# Patient Record
Sex: FEMALE | Race: BLACK OR AFRICAN AMERICAN | NOT HISPANIC OR LATINO | Employment: UNEMPLOYED | URBAN - METROPOLITAN AREA
[De-identification: names, ages, dates, MRNs, and addresses within clinical notes are randomized per-mention and may not be internally consistent; named-entity substitution may affect disease eponyms.]

---

## 2017-02-16 DIAGNOSIS — K21.9 GASTROESOPHAGEAL REFLUX DISEASE, ESOPHAGITIS PRESENCE NOT SPECIFIED: ICD-10-CM

## 2017-02-16 RX ORDER — OMEPRAZOLE 20 MG/1
CAPSULE, DELAYED RELEASE ORAL
Qty: 30 CAPSULE | Refills: 0 | Status: CANCELLED | OUTPATIENT
Start: 2017-02-16

## 2017-02-16 NOTE — TELEPHONE ENCOUNTER
----- Message from Janae Keyes sent at 2/16/2017 11:34 AM CST -----  Contact: Self  REFILL: omeprazole (PRILOSEC) 20 MG capsule

## 2017-02-17 RX ORDER — OMEPRAZOLE 20 MG/1
20 CAPSULE, DELAYED RELEASE ORAL DAILY
Qty: 30 CAPSULE | Refills: 11 | Status: SHIPPED | OUTPATIENT
Start: 2017-02-17 | End: 2018-02-19 | Stop reason: SDUPTHER

## 2017-03-10 ENCOUNTER — OFFICE VISIT (OUTPATIENT)
Dept: FAMILY MEDICINE | Facility: CLINIC | Age: 57
End: 2017-03-10
Payer: COMMERCIAL

## 2017-03-10 VITALS
HEART RATE: 135 BPM | SYSTOLIC BLOOD PRESSURE: 152 MMHG | WEIGHT: 160.25 LBS | HEIGHT: 66 IN | TEMPERATURE: 100 F | DIASTOLIC BLOOD PRESSURE: 78 MMHG | BODY MASS INDEX: 25.75 KG/M2 | OXYGEN SATURATION: 98 %

## 2017-03-10 DIAGNOSIS — B96.89 BACTERIAL SINUSITIS: ICD-10-CM

## 2017-03-10 DIAGNOSIS — I10 ESSENTIAL HYPERTENSION: Chronic | ICD-10-CM

## 2017-03-10 DIAGNOSIS — J06.9 VIRAL URI WITH COUGH: Primary | ICD-10-CM

## 2017-03-10 DIAGNOSIS — J32.9 BACTERIAL SINUSITIS: ICD-10-CM

## 2017-03-10 PROCEDURE — 1160F RVW MEDS BY RX/DR IN RCRD: CPT | Mod: S$GLB,,, | Performed by: NURSE PRACTITIONER

## 2017-03-10 PROCEDURE — 3078F DIAST BP <80 MM HG: CPT | Mod: S$GLB,,, | Performed by: NURSE PRACTITIONER

## 2017-03-10 PROCEDURE — 99999 PR PBB SHADOW E&M-EST. PATIENT-LVL III: CPT | Mod: PBBFAC,,, | Performed by: NURSE PRACTITIONER

## 2017-03-10 PROCEDURE — 99214 OFFICE O/P EST MOD 30 MIN: CPT | Mod: S$GLB,,, | Performed by: NURSE PRACTITIONER

## 2017-03-10 PROCEDURE — 3077F SYST BP >= 140 MM HG: CPT | Mod: S$GLB,,, | Performed by: NURSE PRACTITIONER

## 2017-03-10 RX ORDER — DOXYCYCLINE 100 MG/1
100 CAPSULE ORAL 2 TIMES DAILY
Qty: 20 CAPSULE | Refills: 0 | Status: SHIPPED | OUTPATIENT
Start: 2017-03-10 | End: 2017-11-15

## 2017-03-10 RX ORDER — CODEINE PHOSPHATE AND GUAIFENESIN 10; 100 MG/5ML; MG/5ML
5 SOLUTION ORAL EVERY 4 HOURS PRN
Qty: 120 ML | Refills: 0 | Status: SHIPPED | OUTPATIENT
Start: 2017-03-10 | End: 2017-11-15

## 2017-03-10 RX ORDER — METHYLPREDNISOLONE 4 MG/1
TABLET ORAL
Qty: 1 PACKAGE | Refills: 0 | Status: SHIPPED | OUTPATIENT
Start: 2017-03-10 | End: 2017-10-17

## 2017-03-10 NOTE — PROGRESS NOTES
This dictation has been generated using Dragon Dictation some phonetic errors may occur.     Linh was seen today for sinus problem and cough.    Diagnoses and all orders for this visit:    Viral URI with cough    Bacterial sinusitis  -     doxycycline (MONODOX) 100 MG capsule; Take 1 capsule (100 mg total) by mouth 2 (two) times daily.    Essential hypertension    Other orders  -     methylPREDNISolone (MEDROL DOSEPACK) 4 mg tablet; use as directed  -     guaifenesin-codeine 100-10 mg/5 ml (CHERATUSSIN AC)  mg/5 mL syrup; Take 5 mLs by mouth every 4 (four) hours as needed for Cough. Warning:  Risk of somnolence.      Viral upper respiratory infection with likely superimposing bacterial sinusitis treated with meds as above.      Return in about 2 weeks (around 3/24/2017).      ________________________________________________________________  ________________________________________________________________        Chief Complaint   Patient presents with    Sinus Problem    Cough     History of present illness  This 56 y.o. presents today for complaint of sinus issues.  Symptoms started about 10 days ago with sneezing runny nose and some postnasal drip.  Since that time she's got more sinus pressure and occasional right ear pain.  She had a sinus infection in December which responded slowly to antibiotics but did resolve.  She is currently taking some Zyrtec.  Patient notes sinus pain.   Overall symptoms have dramatically worsened.  Review of systems  No fever or chills  No sore throat.  No drainage from the eyes.  Patient denies gland swelling  She denies chest pain or shortness of breath.  Occasional cough nonproductive.  No nausea vomiting or diarrhea    Past medical and social history reviewed.  Nonsmoker    Past Medical History:   Diagnosis Date    Anemia 4/16/2015    Coronary artery disease     GERD (gastroesophageal reflux disease)     Hyperlipidemia     Hypertension     Microcytic anemia 4/16/2015     Positive PPD 4/16/2015    Vitamin D deficiency disease 4/16/2015       Past Surgical History:   Procedure Laterality Date    BREAST CYST EXCISION      PARTIAL HYSTERECTOMY      over 21 years ago       Family History   Problem Relation Age of Onset    Heart disease Father     Hyperlipidemia Father     Hyperlipidemia Mother     Kidney disease Mother     Cancer Brother      pancreas    Heart disease Brother     Breast cancer Neg Hx     Colon cancer Neg Hx     Ovarian cancer Neg Hx        Social History     Social History    Marital status:      Spouse name: N/A    Number of children: N/A    Years of education: N/A     Occupational History    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 30 tablet 11    aspirin 81 MG Chew Take 81 mg by mouth once daily.        atorvastatin (LIPITOR) 20 MG tablet Take 1 tablet by mouth daily 90 tablet 3    ergocalciferol (ERGOCALCIFEROL) 50,000 unit Cap Take 1 capsule (50,000 Units total) by mouth every 7 days. 12 capsule 12    hydrochlorothiazide (HYDRODIURIL) 25 MG tablet Take 1 tablet (25 mg total) by mouth once daily. 90 tablet 12    omeprazole (PRILOSEC) 20 MG capsule Take 1 capsule (20 mg total) by mouth once daily. 30 capsule 11    doxycycline (MONODOX) 100 MG capsule Take 1 capsule (100 mg total) by mouth 2 (two) times daily. 20 capsule 0    guaifenesin-codeine 100-10 mg/5 ml (CHERATUSSIN AC)  mg/5 mL syrup Take 5 mLs by mouth every 4 (four) hours as needed for Cough. Warning:   Risk of somnolence. 120 mL 0    methylPREDNISolone (MEDROL DOSEPACK) 4 mg tablet use as directed 1 Package 0    metoprolol tartrate (LOPRESSOR) 25 MG tablet Take 1 tablet (25 mg total) by mouth 2 (two) times daily as needed (palpitations). 60 tablet 11    zolpidem (AMBIEN) 10 mg Tab Take 1 tablet (10 mg total) by mouth nightly as needed. 30 tablet 5     No current facility-administered medications for this visit.        Review of patient's allergies indicates:  No Known Allergies    Physical examination  Vitals Reviewed.  Temperature is noted 100.4.  Gen. Well-dressed well-nourished patient appears ill not septic  Skin warm dry and intact.  No rashes noted.  HEENT.  TM intact bilateral with normal light reflex.  No mastoid tenderness during percussion.  Nares patent bilateral.  Pharynx is unremarkable.  Maxillary or frontal sinus tenderness when percussed.    Neck is supple without adenopathy  Chest.  Respirations are even unlabored.  Lungs are clear to auscultation.  Cardiac regular rate and rhythm.  No chest wall adenopathy noted.  Neuro. Awake alert oriented x4.  Normal judgment and cognition noted.  Extremities no clubbing cyanosis or edema noted.     Call or return to clinic prn if these symptoms worsen or fail to improve as anticipated.

## 2017-03-10 NOTE — MR AVS SNAPSHOT
[facility]  [address]  [place] [address]  Phone: [phone]  Fax: [phone]                  Linh Clifton   3/10/2017 7:20 AM   Office Visit    Description:  Female : 1960   Provider:  Roge Lainez NP   Department:  [facility]           Reason for Visit     Sinus Problem     Cough           Diagnoses this Visit        Comments    Viral URI with cough    -  Primary     Bacterial sinusitis         Essential hypertension                To Do List           Goals (5 Years of Data)     None      Follow-Up and Disposition     Return in about 2 weeks (around 3/24/2017).       These Medications        Disp Refills Start End    doxycycline (MONODOX) 100 MG capsule 20 capsule 0 3/10/2017     Take 1 capsule (100 mg total) by mouth 2 (two) times daily. - Oral    Pharmacy: [id number] - [place], [place] - [address] Ph #: [phone]       methylPREDNISolone (MEDROL DOSEPACK) 4 mg tablet 1 Package 0 3/10/2017     use as directed    Pharmacy: [facility] - [place], [place] - [address] Ph #: [phone]       guaifenesin-codeine 100-10 mg/5 ml (CHERATUSSIN AC)  mg/5 mL syrup 120 mL 0 3/10/2017     Take 5 mLs by mouth every 4 (four) hours as needed for Cough. Warning:  Risk of somnolence. - Oral    Pharmacy: [facility] - [place], [place] - [address] Ph #: [phone]         [facility] On Call     [facility] On Call Nurse Care Line -  Assistance  Registered nurses in the [facility] provide clinical advisement, health education, appointment booking, and other advisory services.  Call for this free service at [phone].             Medications           Message regarding Medications     Verify the changes and/or additions to your medication regime listed below are the same as discussed with your clinician today.  If any of these changes or additions are incorrect, please notify your  healthcare provider.        START taking these NEW medications        Refills    doxycycline (MONODOX) 100 MG capsule 0    Sig: Take 1 capsule (100 mg total) by mouth 2 (two) times daily.    Class: Normal    Route: Oral    methylPREDNISolone (MEDROL DOSEPACK) 4 mg tablet 0    Sig: use as directed    Class: Normal    guaifenesin-codeine 100-10 mg/5 ml (CHERATUSSIN AC)  mg/5 mL syrup 0    Sig: Take 5 mLs by mouth every 4 (four) hours as needed for Cough. Warning:  Risk of somnolence.    Class: Normal    Route: Oral      STOP taking these medications     doxycycline (VIBRA-TABS) 100 MG tablet Take 1 tablet (100 mg total) by mouth 2 (two) times daily.    hydrocodone-acetaminophen 5-325mg (NORCO) 5-325 mg per tablet Take 1 tablet by mouth every 6 (six) hours as needed for Pain.    naproxen (NAPROSYN) 500 MG tablet Take 1 tablet (500 mg total) by mouth 2 (two) times daily.           Verify that the below list of medications is an accurate representation of the medications you are currently taking.  If none reported, the list may be blank. If incorrect, please contact your healthcare provider. Carry this list with you in case of emergency.           Current Medications     acyclovir (ZOVIRAX) 400 MG tablet Take 1 tablet (400 mg total) by mouth 3 (three) times daily as needed.    aspirin 81 MG Chew Take 81 mg by mouth once daily.      atorvastatin (LIPITOR) 20 MG tablet Take 1 tablet by mouth daily    ergocalciferol (ERGOCALCIFEROL) 50,000 unit Cap Take 1 capsule (50,000 Units total) by mouth every 7 days.    hydrochlorothiazide (HYDRODIURIL) 25 MG tablet Take 1 tablet (25 mg total) by mouth once daily.    omeprazole (PRILOSEC) 20 MG capsule Take 1 capsule (20 mg total) by mouth once daily.    doxycycline (MONODOX) 100 MG capsule Take 1 capsule (100 mg total) by mouth 2 (two) times daily.    guaifenesin-codeine 100-10 mg/5 ml (CHERATUSSIN AC)  mg/5 mL syrup Take 5 mLs by mouth every 4 (four) hours as needed for  "Cough. Warning:  Risk of somnolence.    methylPREDNISolone (MEDROL DOSEPACK) 4 mg tablet use as directed    metoprolol tartrate (LOPRESSOR) 25 MG tablet Take 1 tablet (25 mg total) by mouth 2 (two) times daily as needed (palpitations).    zolpidem (AMBIEN) 10 mg Tab Take 1 tablet (10 mg total) by mouth nightly as needed.           Clinical Reference Information           Your Vitals Were     BP Pulse Temp Height Weight SpO2    152/78 (BP Location: Right arm, Patient Position: Sitting, BP Method: Manual) 135 100.4 °F (38 °C) 5' 6" (1.676 m) 72.7 kg (160 lb 4.4 oz) 98%    BMI                25.87 kg/m2          Blood Pressure          Most Recent Value    BP  (!)  152/78      Allergies as of 3/10/2017     No Known Allergies      Immunizations Administered on Date of Encounter - 3/10/2017     None      Language Assistance Services     ATTENTION: Language assistance services are available, free of charge. Please call [phone].      ATENCIÓN: Si rahat susan, tiene a draper disposición servicios gratuitos de asistencia lingüística. Llame al [phone].     ALMA Ý: N?u b?n nói Ti?ng Vi?t, có các d?ch v? h? tr? ngôn ng? mi?n phí dennysh cho b?n. G?i s? [phone].         [facility] complies with applicable Federal civil rights laws and does not discriminate on the basis of race, color, national origin, age, disability, or sex.        "

## 2017-06-26 RX ORDER — HYDROCHLOROTHIAZIDE 25 MG/1
25 TABLET ORAL DAILY
Qty: 90 TABLET | Refills: 12 | Status: SHIPPED | OUTPATIENT
Start: 2017-06-26 | End: 2018-07-13 | Stop reason: SDUPTHER

## 2017-06-26 RX ORDER — ERGOCALCIFEROL 1.25 MG/1
50000 CAPSULE ORAL
Qty: 12 CAPSULE | Refills: 12 | Status: SHIPPED | OUTPATIENT
Start: 2017-06-26 | End: 2018-07-05 | Stop reason: SDUPTHER

## 2017-06-26 RX ORDER — ATORVASTATIN CALCIUM 20 MG/1
TABLET, FILM COATED ORAL
Qty: 90 TABLET | Refills: 3 | Status: SHIPPED | OUTPATIENT
Start: 2017-06-26 | End: 2018-07-13 | Stop reason: SDUPTHER

## 2017-08-18 ENCOUNTER — PATIENT MESSAGE (OUTPATIENT)
Dept: RESEARCH | Facility: HOSPITAL | Age: 57
End: 2017-08-18

## 2017-10-04 ENCOUNTER — OFFICE VISIT (OUTPATIENT)
Dept: FAMILY MEDICINE | Facility: CLINIC | Age: 57
End: 2017-10-04
Payer: COMMERCIAL

## 2017-10-04 VITALS
DIASTOLIC BLOOD PRESSURE: 60 MMHG | BODY MASS INDEX: 30.02 KG/M2 | WEIGHT: 163.13 LBS | TEMPERATURE: 98 F | HEIGHT: 62 IN | OXYGEN SATURATION: 97 % | HEART RATE: 99 BPM | SYSTOLIC BLOOD PRESSURE: 128 MMHG

## 2017-10-04 DIAGNOSIS — J30.2 ACUTE SEASONAL ALLERGIC RHINITIS, UNSPECIFIED TRIGGER: ICD-10-CM

## 2017-10-04 DIAGNOSIS — J06.9 URI, ACUTE: ICD-10-CM

## 2017-10-04 DIAGNOSIS — M70.22 OLECRANON BURSITIS OF LEFT ELBOW: Primary | ICD-10-CM

## 2017-10-04 PROCEDURE — 99999 PR PBB SHADOW E&M-EST. PATIENT-LVL III: CPT | Mod: PBBFAC,,, | Performed by: INTERNAL MEDICINE

## 2017-10-04 PROCEDURE — 99214 OFFICE O/P EST MOD 30 MIN: CPT | Mod: S$GLB,,, | Performed by: INTERNAL MEDICINE

## 2017-10-04 NOTE — PROGRESS NOTES
Chief complaint: Elbow pain    56-year-old black female   checking and 16 minutes late for the prior appointment in checking in 45 minutes early for this appointment and then having attitude with the staff checking her in..  His reports that about one week ago she hit her left elbow on a door.  Some immediate pain.  No real pain now but she does have some tenderness over the olecranon as well as some swelling that she was concerned about.  No reduced range of motion.  She has not taken any medication for this.  Also this morning she awoke with a scratchy throat and postnasal drip.  She does have ALLERGIES.  She takes Zyrtec and it makes her sleepy.  We discussed switching to Allegra.  She can add Chlor-Trimeton at 8 in case she gets the drip at night.  She has no peptic ulcer disease or renal insufficiency that would preclude her from using anti-inflammatories such as Naprosyn for the inflammation of the elbow      ROS:   CONST: No fevers or chills, no facial pain, no malaise to suggest a virus, no other myalgias arthralgias, no neurological symptoms or numbness in the hand      Past medical history:  1.  Hypertension  2.  Hyperlipidemia  3.  Heart disease, seen by Dr. Tirado in the past and cardiac angiogram over 10 years ago showed only a 40% blockage- SARAH neg   4.  Normal colonoscopy at age 48, 10 years  5.  Mammograms done outside the system  6.  ALLERGIC rhinitis  7.  Vitamin D deficiency  8.  Positive PPD with normal chest x-rays  9.  Microcytic anemia    Social history:  with 2 children, occasional wine, never smoked, works as a rehabilitation technician    Family history: Mom  of kidney failure, father  of MI in his 70s, one brother  of pancreatic cancer and one brother being treated for lung cancer.  Both brothers were smokers.    Vital signs as above, Gen: no distress  EYES: conjunctiva clear, non-icteric, PERRL  ENT: nose congested, nasal mucosa red, oropharynx slightly red and  "moist, teeth good  NECK:supple, thyroid non-palpable, no cervical lymph nodes  RESP: effort is good, lungs clear  CV: heart RRR w/o murmur, gallops or rubs; , no edema  GI: abdomen soft, non-distended, non-tender  MS: gait normal, no clubbing or cyanosis of the digits, left elbow has full range of motion without pain, forearm nontender.  She does have some mild swelling and slight tenderness of the left olecranon bursa compared to the right.  No breaks in the skin.  SKIN: no rashes, warm to touch, no sinus pain to touch    Linh was seen today for elbow injury.    Diagnoses and all orders for this visit:    Olecranon bursitis of left elbow, secondary to trauma, reassured, she needs to apply an elbow brace for compression and protection and use the Aleve twice a day for a couple of days    Acute seasonal allergic rhinitis, unspecified trigger, symptoms appear acute without other malaise do not suspect a virus but she could well have a URI which we discussed the progression of symptoms.  Either way she will treat the symptoms with antihistamine such as Allegra, Chlor-Trimeton etc.    URI, acute        Clinical note will be sensitive aced upon the need to document the significantly late arrivals"This note will not be shared with the patient."  "

## 2017-10-16 ENCOUNTER — TELEPHONE (OUTPATIENT)
Dept: FAMILY MEDICINE | Facility: CLINIC | Age: 57
End: 2017-10-16

## 2017-10-16 NOTE — TELEPHONE ENCOUNTER
----- Message from Arminda Urena sent at 10/16/2017  3:27 PM CDT -----  Contact: Self   Patient says she was prescribed all over the counter medications but none of them are working for her elbow pain. Please call at [phone]

## 2017-10-17 ENCOUNTER — OFFICE VISIT (OUTPATIENT)
Dept: FAMILY MEDICINE | Facility: CLINIC | Age: 57
End: 2017-10-17
Payer: COMMERCIAL

## 2017-10-17 VITALS
OXYGEN SATURATION: 97 % | SYSTOLIC BLOOD PRESSURE: 140 MMHG | BODY MASS INDEX: 29.9 KG/M2 | WEIGHT: 162.5 LBS | TEMPERATURE: 98 F | HEIGHT: 62 IN | HEART RATE: 100 BPM | DIASTOLIC BLOOD PRESSURE: 80 MMHG

## 2017-10-17 DIAGNOSIS — Z23 NEED FOR TD VACCINE: Primary | ICD-10-CM

## 2017-10-17 DIAGNOSIS — Z23 NEED FOR TDAP VACCINATION: ICD-10-CM

## 2017-10-17 DIAGNOSIS — Z23 NEED FOR INFLUENZA VACCINATION: ICD-10-CM

## 2017-10-17 PROCEDURE — 90714 TD VACC NO PRESV 7 YRS+ IM: CPT | Mod: S$GLB,,, | Performed by: NURSE PRACTITIONER

## 2017-10-17 PROCEDURE — 90471 IMMUNIZATION ADMIN: CPT | Mod: S$GLB,,, | Performed by: NURSE PRACTITIONER

## 2017-10-17 PROCEDURE — 90472 IMMUNIZATION ADMIN EACH ADD: CPT | Mod: S$GLB,,, | Performed by: NURSE PRACTITIONER

## 2017-10-17 PROCEDURE — 99999 PR PBB SHADOW E&M-EST. PATIENT-LVL IV: CPT | Mod: PBBFAC,,, | Performed by: NURSE PRACTITIONER

## 2017-10-17 PROCEDURE — 90686 IIV4 VACC NO PRSV 0.5 ML IM: CPT | Mod: S$GLB,,, | Performed by: NURSE PRACTITIONER

## 2017-10-17 PROCEDURE — 99499 UNLISTED E&M SERVICE: CPT | Mod: S$GLB,,, | Performed by: NURSE PRACTITIONER

## 2017-10-17 RX ORDER — DICLOFENAC SODIUM 75 MG/1
75 TABLET, DELAYED RELEASE ORAL 2 TIMES DAILY
Qty: 60 TABLET | Refills: 0 | Status: SHIPPED | OUTPATIENT
Start: 2017-10-17 | End: 2017-11-15

## 2017-10-17 NOTE — PROGRESS NOTES
Subjective:       Patient ID: Linh Clifton is a 57 y.o. female.    Chief Complaint: No chief complaint on file.    HPI    Past Medical History:   Diagnosis Date    Anemia 4/16/2015    Coronary artery disease     GERD (gastroesophageal reflux disease)     Hyperlipidemia     Hypertension     Microcytic anemia 4/16/2015    Positive PPD 4/16/2015    Vitamin D deficiency disease 4/16/2015       Social History     Social History    Marital status:      Spouse name: N/A    Number of children: N/A    Years of education: N/A     Occupational History    PA asst [employer]     Social History Main Topics    Smoking status: Never Smoker    Smokeless tobacco: Never Used    Alcohol use 0.6 oz/week     1 Glasses of wine per week      Comment: occasional drinking    Drug use: No    Sexual activity: Not on file     Other Topics Concern    Not on file     Social History Narrative    No narrative on file       Past Surgical History:   Procedure Laterality Date    BREAST CYST EXCISION      PARTIAL HYSTERECTOMY      over 21 years ago       Review of Systems       Objective:   There were no vitals taken for this visit.     Physical Exam       Assessment:       No diagnosis found.    Plan:       There are no diagnoses linked to this encounter.

## 2017-11-15 ENCOUNTER — OFFICE VISIT (OUTPATIENT)
Dept: FAMILY MEDICINE | Facility: CLINIC | Age: 57
End: 2017-11-15
Payer: COMMERCIAL

## 2017-11-15 VITALS
SYSTOLIC BLOOD PRESSURE: 146 MMHG | BODY MASS INDEX: 29.86 KG/M2 | TEMPERATURE: 98 F | HEIGHT: 62 IN | WEIGHT: 162.25 LBS | DIASTOLIC BLOOD PRESSURE: 80 MMHG | HEART RATE: 100 BPM | OXYGEN SATURATION: 98 %

## 2017-11-15 DIAGNOSIS — J01.00 ACUTE NON-RECURRENT MAXILLARY SINUSITIS: Primary | ICD-10-CM

## 2017-11-15 PROCEDURE — 99214 OFFICE O/P EST MOD 30 MIN: CPT | Mod: S$GLB,,, | Performed by: NURSE PRACTITIONER

## 2017-11-15 PROCEDURE — 99999 PR PBB SHADOW E&M-EST. PATIENT-LVL IV: CPT | Mod: PBBFAC,,, | Performed by: NURSE PRACTITIONER

## 2017-11-15 RX ORDER — PROMETHAZINE HYDROCHLORIDE AND DEXTROMETHORPHAN HYDROBROMIDE 6.25; 15 MG/5ML; MG/5ML
5 SYRUP ORAL
Qty: 180 ML | Refills: 0 | Status: SHIPPED | OUTPATIENT
Start: 2017-11-15 | End: 2017-11-25

## 2017-11-15 RX ORDER — FLUTICASONE PROPIONATE 50 MCG
2 SPRAY, SUSPENSION (ML) NASAL DAILY
Qty: 16 G | Refills: 0 | Status: SHIPPED | OUTPATIENT
Start: 2017-11-15 | End: 2023-07-25

## 2017-11-15 RX ORDER — LEVOCETIRIZINE DIHYDROCHLORIDE 5 MG/1
5 TABLET, FILM COATED ORAL NIGHTLY
Qty: 30 TABLET | Refills: 0 | Status: SHIPPED | OUTPATIENT
Start: 2017-11-15 | End: 2019-06-11 | Stop reason: ALTCHOICE

## 2017-11-15 NOTE — PROGRESS NOTES
Subjective:       Patient ID: Linh Clifton is a 57 y.o. female.    Chief Complaint: Cough and Nasal Congestion    Sinusitis   This is a new problem. Episode onset: 4 days  The problem has been gradually improving since onset. Her pain is at a severity of 4/10. Associated symptoms include chills, congestion, coughing, diaphoresis, headaches, shortness of breath, sinus pressure and a sore throat. Pertinent negatives include no ear pain or sneezing. Treatments tried: nyquil. The treatment provided moderate relief.       Past Medical History:   Diagnosis Date    Anemia 4/16/2015    Coronary artery disease     GERD (gastroesophageal reflux disease)     Hyperlipidemia     Hypertension     Microcytic anemia 4/16/2015    Positive PPD 4/16/2015    Vitamin D deficiency disease 4/16/2015       Social History     Social History    Marital status:      Spouse name: N/A    Number of children: N/A    Years of education: N/A     Occupational History    PA  [employer]     Social History Main Topics    Smoking status: Never Smoker    Smokeless tobacco: Never Used    Alcohol use 0.6 oz/week     1 Glasses of wine per week      Comment: occasional drinking    Drug use: No    Sexual activity: Not on file     Other Topics Concern    Not on file     Social History Narrative    No narrative on file       Past Surgical History:   Procedure Laterality Date    BREAST CYST EXCISION      PARTIAL HYSTERECTOMY      over 21 years ago       Review of Systems   Constitutional: Positive for chills, diaphoresis and fever.   HENT: Positive for congestion, postnasal drip, rhinorrhea, sinus pressure and sore throat. Negative for ear pain, sneezing and trouble swallowing.    Respiratory: Positive for cough, chest tightness and shortness of breath.    Cardiovascular: Negative for chest pain and palpitations.   Musculoskeletal: Positive for myalgias.   Neurological: Positive for headaches.   All other  "systems reviewed and are negative.      Objective:   BP (!) 146/80 (BP Location: Right arm, Patient Position: Sitting, BP Method: Small (Manual))   Pulse 100   Temp 98.1 °F (36.7 °C) (Oral)   Ht 5' 2" (1.575 m)   Wt 73.6 kg (162 lb 4.1 oz)   SpO2 98%   BMI 29.68 kg/m²      Physical Exam   Constitutional: She is oriented to person, place, and time. She appears well-developed and well-nourished. She is cooperative.   HENT:   Head: Normocephalic and atraumatic.   Right Ear: Hearing, external ear and ear canal normal. A middle ear effusion is present.   Left Ear: Hearing, external ear and ear canal normal. Tympanic membrane is bulging.   Nose: Mucosal edema and rhinorrhea present. Right sinus exhibits maxillary sinus tenderness and frontal sinus tenderness. Left sinus exhibits maxillary sinus tenderness and frontal sinus tenderness.   Mouth/Throat: Posterior oropharyngeal erythema present. No oropharyngeal exudate or posterior oropharyngeal edema.   Cardiovascular: Normal rate, regular rhythm, S1 normal, S2 normal and normal heart sounds.    Pulmonary/Chest: Effort normal and breath sounds normal. No respiratory distress. She has no decreased breath sounds. She has no wheezes. She has no rhonchi. She has no rales.   Lymphadenopathy:     She has no cervical adenopathy.   Neurological: She is alert and oriented to person, place, and time.   Skin: Skin is warm, dry and intact. She is not diaphoretic. No pallor.   Psychiatric: She has a normal mood and affect. Her speech is normal and behavior is normal.   Vitals reviewed.      Assessment:       1. Acute non-recurrent maxillary sinusitis        Plan:       Linh was seen today for cough and nasal congestion.    Diagnoses and all orders for this visit:    Acute non-recurrent maxillary sinusitis  -     levocetirizine (XYZAL) 5 MG tablet; Take 1 tablet (5 mg total) by mouth every evening.  -     fluticasone (FLONASE) 50 mcg/actuation nasal spray; 2 sprays by Each Nare " route once daily.  -     promethazine-dextromethorphan (PROMETHAZINE-DM) 6.25-15 mg/5 mL Syrp; Take 5 mLs by mouth every 4 to 6 hours as needed.  -     Discussed diagnosis and treatment of URI.   -     Suggested symptomatic OTC remedies.   -     Increase your water intake to 64-80 oz daily to help thin mucus  -     Nasal Saline spray (Over the counter [place] spray or [place])  2 sprays each nostril 2-3 times a day for nasal congestion  -     Tylenol 500 mg 2 tablets or Ibuprofen 200 mg 2 tablets every 4-6 hours as needed for fever, headaches, sore throat, ear pain, bodyaches, and/or nasal/sinus inflammation  -     Warm salt water gargles with 1/2 cup water and 1 tablespoon salt every 4 hours  -     Warm tea with honey and lemon      Return if symptoms worsen or fail to improve.

## 2017-11-15 NOTE — PATIENT INSTRUCTIONS

## 2018-02-19 DIAGNOSIS — K21.9 GASTROESOPHAGEAL REFLUX DISEASE, ESOPHAGITIS PRESENCE NOT SPECIFIED: ICD-10-CM

## 2018-02-19 RX ORDER — OMEPRAZOLE 20 MG/1
CAPSULE, DELAYED RELEASE ORAL
Qty: 30 CAPSULE | Refills: 11 | Status: SHIPPED | OUTPATIENT
Start: 2018-02-19 | End: 2019-02-22 | Stop reason: SDUPTHER

## 2018-02-22 ENCOUNTER — TELEPHONE (OUTPATIENT)
Dept: FAMILY MEDICINE | Facility: CLINIC | Age: 58
End: 2018-02-22

## 2018-02-22 NOTE — TELEPHONE ENCOUNTER
Verified with pharmacy that prilosec has been at the pharmacy x4 days. Patient did not call and verify with Pharmacy. Patient informed that medication was at the pharmacy and ready for .  No further questions or concerns.

## 2018-02-22 NOTE — TELEPHONE ENCOUNTER
----- Message from Marlen Augustin sent at 2/22/2018 11:53 AM CST -----  Contact: self  Rep states [facility] does not have script for  ---omeprazole (PRILOSEC) 20 MG capsule--- Pt call back # [phone]

## 2018-05-09 ENCOUNTER — TELEPHONE (OUTPATIENT)
Dept: FAMILY MEDICINE | Facility: CLINIC | Age: 58
End: 2018-05-09

## 2018-05-09 DIAGNOSIS — Z12.12 SCREENING FOR COLORECTAL CANCER: Primary | ICD-10-CM

## 2018-05-09 DIAGNOSIS — Z12.11 SCREENING FOR COLORECTAL CANCER: Primary | ICD-10-CM

## 2018-05-09 NOTE — TELEPHONE ENCOUNTER
----- Message from Feliciano Chandler MD sent at 5/9/2018  3:23 PM CDT -----  I am getting too many repeated messages forwarded to me such as this that appear that patient should have been called to get more information prior to passing  it to me.  I have copied numerous of these messages and have them for you if you would like.  I also get [facility] on call messages stating disposition is to call patient back and they are simply forwarded to me without calling the patient and getting more current information.  This is really piling up and slowing down messages being completed.  Please address this with the nurses, particular Richelle.  She is instructing the new nurse who seems to be following her lead on this

## 2018-05-09 NOTE — TELEPHONE ENCOUNTER
----- Message from Carie Torres sent at 5/8/2018  3:25 PM CDT -----  Contact: self   [phone]  Pt is requesting to speak to  you regarding a colonoscopy  Appt.Pls call pt [phone]. Thanks......Verna

## 2018-05-09 NOTE — TELEPHONE ENCOUNTER
Please always call patient to get more information prior to forwarding messages.  I cannot really do anything with this message

## 2018-05-13 NOTE — TELEPHONE ENCOUNTER
Advise order in and endoscopy staff at [facility] should be calling at some point

## 2018-06-14 DIAGNOSIS — Z11.59 NEED FOR HEPATITIS C SCREENING TEST: ICD-10-CM

## 2018-06-15 ENCOUNTER — HOSPITAL ENCOUNTER (OUTPATIENT)
Facility: HOSPITAL | Age: 58
Discharge: HOME | End: 2018-06-15
Attending: INTERNAL MEDICINE | Admitting: INTERNAL MEDICINE
Payer: COMMERCIAL

## 2018-06-15 ENCOUNTER — ANESTHESIA EVENT (OUTPATIENT)
Dept: ENDOSCOPY | Facility: HOSPITAL | Age: 58
End: 2018-06-15
Payer: COMMERCIAL

## 2018-06-15 ENCOUNTER — ANESTHESIA (OUTPATIENT)
Dept: ENDOSCOPY | Facility: HOSPITAL | Age: 58
End: 2018-06-15
Payer: COMMERCIAL

## 2018-06-15 VITALS
OXYGEN SATURATION: 100 % | SYSTOLIC BLOOD PRESSURE: 146 MMHG | DIASTOLIC BLOOD PRESSURE: 71 MMHG | WEIGHT: 162 LBS | TEMPERATURE: 98 F | RESPIRATION RATE: 18 BRPM | HEIGHT: 66 IN | BODY MASS INDEX: 26.03 KG/M2 | HEART RATE: 62 BPM

## 2018-06-15 DIAGNOSIS — Z12.11 ENCOUNTER FOR SCREENING COLONOSCOPY: ICD-10-CM

## 2018-06-15 PROCEDURE — 88305 TISSUE EXAM BY PATHOLOGIST: CPT | Performed by: PATHOLOGY

## 2018-06-15 PROCEDURE — 37000008 HC ANESTHESIA 1ST 15 MINUTES: Performed by: INTERNAL MEDICINE

## 2018-06-15 PROCEDURE — 25000003 PHARM REV CODE 250: Performed by: INTERNAL MEDICINE

## 2018-06-15 PROCEDURE — D9220A PRA ANESTHESIA: Mod: 33,ANES,, | Performed by: ANESTHESIOLOGY

## 2018-06-15 PROCEDURE — 88305 TISSUE EXAM BY PATHOLOGIST: CPT | Mod: 26,,, | Performed by: PATHOLOGY

## 2018-06-15 PROCEDURE — 37000009 HC ANESTHESIA EA ADD 15 MINS: Performed by: INTERNAL MEDICINE

## 2018-06-15 PROCEDURE — 63600175 PHARM REV CODE 636 W HCPCS: Performed by: NURSE ANESTHETIST, CERTIFIED REGISTERED

## 2018-06-15 PROCEDURE — D9220A PRA ANESTHESIA: Mod: 33,CRNA,, | Performed by: NURSE ANESTHETIST, CERTIFIED REGISTERED

## 2018-06-15 PROCEDURE — 27201089 HC SNARE, DISP (ANY): Performed by: INTERNAL MEDICINE

## 2018-06-15 PROCEDURE — 45385 COLONOSCOPY W/LESION REMOVAL: CPT | Performed by: INTERNAL MEDICINE

## 2018-06-15 RX ORDER — SODIUM CHLORIDE 9 MG/ML
INJECTION, SOLUTION INTRAVENOUS CONTINUOUS
Status: DISCONTINUED | OUTPATIENT
Start: 2018-06-15 | End: 2018-06-15 | Stop reason: HOSPADM

## 2018-06-15 RX ORDER — PROPOFOL 10 MG/ML
VIAL (ML) INTRAVENOUS
Status: DISCONTINUED | OUTPATIENT
Start: 2018-06-15 | End: 2018-06-15

## 2018-06-15 RX ORDER — LIDOCAINE HCL/PF 100 MG/5ML
SYRINGE (ML) INTRAVENOUS
Status: DISCONTINUED | OUTPATIENT
Start: 2018-06-15 | End: 2018-06-15

## 2018-06-15 RX ADMIN — PROPOFOL 50 MG: 10 INJECTION, EMULSION INTRAVENOUS at 12:06

## 2018-06-15 RX ADMIN — SODIUM CHLORIDE: 0.9 INJECTION, SOLUTION INTRAVENOUS at 10:06

## 2018-06-15 RX ADMIN — PROPOFOL 70 MG: 10 INJECTION, EMULSION INTRAVENOUS at 12:06

## 2018-06-15 RX ADMIN — LIDOCAINE HYDROCHLORIDE 60 MG: 20 INJECTION, SOLUTION INTRAVENOUS at 12:06

## 2018-06-15 RX ADMIN — PROPOFOL 30 MG: 10 INJECTION, EMULSION INTRAVENOUS at 12:06

## 2018-06-15 NOTE — ANESTHESIA PREPROCEDURE EVALUATION
06/15/2018  Linh Clifton is a 58 y.o., female.    Anesthesia Evaluation    I have reviewed the Patient Summary Reports.     I have reviewed the Medications.     Review of Systems  Anesthesia Hx:  No problems with previous Anesthesia    Social:  Alcohol Use, Non-Smoker    EENT/Dental:   chronic allergic rhinitis   Cardiovascular:   Hypertension CAD   hyperlipidemia    Hepatic/GI:   GERD        Physical Exam  General:  Well nourished    Airway/Jaw/Neck:  Airway Findings: Mouth Opening: Normal Tongue: Normal  General Airway Assessment: Adult  Mallampati: II  TM Distance: Normal, at least 6 cm  Jaw/Neck Findings:  Neck ROM: Normal ROM      Dental:  Dental Findings: In tact   Chest/Lungs:  Chest/Lungs Findings: Clear to auscultation, Normal Respiratory Rate     Heart/Vascular:  Heart Findings: Rate: Normal        Mental Status:  Mental Status Findings:  Cooperative, Alert and Oriented         Anesthesia Plan  Type of Anesthesia, risks & benefits discussed:  Anesthesia Type:  general  Patient's Preference:   Intra-op Monitoring Plan: standard ASA monitors  Intra-op Monitoring Plan Comments:   Post Op Pain Control Plan: multimodal analgesia, IV/PO Opioids PRN and per primary service following discharge from PACU  Post Op Pain Control Plan Comments:   Induction:   IV  Beta Blocker:  Patient is not currently on a Beta-Blocker (No further documentation required).       Informed Consent: Patient understands risks and agrees with Anesthesia plan.  Questions answered. Anesthesia consent signed with patient.  ASA Score: 3     Day of Surgery Review of History & Physical:    H&P update referred to the surgeon.         Ready For Surgery From Anesthesia Perspective.

## 2018-06-15 NOTE — H&P
"Gastroenterology    CC: screening    HPI 58 y.o. female here for screening      Past Medical History:   Diagnosis Date    Anemia 4/16/2015    Coronary artery disease     GERD (gastroesophageal reflux disease)     Hyperlipidemia     Hypertension     Microcytic anemia 4/16/2015    Positive PPD 4/16/2015    Vitamin D deficiency disease 4/16/2015         Review of Systems  General ROS: negative for chills, fever or weight loss  Cardiovascular ROS: no chest pain or dyspnea on exertion    Physical Examination  BP (!) 144/71 (BP Location: Left arm, Patient Position: Sitting)   Pulse 95   Temp 98.3 °F (36.8 °C) (Oral)   Resp 17   Ht 5' 6" (1.676 m)   Wt 73.5 kg (162 lb)   SpO2 98%   Breastfeeding? No   BMI 26.15 kg/m²   General appearance: alert, cooperative, no distress  HENT: Normocephalic, atraumatic, neck symmetrical, no nasal discharge   Eyes: conjunctivae/corneas clear, no icterus, EOM's intact  Lungs: resp non-labored  Heart: regular rate   Abdomen: soft, non-tender; bowel sounds normoactive; no organomegaly  Extremities: extremities symmetric; no clubbing, cyanosis, or edema  Neurologic: Alert and oriented X 3, normal strength, normal coordination and gait    Assessment:     screening    Plan:   colonoscopy      "

## 2018-06-15 NOTE — [employer] PATIENT INSTRUCTIONS
Discharge Summary/Instructions after an Endoscopic Procedure  Patient Name: Linh Clifton  Patient MRN: [id number]  Patient YOB: 1960  Friday, Marichuy 15, 2018  Mikael Wilde MD  RESTRICTIONS:  During your procedure today, you received medications for sedation.  These   medications may affect your judgment, balance and coordination.  Therefore,   for 24 hours, you have the following restrictions:   - DO NOT drive a car, operate machinery, make legal/financial decisions,   sign important papers or drink alcohol.    ACTIVITY:  Today: no heavy lifting, straining or running due to procedural   sedation/anesthesia.  The following day: return to full activity including work.  DIET:  Eat and drink normally unless instructed otherwise.     TREATMENT FOR COMMON SIDE EFFECTS:  - Mild abdominal pain, nausea, belching, bloating or excessive gas:  rest,   eat lightly and use a heating pad.  - Sore Throat: treat with throat lozenges and/or gargle with warm salt   water.  - Because air was used during the procedure, expelling large amounts of air   from your rectum or belching is normal.  - If a bowel prep was taken, you may not have a bowel movement for 1-3 days.    This is normal.  SYMPTOMS TO WATCH FOR AND REPORT TO YOUR PHYSICIAN:  1. Abdominal pain or bloating, other than gas cramps.  2. Chest pain.  3. Back pain.  4. Signs of infection such as: chills or fever occurring within 24 hours   after the procedure.  5. Rectal bleeding, which would show as bright red, maroon, or black stools.   (A tablespoon of blood from the rectum is not serious, especially if   hemorrhoids are present.)  6. Vomiting.  7. Weakness or dizziness.  GO DIRECTLY TO THE NEAREST EMERGENCY ROOM IF YOU HAVE ANY OF THE FOLLOWING:      Difficulty breathing              Chills and/or fever over 101 F   Persistent vomiting and/or vomiting blood   Severe abdominal pain   Severe chest pain   Black, tarry stools   Bleeding- more than one  tablespoon   Any other symptom or condition that you feel may need urgent attention  Your doctor recommends these additional instructions:  If any biopsies were taken, your doctors clinic will contact you in 1 to 2   weeks with any results.  - Discharge patient to home.   - Return to normal activities tomorrow.   - Resume previous diet today.   - Await pathology results.   - Repeat colonoscopy in 5-10 years for surveillance pending pathology.  Will   notify the patient.    - Return to primary care physician in 4 weeks.   - The findings and recommendations were discussed with the patient and their   family.  For questions, problems or results please call your physician - Mikael Wilde MD at Work:  [phone].  [facility] can be reached at [phone]     IF A COMPLICATION OR EMERGENCY SITUATION ARISES AND YOU ARE UNABLE TO REACH   YOUR PHYSICIAN - GO DIRECTLY TO THE EMERGENCY ROOM.  MD Mikael Sorto MD  6/15/2018 12:38:51 PM  This report has been verified and signed electronically.  [employer]

## 2018-06-15 NOTE — TRANSFER OF CARE
"Anesthesia Transfer of Care Note    Patient: Linh Clifton    Procedure(s) Performed: Procedure(s) (LRB):  COLONOSCOPY (N/A)    Patient location: GI    Anesthesia Type: general    Transport from OR: Transported from OR on room air with adequate spontaneous ventilation    Post pain: adequate analgesia    Post assessment: no apparent anesthetic complications    Post vital signs: stable    Level of consciousness: awake, alert and oriented    Nausea/Vomiting: no nausea/vomiting    Complications: none    Transfer of care protocol was followed      Last vitals:   Visit Vitals  /66 (BP Location: Left arm, Patient Position: Lying)   Pulse 87   Temp 36.5 °C (97.7 °F) (Oral)   Resp 15   Ht 5' 6" (1.676 m)   Wt 73.5 kg (162 lb)   SpO2 100%   Breastfeeding? No   BMI 26.15 kg/m²     "

## 2018-06-16 NOTE — ANESTHESIA POSTPROCEDURE EVALUATION
"Anesthesia Post Evaluation    Patient: Linh Clifton    Procedure(s) Performed: Procedure(s) (LRB):  COLONOSCOPY (N/A)    Final Anesthesia Type: general  Patient location during evaluation: GI PACU  Patient participation: Yes- Able to Participate  Level of consciousness: awake and alert, awake and oriented  Post-procedure vital signs: reviewed and stable  Pain management: adequate  Airway patency: patent  PONV status at discharge: No PONV  Anesthetic complications: no      Cardiovascular status: blood pressure returned to baseline and hemodynamically stable  Respiratory status: unassisted, spontaneous ventilation and room air  Hydration status: euvolemic  Follow-up not needed.        Visit Vitals  BP (!) 146/71 (BP Location: Left arm, Patient Position: Lying)   Pulse 62   Temp 36.8 °C (98.2 °F)   Resp 18   Ht 5' 6" (1.676 m)   Wt 73.5 kg (162 lb)   SpO2 100%   Breastfeeding? No   BMI 26.15 kg/m²       Pain/Jackson Score: Pain Assessment Performed: Yes (6/15/2018  1:09 PM)  Presence of Pain: denies (6/15/2018  1:09 PM)  Jackson Score: 10 (6/15/2018  1:09 PM)      "

## 2018-07-05 RX ORDER — ERGOCALCIFEROL 1.25 MG/1
CAPSULE ORAL
Qty: 12 CAPSULE | Refills: 12 | Status: SHIPPED | OUTPATIENT
Start: 2018-07-05 | End: 2019-07-12 | Stop reason: SDUPTHER

## 2018-07-13 ENCOUNTER — OFFICE VISIT (OUTPATIENT)
Dept: FAMILY MEDICINE | Facility: CLINIC | Age: 58
End: 2018-07-13
Payer: COMMERCIAL

## 2018-07-13 VITALS
TEMPERATURE: 98 F | HEIGHT: 66 IN | WEIGHT: 162.69 LBS | HEART RATE: 93 BPM | DIASTOLIC BLOOD PRESSURE: 64 MMHG | SYSTOLIC BLOOD PRESSURE: 142 MMHG | OXYGEN SATURATION: 99 % | BODY MASS INDEX: 26.14 KG/M2

## 2018-07-13 DIAGNOSIS — D50.9 MICROCYTIC ANEMIA: ICD-10-CM

## 2018-07-13 DIAGNOSIS — Z86.010 HISTORY OF COLONIC POLYPS: Primary | ICD-10-CM

## 2018-07-13 DIAGNOSIS — E55.9 VITAMIN D DEFICIENCY DISEASE: ICD-10-CM

## 2018-07-13 DIAGNOSIS — E78.5 HYPERLIPIDEMIA, UNSPECIFIED HYPERLIPIDEMIA TYPE: ICD-10-CM

## 2018-07-13 DIAGNOSIS — I10 ESSENTIAL HYPERTENSION: ICD-10-CM

## 2018-07-13 PROCEDURE — 99999 PR PBB SHADOW E&M-EST. PATIENT-LVL III: CPT | Mod: PBBFAC,,, | Performed by: INTERNAL MEDICINE

## 2018-07-13 PROCEDURE — 3008F BODY MASS INDEX DOCD: CPT | Mod: CPTII,S$GLB,, | Performed by: INTERNAL MEDICINE

## 2018-07-13 PROCEDURE — 3077F SYST BP >= 140 MM HG: CPT | Mod: CPTII,S$GLB,, | Performed by: INTERNAL MEDICINE

## 2018-07-13 PROCEDURE — 3078F DIAST BP <80 MM HG: CPT | Mod: CPTII,S$GLB,, | Performed by: INTERNAL MEDICINE

## 2018-07-13 PROCEDURE — 99214 OFFICE O/P EST MOD 30 MIN: CPT | Mod: S$GLB,,, | Performed by: INTERNAL MEDICINE

## 2018-07-13 RX ORDER — METOPROLOL SUCCINATE 25 MG/1
25 TABLET, EXTENDED RELEASE ORAL DAILY
Qty: 30 TABLET | Refills: 11 | Status: SHIPPED | OUTPATIENT
Start: 2018-07-13 | End: 2019-07-09 | Stop reason: SDUPTHER

## 2018-07-13 RX ORDER — HYDROCHLOROTHIAZIDE 25 MG/1
25 TABLET ORAL DAILY
Qty: 90 TABLET | Refills: 12 | Status: SHIPPED | OUTPATIENT
Start: 2018-07-13 | End: 2019-07-15 | Stop reason: SDUPTHER

## 2018-07-13 RX ORDER — ATORVASTATIN CALCIUM 20 MG/1
TABLET, FILM COATED ORAL
Qty: 90 TABLET | Refills: 3 | Status: SHIPPED | OUTPATIENT
Start: 2018-07-13 | End: 2019-07-12 | Stop reason: SDUPTHER

## 2018-07-13 NOTE — PROGRESS NOTES
Chief complaint: follow-up on colonoscopyand other issues    58-year-old black female        Patient recently had her colonoscopy.  I did review the report and the pathology report.  There was 1 polyp but it was a hyperplastic polyp and so she was reassured and discussed that a 10 year follow-up be appropriate.     Her last blood work appears to be .  She had some treated vitamin D deficiency at that time.  She also had anemia.ormal iron studies and very microcytic so likely has a hereditary anemia and we can perform a hemoglobin electrophoresis.e discussed reassessing all these labs.    Blood pressure today 142 and at home she says it runs in the 140s.  We explained getting a pressures below 130.  She is only on the HCTZ.  She was not taking the metoprolol in review of prior cardiac notes it looks like it was initially givento her to take on an as-needed basis for palpitations.  Given her blood pressure elevation andapparent tendency towards tachycardia by review of prior readings, I'll start her on Toprol-XL 25 mg a day in addition to the HCTZ and she will monitor and we will adjust accordingly after lengthy discussion regarding all the issues related to hypertension and it's increased risk for stroke, goals of therapy and so forth.Total time over 25 minutes with over 50% counseling.      ROS:   CONST: No fevers or chills      Past medical history:  1.  Hypertension  2.  Hyperlipidemia  3.  Heart disease, seen by Dr. Tirado in the past and cardiac angiogram over 10 years ago showed only a 40% blockage- SARAH neg   4.  Normal colonoscopy at age 48, 10 years  5.  Mammograms done outside the system  6.  ALLERGIC rhinitis  7.  Vitamin D deficiency  8.  Positive PPD with normal chest x-rays  9.  Microcytic anemia  10.  Hyperplastic polyp on colonoscopy , 10 years    Social history:  with 2 children, occasional wine, never smoked, works as a rehabilitation technician    Family history: Mom  of  "kidney failure, father  of MI in his 70s, one brother  of pancreatic cancer and one brother being treated for lung cancer.  Both brothers were smokers.    Vital signs as above, Gen: no distress    Linh was seen today for results and medication refill.    Diagnoses and all orders for this visit:    History of colonic polyps , reviewed procedure and pathology reports with patient    Essential hypertension uncontrolled, add Toprol 25 to HCTZ  -     Comprehensive metabolic panel; Future    Hyperlipidemia, unspecified hyperlipidemia type reassess on statin    Microcytic anemia, workup for hereditary causes, no known family history of sickle cell disease or other thalassemia  -     Iron and TIBC; Future  -     Ferritin; Future  -     Comprehensive metabolic panel; Future  -     CBC auto differential; Future  -     TSH; Future  -     Vitamin D; Future  -     Lipid panel; Future  -     Hemoglobin Electrophoresis [place], Blood; Future    Vitamin D deficiency disease reassess  -     Vitamin D; Future    Other orders  -     atorvastatin (LIPITOR) 20 MG tablet; Take 1 tablet by mouth daily  -     hydroCHLOROthiazide (HYDRODIURIL) 25 MG tablet; Take 1 tablet (25 mg total) by mouth once daily.  -     metoprolol succinate (TOPROL-XL) 25 MG 24 hr tablet; Take 1 tablet (25 mg total) by mouth once daily.          Clinical note will be sensitive aced upon the need to document the significantly late arrivals"///"This note will not be shared with the patient."  "

## 2018-07-16 ENCOUNTER — LAB VISIT (OUTPATIENT)
Dept: LAB | Facility: HOSPITAL | Age: 58
End: 2018-07-16
Attending: INTERNAL MEDICINE
Payer: COMMERCIAL

## 2018-07-16 DIAGNOSIS — I10 ESSENTIAL HYPERTENSION: ICD-10-CM

## 2018-07-16 DIAGNOSIS — E55.9 VITAMIN D DEFICIENCY DISEASE: ICD-10-CM

## 2018-07-16 DIAGNOSIS — D50.9 MICROCYTIC ANEMIA: ICD-10-CM

## 2018-07-16 LAB
25(OH)D3+25(OH)D2 SERPL-MCNC: 31 NG/ML
ALBUMIN SERPL BCP-MCNC: 3.4 G/DL
ALP SERPL-CCNC: 131 U/L
ALT SERPL W/O P-5'-P-CCNC: 19 U/L
ANION GAP SERPL CALC-SCNC: 8 MMOL/L
AST SERPL-CCNC: 21 U/L
BASOPHILS # BLD AUTO: 0.03 K/UL
BASOPHILS NFR BLD: 0.6 %
BILIRUB SERPL-MCNC: 0.3 MG/DL
BUN SERPL-MCNC: 17 MG/DL
CALCIUM SERPL-MCNC: 9.5 MG/DL
CHLORIDE SERPL-SCNC: 105 MMOL/L
CHOLEST SERPL-MCNC: 158 MG/DL
CHOLEST/HDLC SERPL: 2.6 {RATIO}
CO2 SERPL-SCNC: 30 MMOL/L
CREAT SERPL-MCNC: 0.8 MG/DL
DIFFERENTIAL METHOD: ABNORMAL
EOSINOPHIL # BLD AUTO: 0.3 K/UL
EOSINOPHIL NFR BLD: 5.8 %
ERYTHROCYTE [DISTWIDTH] IN BLOOD BY AUTOMATED COUNT: 17.7 %
EST. GFR  (AFRICAN AMERICAN): >60 ML/MIN/1.73 M^2
EST. GFR  (NON AFRICAN AMERICAN): >60 ML/MIN/1.73 M^2
FERRITIN SERPL-MCNC: 14 NG/ML
GLUCOSE SERPL-MCNC: 104 MG/DL
HCT VFR BLD AUTO: 34.3 %
HDLC SERPL-MCNC: 61 MG/DL
HDLC SERPL: 38.6 %
HGB BLD-MCNC: 10.2 G/DL
IMM GRANULOCYTES # BLD AUTO: 0.01 K/UL
IMM GRANULOCYTES NFR BLD AUTO: 0.2 %
IRON SERPL-MCNC: 48 UG/DL
LDLC SERPL CALC-MCNC: 86.2 MG/DL
LYMPHOCYTES # BLD AUTO: 1.9 K/UL
LYMPHOCYTES NFR BLD: 40.4 %
MCH RBC QN AUTO: 19.8 PG
MCHC RBC AUTO-ENTMCNC: 29.7 G/DL
MCV RBC AUTO: 67 FL
MONOCYTES # BLD AUTO: 0.4 K/UL
MONOCYTES NFR BLD: 9.3 %
NEUTROPHILS # BLD AUTO: 2 K/UL
NEUTROPHILS NFR BLD: 43.7 %
NONHDLC SERPL-MCNC: 97 MG/DL
NRBC BLD-RTO: 0 /100 WBC
PLATELET # BLD AUTO: 198 K/UL
PMV BLD AUTO: ABNORMAL FL
POTASSIUM SERPL-SCNC: 4.3 MMOL/L
PROT SERPL-MCNC: 6.9 G/DL
RBC # BLD AUTO: 5.16 M/UL
SATURATED IRON: 11 %
SODIUM SERPL-SCNC: 143 MMOL/L
TOTAL IRON BINDING CAPACITY: 440 UG/DL
TRANSFERRIN SERPL-MCNC: 297 MG/DL
TRIGL SERPL-MCNC: 54 MG/DL
TSH SERPL DL<=0.005 MIU/L-ACNC: 1.14 UIU/ML
WBC # BLD AUTO: 4.63 K/UL

## 2018-07-16 PROCEDURE — 82728 ASSAY OF FERRITIN: CPT

## 2018-07-16 PROCEDURE — 84443 ASSAY THYROID STIM HORMONE: CPT

## 2018-07-16 PROCEDURE — 36415 COLL VENOUS BLD VENIPUNCTURE: CPT | Mod: PO

## 2018-07-16 PROCEDURE — 83021 HEMOGLOBIN CHROMOTOGRAPHY: CPT

## 2018-07-16 PROCEDURE — 85025 COMPLETE CBC W/AUTO DIFF WBC: CPT

## 2018-07-16 PROCEDURE — 80061 LIPID PANEL: CPT

## 2018-07-16 PROCEDURE — 83540 ASSAY OF IRON: CPT

## 2018-07-16 PROCEDURE — 82306 VITAMIN D 25 HYDROXY: CPT

## 2018-07-16 PROCEDURE — 80053 COMPREHEN METABOLIC PANEL: CPT

## 2018-07-18 LAB
HGB A MFR BLD ELPH: 94.3 % (ref 95.8–98)
HGB A2 MFR BLD: 5.7 % (ref 2–3.3)
HGB F MFR BLD: 0 % (ref 0–0.9)
HGB FRACT BLD ELPH-IMP: ABNORMAL
HGB OTHER MFR BLD ELPH: 0 %
THEVP VARIANT 2: ABNORMAL
THEVP VARIANT 3: ABNORMAL

## 2018-10-09 ENCOUNTER — OFFICE VISIT (OUTPATIENT)
Dept: FAMILY MEDICINE | Facility: CLINIC | Age: 58
End: 2018-10-09
Payer: COMMERCIAL

## 2018-10-09 VITALS
WEIGHT: 163.38 LBS | BODY MASS INDEX: 26.26 KG/M2 | TEMPERATURE: 98 F | OXYGEN SATURATION: 98 % | HEART RATE: 100 BPM | DIASTOLIC BLOOD PRESSURE: 68 MMHG | HEIGHT: 66 IN | SYSTOLIC BLOOD PRESSURE: 142 MMHG

## 2018-10-09 DIAGNOSIS — Z23 NEED FOR INFLUENZA VACCINATION: ICD-10-CM

## 2018-10-09 DIAGNOSIS — M54.50 ACUTE BILATERAL LOW BACK PAIN WITHOUT SCIATICA: Primary | ICD-10-CM

## 2018-10-09 DIAGNOSIS — Z23 NEEDS FLU SHOT: ICD-10-CM

## 2018-10-09 PROCEDURE — 3077F SYST BP >= 140 MM HG: CPT | Mod: CPTII,S$GLB,, | Performed by: NURSE PRACTITIONER

## 2018-10-09 PROCEDURE — 99213 OFFICE O/P EST LOW 20 MIN: CPT | Mod: 25,S$GLB,, | Performed by: NURSE PRACTITIONER

## 2018-10-09 PROCEDURE — 90686 IIV4 VACC NO PRSV 0.5 ML IM: CPT | Mod: S$GLB,,, | Performed by: NURSE PRACTITIONER

## 2018-10-09 PROCEDURE — 3078F DIAST BP <80 MM HG: CPT | Mod: CPTII,S$GLB,, | Performed by: NURSE PRACTITIONER

## 2018-10-09 PROCEDURE — 99999 PR PBB SHADOW E&M-EST. PATIENT-LVL IV: CPT | Mod: PBBFAC,,, | Performed by: NURSE PRACTITIONER

## 2018-10-09 PROCEDURE — 90471 IMMUNIZATION ADMIN: CPT | Mod: S$GLB,,, | Performed by: NURSE PRACTITIONER

## 2018-10-09 PROCEDURE — 3008F BODY MASS INDEX DOCD: CPT | Mod: CPTII,S$GLB,, | Performed by: NURSE PRACTITIONER

## 2018-10-09 RX ORDER — CYCLOBENZAPRINE HCL 5 MG
5 TABLET ORAL 3 TIMES DAILY PRN
Qty: 30 TABLET | Refills: 0 | Status: SHIPPED | OUTPATIENT
Start: 2018-10-09 | End: 2018-10-19

## 2018-10-09 NOTE — PROGRESS NOTES
Patient tolerated injection with no adverse reactions. Informed pt to wait in lobby for 15 minutes.
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
## 2018-10-11 ENCOUNTER — TELEPHONE (OUTPATIENT)
Dept: FAMILY MEDICINE | Facility: CLINIC | Age: 58
End: 2018-10-11

## 2018-10-11 ENCOUNTER — OFFICE VISIT (OUTPATIENT)
Dept: FAMILY MEDICINE | Facility: CLINIC | Age: 58
End: 2018-10-11
Payer: COMMERCIAL

## 2018-10-11 VITALS
WEIGHT: 162.94 LBS | TEMPERATURE: 99 F | HEART RATE: 86 BPM | SYSTOLIC BLOOD PRESSURE: 122 MMHG | BODY MASS INDEX: 25.57 KG/M2 | OXYGEN SATURATION: 99 % | DIASTOLIC BLOOD PRESSURE: 60 MMHG | HEIGHT: 67 IN

## 2018-10-11 DIAGNOSIS — M54.30 SCIATICA, UNSPECIFIED LATERALITY: Primary | ICD-10-CM

## 2018-10-11 PROCEDURE — 99999 PR PBB SHADOW E&M-EST. PATIENT-LVL III: CPT | Mod: PBBFAC,,, | Performed by: NURSE PRACTITIONER

## 2018-10-11 PROCEDURE — 3008F BODY MASS INDEX DOCD: CPT | Mod: CPTII,S$GLB,, | Performed by: NURSE PRACTITIONER

## 2018-10-11 PROCEDURE — 3078F DIAST BP <80 MM HG: CPT | Mod: CPTII,S$GLB,, | Performed by: NURSE PRACTITIONER

## 2018-10-11 PROCEDURE — 3074F SYST BP LT 130 MM HG: CPT | Mod: CPTII,S$GLB,, | Performed by: NURSE PRACTITIONER

## 2018-10-11 PROCEDURE — 99213 OFFICE O/P EST LOW 20 MIN: CPT | Mod: S$GLB,,, | Performed by: NURSE PRACTITIONER

## 2018-10-11 RX ORDER — TRAMADOL HYDROCHLORIDE 50 MG/1
50 TABLET ORAL EVERY 6 HOURS PRN
Qty: 45 TABLET | Refills: 0 | Status: SHIPPED | OUTPATIENT
Start: 2018-10-11 | End: 2018-10-21

## 2018-10-11 RX ORDER — ETODOLAC 400 MG/1
400 TABLET, FILM COATED ORAL 2 TIMES DAILY
Qty: 60 TABLET | Refills: 0 | Status: SHIPPED | OUTPATIENT
Start: 2018-10-11 | End: 2019-06-11

## 2018-10-11 NOTE — TELEPHONE ENCOUNTER
----- Message from Sly Saini sent at 10/10/2018  2:04 PM CDT -----  Contact: [phone]  The patient stating the medication cyclobenzaprine (FLEXERIL) 5 MG tablet not working. The patient is stating she's in a lot of pain and would like to speak to the staff in regards to this matter.      Thank you

## 2018-10-11 NOTE — TELEPHONE ENCOUNTER
----- Message from Marni Muñiz sent at 10/11/2018 10:46 AM CDT -----  Contact: Pharmacy [phone]  Patient is requesting a call back. They would like some clarification on the patient's medication. Please call at your earliest convenience.
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
## 2018-10-11 NOTE — LETTER
October 11, 2018      [facility]  [address] [facility]  [place] [address]  Phone: [phone]  Fax: [phone]       Patient: Linh Clifton   YOB: 1960  Date of Visit: 10/11/2018    To Whom It May Concern:    Richie Clifton  was at [facility] on 10/11/2018. She may return to work/school on 10/12/18 with no restrictions. If you have any questions or concerns, or if I can be of further assistance, please do not hesitate to contact me.    Sincerely,    Nerissa Padilla LPN

## 2018-10-11 NOTE — TELEPHONE ENCOUNTER
----- Message from Mary Kay Estrada sent at 10/11/2018 11:45 AM CDT -----  Contact: Self  Pt is calling to get a return to work note.Please call pt and let her know when to . [phone].

## 2018-10-11 NOTE — TELEPHONE ENCOUNTER
Spoke with the pt, she was seen on 10/9/18, for back pain.  Patient states the Flexeril is not working.  She hasn't been to work all week.  She can't get up outer bed or use the restroom, with out help.  Patient states she can't bend down.  Patient states her pain level while moving is a 10.  If she remains at rest, she is good.  Patient verbalized understandings.

## 2018-10-11 NOTE — PATIENT INSTRUCTIONS
Pain med and muscle relaxer. Risk of somnolence discussed with patient. Do not drive or operate machinery while taking medication. Do not engage in task that require mental alertness.

## 2019-01-15 ENCOUNTER — TELEPHONE (OUTPATIENT)
Dept: FAMILY MEDICINE | Facility: CLINIC | Age: 59
End: 2019-01-15

## 2019-01-15 NOTE — TELEPHONE ENCOUNTER
----- Message from Carie Torres sent at 1/15/2019 11:44 AM CST -----  Contact: [phone]  Pt is requesting a clearance for surgery appt, She will be having eye surgery. Pls call pt [phone]. Thanks........Verna

## 2019-01-21 ENCOUNTER — OFFICE VISIT (OUTPATIENT)
Dept: FAMILY MEDICINE | Facility: CLINIC | Age: 59
End: 2019-01-21
Payer: COMMERCIAL

## 2019-01-21 VITALS
OXYGEN SATURATION: 98 % | TEMPERATURE: 99 F | DIASTOLIC BLOOD PRESSURE: 72 MMHG | HEIGHT: 67 IN | SYSTOLIC BLOOD PRESSURE: 132 MMHG | HEART RATE: 77 BPM | WEIGHT: 166.69 LBS | BODY MASS INDEX: 26.16 KG/M2

## 2019-01-21 DIAGNOSIS — H26.9 CATARACT, UNSPECIFIED CATARACT TYPE, UNSPECIFIED LATERALITY: ICD-10-CM

## 2019-01-21 DIAGNOSIS — I10 ESSENTIAL HYPERTENSION: ICD-10-CM

## 2019-01-21 DIAGNOSIS — Z12.31 ENCOUNTER FOR SCREENING MAMMOGRAM FOR BREAST CANCER: ICD-10-CM

## 2019-01-21 DIAGNOSIS — E78.5 HYPERLIPIDEMIA, UNSPECIFIED HYPERLIPIDEMIA TYPE: ICD-10-CM

## 2019-01-21 DIAGNOSIS — Z01.818 PREOPERATIVE EXAMINATION: Primary | ICD-10-CM

## 2019-01-21 PROCEDURE — 99999 PR PBB SHADOW E&M-EST. PATIENT-LVL III: ICD-10-PCS | Mod: PBBFAC,,, | Performed by: INTERNAL MEDICINE

## 2019-01-21 PROCEDURE — 99999 PR PBB SHADOW E&M-EST. PATIENT-LVL III: CPT | Mod: PBBFAC,,, | Performed by: INTERNAL MEDICINE

## 2019-01-21 PROCEDURE — 99244 PR OFFICE CONSULTATION,LEVEL IV: ICD-10-PCS | Mod: S$GLB,,, | Performed by: INTERNAL MEDICINE

## 2019-01-21 PROCEDURE — 99244 OFF/OP CNSLTJ NEW/EST MOD 40: CPT | Mod: S$GLB,,, | Performed by: INTERNAL MEDICINE

## 2019-01-21 NOTE — PROGRESS NOTES
Chief complaint:  Preop    58-year-old black female   seen in consultation from Dr. Sanford soriano in Ophthalmology.  She has a moderate amount of reduced vision in both eyes secondary to bilateral cataracts.  She will be getting 1 of the cataracts surgically treated.  Will be done under monitored anesthesia and she has paperwork to complete.  She has not recently had any infectious or cardiopulmonary symptoms.    In review, she thought she had her mammogram last year but it looks like it was 2 years ago.  I reviewed the abnormal mammogram at the follow-up normal ultrasound.  She has not seen her gyn in a year.  Will arrange mammogram.      ROS:   CONST: weight stable. EYES: no vision change. ENT: no sore throat. CV: no chest pain w/ exertion. RESP: no shortness of breath. GI: no nausea, vomiting, diarrhea. No dysphagia. : no urinary issues. MUSCULOSKELETAL: no new myalgias or arthralgias. SKIN: no new changes. NEURO: no focal deficits. PSYCH: no new issues. ENDOCRINE: no polyuria. HEME: no lymph nodes. ALLERGY: no general pruritis.      Past medical history:  1.  Hypertension  2.  Hyperlipidemia  3.  Heart disease, seen by Dr. Tirado in the past and cardiac angiogram over 10 years ago showed only a 40% blockage- SARAH neg   4.  Normal colonoscopy at age 48, 10 years  5.  Mammograms done outside the system  6.  ALLERGIC rhinitis  7.  Vitamin D deficiency  8.  Positive PPD with normal chest x-rays  9.  Microcytic anemia  10.  Hyperplastic polyp on colonoscopy , 10 years    Social history:  with 2 children, occasional wine, never smoked, works as a rehabilitation technician    Family history: Mom  of kidney failure, father  of MI in his 70s, one brother  of pancreatic cancer and one brother being treated for lung cancer.  Both brothers were smokers.    Vital signs as above, Gen: no distress  EYES: conjunctiva clear, non-icteric, PERRL  ENT: nose clear, nasal mucosa normal, oropharynx clear  "and moist, teeth good  NECK:supple, thyroid non-palpable  RESP: effort is good, lungs clear  CV: heart RRR w/o murmur, gallops or rubs; no carotid bruits, no edema  GI: abdomen soft, non-distended, non-tender, no hepatosplenomegaly  MS: gait normal, no clubbing or cyanosis of the digits  SKIN: no rashes, warm to touch    Labs, x-rays, mammogram and ultrasound reviewed    Linh was seen today for pre-op exam.    Diagnoses and all orders for this visit:    Preoperative examination, low cardiopulmonary risk and she is cleared for monitored anesthesia, forms completed, faxing given back to patient    Cataract, unspecified cataract type, unspecified laterality, visual disturbance associated with cataracts    Encounter for screening mammogram for breast cancer, overdue  -     Mammo Digital Screening Bilat with CAD; Future    Essential hypertension, chronic and stable    Hyperlipidemia, unspecified hyperlipidemia type, chronic and stable              Clinical note will be sensitive aced upon the need to document the significantly late arrivals""This note will not be shared with the patient."  "

## 2019-02-18 ENCOUNTER — HOSPITAL ENCOUNTER (OUTPATIENT)
Dept: RADIOLOGY | Facility: HOSPITAL | Age: 59
Discharge: HOME | End: 2019-02-18
Attending: INTERNAL MEDICINE
Payer: COMMERCIAL

## 2019-02-18 DIAGNOSIS — Z12.31 ENCOUNTER FOR SCREENING MAMMOGRAM FOR BREAST CANCER: ICD-10-CM

## 2019-02-18 PROCEDURE — 77067 SCR MAMMO BI INCL CAD: CPT | Mod: TC,PO

## 2019-02-18 PROCEDURE — 77067 SCR MAMMO BI INCL CAD: CPT | Mod: 26,,, | Performed by: RADIOLOGY

## 2019-02-18 PROCEDURE — 77063 BREAST TOMOSYNTHESIS BI: CPT | Mod: 26,,, | Performed by: RADIOLOGY

## 2019-02-18 PROCEDURE — 77063 MAMMO DIGITAL SCREENING BILAT WITH TOMOSYNTHESIS_CAD: ICD-10-PCS | Mod: 26,,, | Performed by: RADIOLOGY

## 2019-02-18 PROCEDURE — 77067 MAMMO DIGITAL SCREENING BILAT WITH TOMOSYNTHESIS_CAD: ICD-10-PCS | Mod: 26,,, | Performed by: RADIOLOGY

## 2019-02-22 DIAGNOSIS — K21.9 GASTROESOPHAGEAL REFLUX DISEASE, ESOPHAGITIS PRESENCE NOT SPECIFIED: ICD-10-CM

## 2019-02-25 RX ORDER — OMEPRAZOLE 20 MG/1
CAPSULE, DELAYED RELEASE ORAL
Qty: 30 CAPSULE | Refills: 12 | Status: SHIPPED | OUTPATIENT
Start: 2019-02-25 | End: 2020-02-21 | Stop reason: SDUPTHER

## 2019-03-06 ENCOUNTER — TELEPHONE (OUTPATIENT)
Dept: FAMILY MEDICINE | Facility: CLINIC | Age: 59
End: 2019-03-06

## 2019-03-06 DIAGNOSIS — Z11.1 SCREENING-PULMONARY TB: Primary | ICD-10-CM

## 2019-03-06 NOTE — TELEPHONE ENCOUNTER
----- Message from Glenny Silva sent at 3/6/2019  7:23 AM CST -----  Contact: Self/[phone]  Type: Patient Call Back    Who called: Patient     What is the request in detail:  Patient called to inform the staff that her job requires her to have a TB Screening (Chest X Ray). Thank you.    Best call back number:  [phone]

## 2019-03-06 NOTE — TELEPHONE ENCOUNTER
Informed that PCP is out of town until 3/11/19. Need a X-ray for her job to confirm she does not have TB. She is allergic to the serum. Please leave message for Dr MYRICK to return.

## 2019-03-07 ENCOUNTER — HOSPITAL ENCOUNTER (OUTPATIENT)
Dept: RADIOLOGY | Facility: HOSPITAL | Age: 59
Discharge: HOME | End: 2019-03-07
Attending: INTERNAL MEDICINE
Payer: COMMERCIAL

## 2019-03-07 DIAGNOSIS — Z11.1 SCREENING-PULMONARY TB: ICD-10-CM

## 2019-03-07 PROCEDURE — 71046 X-RAY EXAM CHEST 2 VIEWS: CPT | Mod: TC,FY,PO

## 2019-03-07 PROCEDURE — 71046 X-RAY EXAM CHEST 2 VIEWS: CPT | Mod: 26,,, | Performed by: RADIOLOGY

## 2019-03-07 PROCEDURE — 71046 XR CHEST PA AND LATERAL: ICD-10-PCS | Mod: 26,,, | Performed by: RADIOLOGY

## 2019-03-11 ENCOUNTER — TELEPHONE (OUTPATIENT)
Dept: FAMILY MEDICINE | Facility: CLINIC | Age: 59
End: 2019-03-11

## 2019-03-11 NOTE — TELEPHONE ENCOUNTER
----- Message from Arminda Urena sent at 3/11/2019  8:59 AM CDT -----  Contact: Self   Patient would like to  a copy of her xray report. Please call when ready for  at [phone]

## 2019-06-10 ENCOUNTER — OFFICE VISIT (OUTPATIENT)
Dept: FAMILY MEDICINE | Facility: CLINIC | Age: 59
End: 2019-06-10
Payer: COMMERCIAL

## 2019-06-10 VITALS
SYSTOLIC BLOOD PRESSURE: 148 MMHG | WEIGHT: 164.38 LBS | HEART RATE: 97 BPM | OXYGEN SATURATION: 98 % | BODY MASS INDEX: 25.8 KG/M2 | DIASTOLIC BLOOD PRESSURE: 80 MMHG | HEIGHT: 67 IN

## 2019-06-10 DIAGNOSIS — T78.3XXA ANGIOEDEMA, INITIAL ENCOUNTER: Primary | ICD-10-CM

## 2019-06-10 DIAGNOSIS — N89.8 VAGINAL ITCHING: ICD-10-CM

## 2019-06-10 DIAGNOSIS — I10 ESSENTIAL HYPERTENSION: ICD-10-CM

## 2019-06-10 PROCEDURE — 99999 PR PBB SHADOW E&M-EST. PATIENT-LVL IV: ICD-10-PCS | Mod: PBBFAC,,, | Performed by: INTERNAL MEDICINE

## 2019-06-10 PROCEDURE — 3079F DIAST BP 80-89 MM HG: CPT | Mod: CPTII,S$GLB,, | Performed by: INTERNAL MEDICINE

## 2019-06-10 PROCEDURE — 3077F SYST BP >= 140 MM HG: CPT | Mod: CPTII,S$GLB,, | Performed by: INTERNAL MEDICINE

## 2019-06-10 PROCEDURE — 3079F PR MOST RECENT DIASTOLIC BLOOD PRESSURE 80-89 MM HG: ICD-10-PCS | Mod: CPTII,S$GLB,, | Performed by: INTERNAL MEDICINE

## 2019-06-10 PROCEDURE — 3077F PR MOST RECENT SYSTOLIC BLOOD PRESSURE >= 140 MM HG: ICD-10-PCS | Mod: CPTII,S$GLB,, | Performed by: INTERNAL MEDICINE

## 2019-06-10 PROCEDURE — 99999 PR PBB SHADOW E&M-EST. PATIENT-LVL IV: CPT | Mod: PBBFAC,,, | Performed by: INTERNAL MEDICINE

## 2019-06-10 PROCEDURE — 99214 OFFICE O/P EST MOD 30 MIN: CPT | Mod: S$GLB,,, | Performed by: INTERNAL MEDICINE

## 2019-06-10 PROCEDURE — 3008F BODY MASS INDEX DOCD: CPT | Mod: CPTII,S$GLB,, | Performed by: INTERNAL MEDICINE

## 2019-06-10 PROCEDURE — 3008F PR BODY MASS INDEX (BMI) DOCUMENTED: ICD-10-PCS | Mod: CPTII,S$GLB,, | Performed by: INTERNAL MEDICINE

## 2019-06-10 PROCEDURE — 99214 PR OFFICE/OUTPT VISIT, EST, LEVL IV, 30-39 MIN: ICD-10-PCS | Mod: S$GLB,,, | Performed by: INTERNAL MEDICINE

## 2019-06-10 RX ORDER — PREDNISONE 20 MG/1
40 TABLET ORAL DAILY
Qty: 10 TABLET | Refills: 0 | Status: SHIPPED | OUTPATIENT
Start: 2019-06-10 | End: 2019-06-15

## 2019-06-10 RX ORDER — HYDROCORTISONE 25 MG/G
CREAM TOPICAL 2 TIMES DAILY
Qty: 28 G | Refills: 0 | Status: SHIPPED | OUTPATIENT
Start: 2019-06-10 | End: 2023-10-27

## 2019-06-10 NOTE — PROGRESS NOTES
Subjective:     Chief Complaint  Chief Complaint   Patient presents with    swollen lips     started this morning, had shrimps over the weekend     vaginal itch x 1 week       HPI  Linh Clifton is a 59 y.o. female with medical diagnoses as listed in the medical history and problem list that presents for multiple complaints.  Last clinic visit was 1/21/2019.      Lip swelling since yesterday   Ate shrimp fried rice, pineapple and strawberries last night at a bridal shower as well as two glasses of wine  Took a benadryl last night - woke up around 2 A with worsening swelling of lips and midface  Last dose benadryl 25 mg around 3PM   Does not eat crawfish, crab or lobster    Vaginal itching for 7 days  Tried monistat and vagisil with no relief     Patient Care Team:  Feliciano Chandler MD as PCP - General (Internal Medicine)      PAST MEDICAL HISTORY:  Past Medical History:   Diagnosis Date    Anemia 4/16/2015    Coronary artery disease     GERD (gastroesophageal reflux disease)     Hyperlipidemia     Hypertension     Microcytic anemia 4/16/2015    Positive PPD 4/16/2015    Vitamin D deficiency disease 4/16/2015       PAST SURGICAL HISTORY:  Past Surgical History:   Procedure Laterality Date    BREAST BIOPSY      BREAST CYST EXCISION      COLONOSCOPY N/A 6/15/2018    [facility]    HYSTERECTOMY      PARTIAL HYSTERECTOMY      over 21 years ago       SOCIAL HISTORY:  Social History     Socioeconomic History    Marital status:      Spouse name: Not on file    Number of children: Not on file    Years of education: Not on file    Highest education level: Not on file   Occupational History    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     Employer: [employer]   Social Needs    Financial resource strain: Not on file    Food insecurity:     Worry: Not on file     Inability: Not on file    Transportation needs:     Medical: Not on file     Non-medical: Not on file   Tobacco  Use    Smoking status: Never Smoker    Smokeless tobacco: Never Used   Substance and Sexual Activity    Alcohol use: Yes     Alcohol/week: 0.6 oz     Types: 1 Glasses of wine per week     Comment: occasional drinking    Drug use: No    Sexual activity: Not on file   Lifestyle    Physical activity:     Days per week: Not on file     Minutes per session: Not on file    Stress: Not on file   Relationships    Social connections:     Talks on phone: Not on file     Gets together: Not on file     Attends Holiness service: Not on file     Active member of club or organization: Not on file     Attends meetings of clubs or organizations: Not on file     Relationship status: Not on file   Other Topics Concern    Not on file   Social History Narrative    Not on file       FAMILY HISTORY:  Family History   Problem Relation Age of Onset    Heart disease Father     Hyperlipidemia Father     Hyperlipidemia Mother     Kidney disease Mother     Cancer Brother         pancreas    Heart disease Brother     Breast cancer Neg Hx     Colon cancer Neg Hx     Ovarian cancer Neg Hx        ALLERGIES AND MEDICATIONS: updated and reviewed.  Review of patient's allergies indicates:  No Known Allergies  Current Outpatient Medications   Medication Sig Dispense Refill    acyclovir (ZOVIRAX) 400 MG tablet Take 1 tablet (400 mg total) by mouth 3 (three) times daily as needed. 30 tablet 11    aspirin 81 MG Chew Take 81 mg by mouth once daily.        atorvastatin (LIPITOR) 20 MG tablet Take 1 tablet by mouth daily 90 tablet 3    etodolac (LODINE) 400 MG tablet Take 1 tablet (400 mg total) by mouth 2 (two) times daily. 60 tablet 0    fluticasone (FLONASE) 50 mcg/actuation nasal spray 2 sprays by Each Nare route once daily. 16 g 0    hydroCHLOROthiazide (HYDRODIURIL) 25 MG tablet Take 1 tablet (25 mg total) by mouth once daily. 90 tablet 12    metoprolol succinate (TOPROL-XL) 25 MG 24 hr tablet Take 1 tablet (25 mg total) by  "mouth once daily. 30 tablet 11    omeprazole (PRILOSEC) 20 MG capsule TAKE 1 CAPSULE BY MOUTH ONCE DAILY 30 capsule 12    VITAMIN D2 50,000 unit capsule TAKE ONE CAPSULE BY MOUTH ONCE EVERY 7 DAYS 12 capsule 12    hydrocortisone 2.5 % cream Apply topically 2 (two) times daily. for 7 days 28 g 0    levocetirizine (XYZAL) 5 MG tablet Take 1 tablet (5 mg total) by mouth every evening. 30 tablet 0    predniSONE (DELTASONE) 20 MG tablet Take 2 tablets (40 mg total) by mouth once daily. for 5 days 10 tablet 0    zolpidem (AMBIEN) 10 mg Tab Take 1 tablet (10 mg total) by mouth nightly as needed. 30 tablet 5     No current facility-administered medications for this visit.          ROS:  Review of Systems   HENT: Voice change: hydrocorti.    Genitourinary: Negative for vaginal discharge.        Vaginal itching       Objective:       Physical Exam  Vitals:    06/10/19 0825   BP: (!) 140/70   BP Location: Right arm   Patient Position: Sitting   BP Method: Medium (Manual)   Pulse: 97   SpO2: 98%   Weight: 74.6 kg (164 lb 5.7 oz)   Height: 5' 7" (1.702 m)    Body mass index is 25.74 kg/m².  Weight: 74.6 kg (164 lb 5.7 oz)   Height: 5' 7" (170.2 cm)   Physical Exam   Constitutional: She appears well-developed. No distress.   HENT:   Head: Normocephalic and atraumatic.   Mouth/Throat: Oropharynx is clear and moist.   Significant lip swelling without oropharyngeal edema   Eyes: Conjunctivae and EOM are normal.   Neck: Normal range of motion. Neck supple. No thyromegaly present.   Cardiovascular: Normal rate.   Pulmonary/Chest: Effort normal.   Musculoskeletal: She exhibits no edema or deformity.   Lymphadenopathy:     She has no cervical adenopathy.   Neurological: She is alert. No cranial nerve deficit.   Skin: Skin is warm and dry. No rash noted.   Psychiatric: She has a normal mood and affect. Her behavior is normal.   Vitals reviewed.          Assessment:     1. Angioedema, initial encounter    2. Vaginal itching    3. " Essential hypertension      Plan:     Linh was seen today for swollen lips and vaginal itch x 1 week.    Diagnoses and all orders for this visit:    Angioedema, initial encounter  Discussed likely trigger (I.e., shellfish/shrimp) - counseled to avoid  Recommend daily antihistamine (e.g., Zyrtec)   Steroid burst prescribed   -     predniSONE (DELTASONE) 20 MG tablet; Take 2 tablets (40 mg total) by mouth once daily. for 5 days  -     Ambulatory referral to Allergy    Vaginal itching  -     hydrocortisone 2.5 % cream; Apply topically 2 (two) times daily. for 7 days    Essential hypertension  Blood pressure is not controlled today. We discussed low salt diet and regular exercise. Patient reports that they have not taken any decongestant or anti-inflammatory medication recently (patient educated that these medications can increase blood pressure).  Medication changes made today: None. Patient will come back in 2-4 weeks for recheck of blood pressure. Patient also asked to check blood pressure at home and bring recordings to next office visit. Patient is not eligible to enroll at this time in the digital hypertension program at this time.         Health Maintenance       Date Due Completion Date    Hepatitis C Screening 1960 ---    Shingles Vaccine (1 of 2) 05/18/2008 ---    Pap Smear 02/14/2018 2/14/2017    Influenza Vaccine 08/01/2019 10/9/2018    Override on 10/17/2017: Done    Override on 12/10/2014: Done    Aspirin/Antiplatelet Therapy 01/21/2020 1/21/2019    Mammogram 02/18/2020 2/18/2019    Override on 11/6/2013: Done    Lipid Panel 07/16/2023 7/16/2018    TETANUS VACCINE 10/17/2027 10/17/2017 (Done)    Override on 10/17/2017: Done    Colonoscopy 06/15/2028 6/15/2018    Override on 11/12/2008: Done            Health Maintenance reviewed and addressed as per orders - patient to make appointment for PAP smear    Follow up in about 2 weeks (around 6/24/2019) for nurse visit.    The patient expressed  understanding and no barriers to adherence were identified.     1. The patient indicates understanding of these issues and agrees with the plan. Brief care plan is updated and reviewed with the patient as applicable.     2. The patient is given an After Visit Summary that lists all medications with directions, allergies, orders placed during this encounter and follow-up instructions.     3. I have reviewed the patient's medical information including past medical, family, and social history sections including the medications and allergies.     4. We discussed the patient's current medications. I reconciled the patient's medication list and prepared and supplied needed refills.       Teodoro Abreu MD  Internal Medicine-Pediatrics

## 2019-06-10 NOTE — PATIENT INSTRUCTIONS
Please contact our Referrals Coordinator at [phone] if you have not received a call within 5 business days to check on the status of your referral (ALLERGY)    Angioedema  Angioedema (HP-iud-fl-eh-BARBARA-muh) is a sudden appearance of swollen patches (edema) on the skin or mucous membranes. It most often involves the face, lips, mouth, tongue, back of throat, or vocal cords. It may also occur in other places, such as the arms or legs. A rash may also appear during the first 4 days of this illness.  There are different types of angioedema. Your symptoms will depend on what type of angioedema you have. Swelling and redness may be the main symptoms. Like allergic reactions, angioedema may include:  · Rash, hives, redness, welts, blisters  · Itching, burning, stinging, pain  · Dry, flaky, cracking, or scaly skin  · Swelling of the face, lips, tongue, or other parts of the body  More severe symptoms may include:  · Trouble swallowing, or feeling like your throat is closing  · Trouble breathing or wheezing  · Hoarse voice or trouble speaking  · Nausea, vomiting, diarrhea, or stomach cramps  · Feeling faint or lightheaded, rapid heart rate, or low blood pressure  Angioedema can be triggered by exposure to certain substances. Medical conditions involving the immune systems and certain infections may cause it. In rare cases, angioedema can be hereditary. Sometimes the cause may be very clear. However, it is often hard to find a cause. The most common causes include:  · Foods, such as shrimp, shellfish, peanuts, milk products, gluten, and eggs; also colorings, flavorings, and additives  · Insect bites or stings, from bees, mosquitos, fleas, or ticks  · Medicines, such as ACE inhibitors, penicillin, sulfa drugs, amoxicillin, aspirin, and ibuprofen  · Latex, which may be in gloves, clothes, toys, balloons, and some kinds of tape. People who are allergic to latex may have problems with foods such as bananas, avocados, kiwi,  papaya, or chestnuts.  · Stress  · Heat, cold, or sunlight  The most common cause of angioedema is a reaction to a class of medicines called ACE inhibitors. These are used to treat high blood pressure. ACE inhibitors include captopril, enalapril, and lisinopril. Angiodema can happen even after you have been taking the medicine for some time. Tell your doctor if you have angioedema symptoms and are taking any of these medicines. Angioedema may recur. It is important to watch for the earliest signs of this condition (see the list below). Contact your healthcare provider right away if swelling involves the face, mouth, or throat.  Home care  Rest quietly today. Avoid vigorous physical activity.  Medicines: The healthcare provider may prescribe medicines for itching, swelling, or pain. Follow the healthcare providers instructions when taking these medicines.  · Oral diphenhydramine is an antihistamine available without a prescription. Unless a prescription antihistamine was given, diphenhydramine may be used to reduce widespread itching. It may make you sleepy, so be careful using it when going to school, working, or driving. (Note: Do not use diphenhydramine if you have glaucoma or if you are a man who has trouble urinating due to an enlarged prostate.) Loratadine is an antihistamine that may cause less drowsiness.  · Do not use diphenhydramine cream on your skin. Some people can have an allergic reaction to this.  · Calamine lotion or oatmeal baths sometimes help with itching.  · You may use acetaminophen or ibuprofen for pain, unless another pain medicine was prescribed.  · If you were told that your angioedema was caused by a medicine you are taking, you must stop taking it. Ask your healthcare provider for a different one. In the future, advise medical staff that you are allergic to this medicine.  · If medicine was prescribed, such as steroids or antihistamines, be sure you understand what the medicine is and how  to take it.   General care  · Make sure you do not scratch areas of the body that had a reaction. This will help prevent infection.   · Stay away from air pollution, tobacco, and wood smoke. Also stay away from cold temperatures. These things can make allergy symptoms worse.  · Try to find out what cause your reaction. Make sure to remove the allergen. Future reactions may be worse.   · If you have a serious allergy, wear a medical alert bracelet that notes this allergy.  · If the healthcare provider prescribed an epinephrine auto injector kit, keep it with you at all times.   · Tell all care providers about your allergy. Ask them h ow to use any prescribed medicines.  · Keep a record of allergies and symptoms, and when they occurred. This will help your provider treat you over time.   Follow-up care  Follow up with your healthcare provider, or as advised. You may need to see an allergist. An allergist can help find the cause of an allergic reaction and give recommendations on how to prevent future reactions.  Call 911  Contact emergency services right away if any of these occur:  · Trouble breathing or swallowing, or wheezing  · Hoarse voice or trouble speaking, or drooling  · Chest pain or tightness  · Confusion, lightheadedness, or dizziness  · Extreme drowsiness or trouble awakening  · Fainting or loss of consciousness  · Rapid heart rate  · Vomiting blood, or large amounts of blood in stool  · Seizure  · Nausea, vomiting, diarrhea, abdominal pain, or stomach cramps  When to seek medical attention  Call your healthcare provider right away if any of the following occur:  · Symptoms don't go away  · Symptoms come back  · Symptoms get worse or new symptoms develop  · Hives feel uncomfortable  · Fever of 100.4°F (38°C), or as directed by your healthcare professional  Date Last Reviewed: 5/1/2017  © 9984-3959 [employer]. [address] [place] [address], [place] [address]. All rights reserved. This information  is not intended as a substitute for professional medical care. Always follow your healthcare professional's instructions.

## 2019-06-10 NOTE — LETTER
Marichuy 10, 2019      [facility]  [address] [facility]  [place] [address]  Phone: [phone]  Fax: [phone]       Patient: Linh Clifton   YOB: 1960  Date of Visit: 06/10/2019    To Whom It May Concern:    Richie Clifton  was at [facility] on 06/10/2019. She is excused on 6/10 and 6/11. If you have any questions or concerns, or if I can be of further assistance, please do not hesitate to contact me.    Sincerely,    Teodoro Abreu MD

## 2019-06-11 ENCOUNTER — NURSE TRIAGE (OUTPATIENT)
Dept: ADMINISTRATIVE | Facility: CLINIC | Age: 59
End: 2019-06-11

## 2019-06-11 ENCOUNTER — OFFICE VISIT (OUTPATIENT)
Dept: FAMILY MEDICINE | Facility: CLINIC | Age: 59
End: 2019-06-11
Payer: COMMERCIAL

## 2019-06-11 VITALS
SYSTOLIC BLOOD PRESSURE: 144 MMHG | DIASTOLIC BLOOD PRESSURE: 74 MMHG | HEIGHT: 64 IN | OXYGEN SATURATION: 97 % | TEMPERATURE: 98 F | RESPIRATION RATE: 16 BRPM | HEART RATE: 93 BPM | WEIGHT: 163.81 LBS | BODY MASS INDEX: 27.96 KG/M2

## 2019-06-11 DIAGNOSIS — L50.9 HIVES: Primary | ICD-10-CM

## 2019-06-11 DIAGNOSIS — I10 ESSENTIAL HYPERTENSION: Chronic | ICD-10-CM

## 2019-06-11 DIAGNOSIS — T78.3XXD ANGIOEDEMA, SUBSEQUENT ENCOUNTER: ICD-10-CM

## 2019-06-11 PROCEDURE — 99999 PR PBB SHADOW E&M-EST. PATIENT-LVL IV: ICD-10-PCS | Mod: PBBFAC,,, | Performed by: NURSE PRACTITIONER

## 2019-06-11 PROCEDURE — 99999 PR PBB SHADOW E&M-EST. PATIENT-LVL IV: CPT | Mod: PBBFAC,,, | Performed by: NURSE PRACTITIONER

## 2019-06-11 PROCEDURE — 99214 PR OFFICE/OUTPT VISIT, EST, LEVL IV, 30-39 MIN: ICD-10-PCS | Mod: S$GLB,,, | Performed by: NURSE PRACTITIONER

## 2019-06-11 PROCEDURE — 3077F SYST BP >= 140 MM HG: CPT | Mod: CPTII,S$GLB,, | Performed by: NURSE PRACTITIONER

## 2019-06-11 PROCEDURE — 3008F PR BODY MASS INDEX (BMI) DOCUMENTED: ICD-10-PCS | Mod: CPTII,S$GLB,, | Performed by: NURSE PRACTITIONER

## 2019-06-11 PROCEDURE — 3078F PR MOST RECENT DIASTOLIC BLOOD PRESSURE < 80 MM HG: ICD-10-PCS | Mod: CPTII,S$GLB,, | Performed by: NURSE PRACTITIONER

## 2019-06-11 PROCEDURE — 3008F BODY MASS INDEX DOCD: CPT | Mod: CPTII,S$GLB,, | Performed by: NURSE PRACTITIONER

## 2019-06-11 PROCEDURE — 3077F PR MOST RECENT SYSTOLIC BLOOD PRESSURE >= 140 MM HG: ICD-10-PCS | Mod: CPTII,S$GLB,, | Performed by: NURSE PRACTITIONER

## 2019-06-11 PROCEDURE — 99214 OFFICE O/P EST MOD 30 MIN: CPT | Mod: S$GLB,,, | Performed by: NURSE PRACTITIONER

## 2019-06-11 PROCEDURE — 3078F DIAST BP <80 MM HG: CPT | Mod: CPTII,S$GLB,, | Performed by: NURSE PRACTITIONER

## 2019-06-11 RX ORDER — CHLORPHENIRAMINE MALEATE 4 MG
4 TABLET ORAL EVERY 6 HOURS PRN
COMMUNITY
End: 2023-10-27

## 2019-06-11 NOTE — LETTER
June 11, 2019      [facility]  [address] [facility]  [place] [address]  Phone: [phone]  Fax: [phone]       Patient: Linh Clifton   YOB: 1960  Date of Visit: 06/11/2019    To Whom It May Concern:    Richie Clifton  was at [facility] on 06/11/2019. She may return to work/school on 6/13/2019 with no restrictions. If you have any questions or concerns, or if I can be of further assistance, please do not hesitate to contact me.    Sincerely,    Leslie Rivera, DNP

## 2019-06-11 NOTE — PATIENT INSTRUCTIONS
When Your Child Has Hives (Urticaria) or Angioedema    Hives (also called urticaria) are raised, red, itchy bumps on the skin. The bumps come and go for a few days and then disappear completely. Although hives can be uncomfortable, they wont harm your child or leave scars. Sometimes your child may have severe swelling around the lips or eyes. This is a more serious skin reaction called angioedema. It can happen with hives or on its own.  What causes hives?  Hives often develop when cells in your childs skin release a chemical called histamine during an allergic reaction. The histamine produces swelling, redness, and itching. Here are some of the most common causes:  · Foods, such as peanuts, shellfish, tree nuts, eggs, and milk, and food additives, such as monosodium glutamate (MSG) and artificial colorings.  · Viral infections  · Prescription and over-the-counter medicines. These include antibiotics (penicillin), sulfa, anticonvulsant drugs, phenobarbital, aspirin, and ibuprofen.  · Extreme heat or cold:  ¨ Cold-induced hives. These hives are caused by exposure to cold air or water.  ¨ Solar hives. These hives are caused by exposure to sunlight or light bulb light.  · Emotional stress  · Dematographism. These hives are cause by scratching the skin, continual striking of the skin, or wearing tight-fitting clothes that rub the skin.  · Chronic urticaria. These are hives that keep coming back and with no known cause.  · Exercise-induced urticaria. These allergic symptoms are brought on by physical activity.  What do hives look like?  Hives are itchy bumps that can vary in color from pink to deep red. They come in different sizes and sometimes spread to form large patches of swollen skin. Hives can appear on one part of the body and disappear on another in a matter of hours. Each hive lasts less than a day, but new hives may keep forming for days or even weeks.  How are hives diagnosed?  Your childs healthcare  provider can diagnose hives by looking at your childs skin and taking a complete health history. He or she may also do skin tests. These look for foods or other substances that your child may be sensitive to. Blood tests may be done to rule out causes of hives not related to allergies. In most cases, the cause of hives is never found.  How are hives treated?  For mild symptoms:  · Give your child an oral over-the-counter antihistamine that has diphenhydramine. Talk about this with your child's healthcare provider  · To relieve itching and swelling, apply calamine lotion or cool compresses, or have your child soak in a cool bath. (Adding 2 cups of ground oatmeal to the tub may make your child more comfortable).  For more severe symptoms, your childs healthcare provider may prescribe:  · A prescription or over-the-counter oral antihistamine to block the chemical in the body that causes allergic reactions. Your child is likely to take it every 4 to 6 hours for several days. Some antihistamines may make your child drowsy. Some work faster than others. Ask your childs healthcare provider which antihistamine to use and the correct dose to give your child.  · An oral steroid to relieve severe swelling of the throat and airways. Its usually taken for 3 to 5 days.  · Epinephrine (adrenaline) to use in an emergency to stop a severe allergic reaction. If swelling affects your childs breathing, get emergency care RIGHT AWAY. Your child is likely to need an injection of epinephrine to stop the allergic response.  Angioedema  Angioedema is a type of allergic reaction that sometimes happens along with hives. It causes swelling deep in the skin, especially around the lips and eyes. Swelling can make it hard to breathe. If this happens, seek medical care right away.   Preventing hives  To help prevent hives, avoid any substances your child is sensitive to:  · If your child has food allergies: Read labels carefully, and use  caution in restaurants.  · Tell your childs healthcare provider, dentist, and pharmacist about any allergies your child has to medicines. Keep a list of alternate medicines handy.  Call 911 or emergency services right away  It is an emergency if your child has hives and any of the following:   · Wheezing, or trouble breathing or swallowing  · Swelling of the lips, tongue, or throat  · Dizziness  · Loss of consciousness   Date Last Reviewed: 12/1/2016  © 6398-6197 [employer]. [address] [place] [address], [place] [address]. All rights reserved. This information is not intended as a substitute for professional medical care. Always follow your healthcare professional's instructions.

## 2019-06-11 NOTE — TELEPHONE ENCOUNTER
Reason for Disposition   Hives have become worse and taking oral steroids (e.g., prednisone) > 24 hours    Protocols used: HIVES-A-OH    Pt was seen yesterday for angioedema and sent home on prednisone. Pt reports hives today. Scheduled to see Md today

## 2019-06-11 NOTE — PROGRESS NOTES
Subjective:        Chief Complaint  Chief Complaint   Patient presents with    Urticaria     Started Sunday hour after eating       HPI  Linh Clifton is a 59 y.o. female with multiple medical diagnoses as listed in the medical history and problem list that presents for right forearm hives.  Patient is new to me. Two hives on right forearm this morning. Reports same to behind right knee but have resolved. Has completed 2/5 doses of prednisone. Angioedema is significantly improving from evaluation yesterday.     Hx - at 19 years old, hives after eating crawfish no longer eats crawfish    HPI from 6/10/2019:  Linh Clifton is a 59 y.o. female with medical diagnoses as listed in the medical history and problem list that presents for multiple complaints.  Last clinic visit was 1/21/2019.       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    Vitamin D deficiency disease 4/16/2015       PAST SURGICAL HISTORY:  Past Surgical History:   Procedure Laterality Date    BREAST BIOPSY      BREAST CYST EXCISION      CATARACT EXTRACTION, BILATERAL Bilateral 02/2019    COLONOSCOPY N/A 6/15/2018    [facility]    HYSTERECTOMY      PARTIAL HYSTERECTOMY      over 21 years ago       SOCIAL HISTORY:  Social History     Socioeconomic History    Marital status:      Spouse name: Not on file    Number of children: Not on file    Years of education: Not on file    Highest  education level: Not on file   Occupational History    Occupation: [employer]     Employer: [employer]   Social Needs    Financial resource strain: Not on file    Food insecurity:     Worry: Not on file     Inability: Not on file    Transportation needs:     Medical: Not on file     Non-medical: Not on file   Tobacco Use    Smoking status: Never Smoker    Smokeless tobacco: Never Used   Substance and Sexual Activity    Alcohol use: Yes     Alcohol/week: 0.6 oz     Types: 1 Glasses of wine per week     Comment: occasional drinking    Drug use: No    Sexual activity: Not on file   Lifestyle    Physical activity:     Days per week: Not on file     Minutes per session: Not on file    Stress: Not on file   Relationships    Social connections:     Talks on phone: Not on file     Gets together: Not on file     Attends Anglican service: Not on file     Active member of club or organization: Not on file     Attends meetings of clubs or organizations: Not on file     Relationship status: Not on file   Other Topics Concern    Not on file   Social History Narrative    Not on file       FAMILY HISTORY:  Family History   Problem Relation Age of Onset    Heart disease Father     Hyperlipidemia Father     Hyperlipidemia Mother     Kidney disease Mother     Cancer Brother         pancreas    Heart disease Brother     Breast cancer Neg Hx     Colon cancer Neg Hx     Ovarian cancer Neg Hx        ALLERGIES AND MEDICATIONS: updated and reviewed.  Review of patient's allergies indicates:  No Known Allergies  Current Outpatient Medications   Medication Sig Dispense Refill    acyclovir (ZOVIRAX) 400 MG tablet Take 1 tablet (400 mg total) by mouth 3 (three) times daily as needed. 30 tablet 11    aspirin 81 MG Chew Take 81 mg by mouth once daily.        atorvastatin (LIPITOR) 20 MG tablet Take 1 tablet by mouth daily 90 tablet 3    chlorpheniramine (CHLOR-TRIMETON) 4 mg tablet Take 4 mg by mouth every 6  "(six) hours as needed for Allergies.      fluticasone (FLONASE) 50 mcg/actuation nasal spray 2 sprays by Each Nare route once daily. 16 g 0    hydroCHLOROthiazide (HYDRODIURIL) 25 MG tablet Take 1 tablet (25 mg total) by mouth once daily. 90 tablet 12    hydrocortisone 2.5 % cream Apply topically 2 (two) times daily. for 7 days 28 g 0    metoprolol succinate (TOPROL-XL) 25 MG 24 hr tablet Take 1 tablet (25 mg total) by mouth once daily. 30 tablet 11    omeprazole (PRILOSEC) 20 MG capsule TAKE 1 CAPSULE BY MOUTH ONCE DAILY 30 capsule 12    predniSONE (DELTASONE) 20 MG tablet Take 2 tablets (40 mg total) by mouth once daily. for 5 days 10 tablet 0    VITAMIN D2 50,000 unit capsule TAKE ONE CAPSULE BY MOUTH ONCE EVERY 7 DAYS 12 capsule 12    zolpidem (AMBIEN) 10 mg Tab Take 1 tablet (10 mg total) by mouth nightly as needed. 30 tablet 5     No current facility-administered medications for this visit.          ROS  Review of Systems   Constitutional: Negative for chills and fever.   HENT: Positive for facial swelling (lips). Negative for trouble swallowing.    Respiratory: Negative for chest tightness, shortness of breath and wheezing.    Cardiovascular: Negative for chest pain.   Skin: Positive for rash (right forearm).   Neurological: Negative for headaches.   Psychiatric/Behavioral: Negative for behavioral problems and confusion.         Objective:     Physical Exam  Vitals:    06/11/19 1140   BP: (!) 144/74   BP Location: Left arm   Patient Position: Sitting   BP Method: Small (Manual)   Pulse: 93   Resp: 16   Temp: 98.1 °F (36.7 °C)   TempSrc: Oral   SpO2: 97%   Weight: 74.3 kg (163 lb 12.8 oz)   Height: 5' 4" (1.626 m)    Body mass index is 28.12 kg/m².  Weight: 74.3 kg (163 lb 12.8 oz)   Height: 5' 4" (162.6 cm)   Physical Exam   Constitutional: She is oriented to person, place, and time. She appears well-developed and well-nourished.   HENT:   Head: Normocephalic and atraumatic.   Right Ear: External ear " normal.   Left Ear: External ear normal.   Nose: Nose normal.   Mouth/Throat: Oropharynx is clear and moist.   Mild edema to lips   Eyes: EOM are normal.   Neck: Neck supple.   Cardiovascular: Normal rate and regular rhythm.   Pulmonary/Chest: Effort normal and breath sounds normal.   Abdominal: Soft.   Musculoskeletal: Normal range of motion.   Neurological: She is alert and oriented to person, place, and time.   Skin: Skin is warm, dry and intact. Capillary refill takes less than 2 seconds. Rash noted. Rash is urticarial (two on right forearm).   Psychiatric: She has a normal mood and affect.   Vitals reviewed.      Assessment:     1. Hives    2. Angioedema, subsequent encounter    3. Essential hypertension      Plan:     Linh was seen today for urticaria.    Diagnoses and all orders for this visit:    Hives  Angioedema, subsequent encounter  1. Continue prednisone burst as it is improving the angioedema  2. Use topical hydrocortisone as needed  3. Discussed RTC precuations - worsening hives, difficulty breathing/swallowing    Essential hypertension  -     Hypertension Digital Medicine (HDMP) Enrollment Order  -     Hypertension Digital Medicine ([facility]): Assign Onboarding Questionnaires  -     Discussed getting supplies and set up with [facility]-      Health Maintenance       Date Due Completion Date    Hepatitis C Screening 1960 ---    Shingles Vaccine (1 of 2) 05/18/2008 ---    Pap Smear 02/14/2018 2/14/2017    Influenza Vaccine 08/01/2019 10/9/2018    Override on 10/17/2017: Done    Override on 12/10/2014: Done    Mammogram 02/18/2020 2/18/2019    Override on 11/6/2013: Done    Aspirin/Antiplatelet Therapy 06/10/2020 6/10/2019    Lipid Panel 07/16/2023 7/16/2018    TETANUS VACCINE 10/17/2027 10/17/2017 (Done)    Override on 10/17/2017: Done    Colonoscopy 06/15/2028 6/15/2018    Override on 11/12/2008: Done          Health Maintenance reviewed, addressed as per orders    Follow-up: Follow up in about 3  days (around 6/14/2019), or if symptoms worsen or fail to improve, for Re-evaluation.    The patient expressed understanding and no barriers to adherence were identified.     1. The patient indicates understanding of these issues and agrees with the plan. Brief care plan is updated and reviewed with the patient as applicable.     2. The patient is given an After Visit Summary that lists all medications with directions, allergies, orders placed during this encounter and follow-up instructions.     3. I have reviewed the patient's medical information including past medical, family, and social history sections including the medications and allergies.     4. We discussed the patient's current medications. I reconciled the patient's medication list and prepared and supplied needed refills.       Leslie Rivera, DNP, APRN, FNP-c  Family Medicine    My collaborating physicians are Dr. Je Nassar and Dr. Teodoro Abreu

## 2019-06-12 ENCOUNTER — TELEPHONE (OUTPATIENT)
Dept: FAMILY MEDICINE | Facility: CLINIC | Age: 59
End: 2019-06-12

## 2019-06-12 DIAGNOSIS — L50.9 URTICARIA: ICD-10-CM

## 2019-06-12 DIAGNOSIS — L50.9 URTICARIA: Primary | ICD-10-CM

## 2019-06-12 RX ORDER — HYDROXYZINE HYDROCHLORIDE 25 MG/1
25 TABLET, FILM COATED ORAL EVERY 4 HOURS PRN
Qty: 45 TABLET | Refills: 0 | Status: SHIPPED | OUTPATIENT
Start: 2019-06-12 | End: 2019-06-12 | Stop reason: SDUPTHER

## 2019-06-12 RX ORDER — HYDROXYZINE HYDROCHLORIDE 25 MG/1
25 TABLET, FILM COATED ORAL EVERY 4 HOURS PRN
Qty: 45 TABLET | Refills: 0 | Status: SHIPPED | OUTPATIENT
Start: 2019-06-12 | End: 2019-07-12

## 2019-06-12 RX ORDER — HYDROXYZINE HYDROCHLORIDE 25 MG/1
25 TABLET, FILM COATED ORAL EVERY 4 HOURS PRN
Qty: 45 TABLET | Refills: 0 | Status: SHIPPED | OUTPATIENT
Start: 2019-06-12 | End: 2019-06-12

## 2019-06-12 NOTE — TELEPHONE ENCOUNTER
Spoke with patient and informed her that Dr. Abreu prescribed the atarax for her for itching, she can take up to 4 times a day. Patient also has a referral to allergy. Patient was informed of this information and verbalized understanding

## 2019-06-12 NOTE — TELEPHONE ENCOUNTER
Sent ATARAX for severe itching to preferred pharmacy on file ([facility])  Can take up to 4 times daily as needed    Patient should also can referral coordinator to schedule ALLERGY appointment ( [phone] )

## 2019-06-12 NOTE — TELEPHONE ENCOUNTER
Patient calling to state that she has hives on both arms, didn't sleep at all last night, still itching, she is taking the prednisone, was instructed not to take bendryl. Patient wants to know what can she do at this point. Please advise

## 2019-06-12 NOTE — TELEPHONE ENCOUNTER
----- Message from Sly Saini sent at 6/12/2019  7:59 AM CDT -----  Contact: Self/[phone]  Type: Patient Call Back    Who called:Patient    What is the request in detail:The patient would like to speak to the staff regarding her visit on yesterday.    Would the patient rather a call back or a response via [facility]? Call back    Best call back number:[phone]      Thank you

## 2019-06-20 ENCOUNTER — TELEPHONE (OUTPATIENT)
Dept: ALLERGY | Facility: CLINIC | Age: 59
End: 2019-06-20

## 2019-06-20 NOTE — TELEPHONE ENCOUNTER
Called and left a message for the patient to give us a call back in regards to rescheduling her appointment.

## 2019-07-01 ENCOUNTER — OFFICE VISIT (OUTPATIENT)
Dept: ALLERGY | Facility: CLINIC | Age: 59
End: 2019-07-01
Payer: COMMERCIAL

## 2019-07-01 VITALS
SYSTOLIC BLOOD PRESSURE: 128 MMHG | BODY MASS INDEX: 28.04 KG/M2 | WEIGHT: 164.25 LBS | HEIGHT: 64 IN | DIASTOLIC BLOOD PRESSURE: 88 MMHG

## 2019-07-01 DIAGNOSIS — D50.9 MICROCYTIC ANEMIA: ICD-10-CM

## 2019-07-01 DIAGNOSIS — L50.1 IDIOPATHIC URTICARIA: ICD-10-CM

## 2019-07-01 DIAGNOSIS — J31.0 CHRONIC RHINITIS: ICD-10-CM

## 2019-07-01 DIAGNOSIS — I10 ESSENTIAL HYPERTENSION: Chronic | ICD-10-CM

## 2019-07-01 DIAGNOSIS — T78.3XXA ANGIOEDEMA, INITIAL ENCOUNTER: Primary | ICD-10-CM

## 2019-07-01 PROCEDURE — 99244 PR OFFICE CONSULTATION,LEVEL IV: ICD-10-PCS | Mod: S$GLB,,, | Performed by: ALLERGY & IMMUNOLOGY

## 2019-07-01 PROCEDURE — 99999 PR PBB SHADOW E&M-EST. PATIENT-LVL III: ICD-10-PCS | Mod: PBBFAC,,, | Performed by: ALLERGY & IMMUNOLOGY

## 2019-07-01 PROCEDURE — 99999 PR PBB SHADOW E&M-EST. PATIENT-LVL III: CPT | Mod: PBBFAC,,, | Performed by: ALLERGY & IMMUNOLOGY

## 2019-07-01 PROCEDURE — 99244 OFF/OP CNSLTJ NEW/EST MOD 40: CPT | Mod: S$GLB,,, | Performed by: ALLERGY & IMMUNOLOGY

## 2019-07-01 NOTE — PROGRESS NOTES
Linh Clifton is referred by Dr. Teodoro Abreu for a consult regarding urticaria and angioedema.  She is here alone.    She went to a wedding shower on Sunday evening June 9 2019.  She ate shrimp fried rice, potato salad, green beans, pineapple, strawberry, turkey, and white wine.    When she got home that night she had swelling of her lips.  She took one Benadryl and went to bed.  She woke up around 3:30 a.m. and continued to have swelling.  She took two more Benadryl.      She was seen in the office the following morning.  She was prescribed prednisone 40 milligrams a day for five days.  Her lip swelling lasted about three days before resolving.    On Tuesday morning she developed urticarial lesions on her right arm and back that were red, raised, and pruritic.  She was again seen in the office and was given Atarax.  She took 25 milligrams for 5 times that day.  It did cause drowsiness.    The following day she took one in the morning and one at bedtime.  She did not have any further urticaria.  She has not had any angioedema.    She has been concerned to eat shrimp and different fruits including strawberries, pineapple, watermelon, and cantaloupe.    She denies any thyroid disease.    She does have chronic sinus with itching and ringing of the ears, postnasal drip, sensation that something is in the back of the throat, frequent throat clearing, sore throats, and hoarseness.    She denies any conjunctivitis, cough, wheezing, shortness of breath, or history of asthma.    She takes Chlor-Trimeton about 3 times a week.  She uses Flonase about once a week.  She does think that these help with her symptoms.    She does have gastroesophageal reflux disease that is controlled on omeprazole 20 milligrams a day.  She has been taking this about two years.    For ROS, FH, SH please see Allergy and Asthma Questionnaire dated today.    Some relevant pertinent positives:    Review of Systems/PMH:  She does have hypertension  and takes hydrochlorothiazide and metoprolol.  She does not have any chest pain.    Family History:  Negative for atopic disease.    Home environment:  She has lived in the same house for the past 35 years.  There has been no water damage.  There is no evidence of mold.  There is no carpeting in the bedroom.  There are no pets.    Social History:  She is a nonsmoker.  She works as rehabilitation technician.    Physical Examination:  General: Well-developed, well-nourished, no acute distress.  Head: No sinus tenderness.  Eyes: Conjunctivae:  No bulbar or palpebral conjunctival injection.  Ears: EAC's clear.  TM's clear.  No pre-auricular nodes.  Nose: Nasal Mucosa:  Pink.  Septum: No apparent deviation.  Turbinates:  No significant edema.  Polyps/Mass:  None visible.  Teeth/Gums:  No bleeding noted.  Oropharynx: No exudates.  Neck: Supple without thyromegaly. No cervical lymphadenopathy.    Respiratory/Chest: Effort: Good.  Auscultation:  Clear bilaterally.  Cardiovascular:  No murmur, rubs, or gallop heard.   GI:  Non-tender.  No masses.  No organomegaly.  Extremities:  No cyanosis, clubbing, or edema.  Skin: Good turgor.  No urticaria or angioedema.  Neuro/Psych: Oriented x 3.    Pictures were reviewed on her cell phone on July 1, 2019 and are consistent with angioedema of the lips.    Laboratory 06/07/2016:  CBC:  Hemoglobin 10.6, hematocrit 34.9, microcytic indices.  CMP:  CO2 32.  Lipid panel:  Cholesterol 162.  Serum iron 66, transferrin P 55, TIBC 377, saturated iron 18.  Vitamin-D Level:  43.  TSH:  0.908.  Ferritin:  55.    Laboratory 7/16/2018:  CBC:  Hemoglobin 10.2, hematocrit 34.3, microcytic indices.  CMP:  CO2 30, albumin 3.4.  Iron 468, transferrin 297, TIBC 440, saturated iron 11.  Ferritin:  14.  TSH:  1.136.  Vitamin-D Level:  31.  Hemoglobin electrophoresis:  Consistent with beta thalassemia trait.    Assessment:  1.  Acute urticaria and angioedema of uncertain etiology, doubt food allergy.  2.   Chronic rhinitis, consider allergic.  3.  GERD.  4.  Consider LPR.  5.  Hypertension on metoprolol.  6.  Chronic anemia, consider iron deficiency.  7.  Beta thalassemia trait.    Recommendations:  1.  Laboratory as ordered.  2.  Take pictures of any further lesions.  3.  Consider skin testing foods if needed off antihistamines and beta blockade.  4.  Return to clinic in two weeks.

## 2019-07-01 NOTE — LETTER
July 1, 2019      Teodoro Abreu MD  [address]  [place] [address]           [facility]  [address]  [facility] [address]  Phone: [phone]  Fax: [phone]          Patient: Linh Clifton   MR Number: [id number]   YOB: 1960   Date of Visit: 7/1/2019       Dear Dr. Teodoro Abreu:    Thank you for referring Linh Clifton to me for evaluation. Attached you will find relevant portions of my assessment and plan of care.    If you have questions, please do not hesitate to call me. I look forward to following Linh Clifton along with you.    Sincerely,    MANNY Koehler III, MD    Enclosure  CC:  No Recipients    If you would like to receive this communication electronically, please contact [email or web address] or [phone] to request more information on [email or web address] Link access.    For providers and/or their staff who would like to refer a patient to [facility], please contact us through our one-stop-shop provider referral line, [facility], at [phone].    If you feel you have received this communication in error or would no longer like to receive these types of communications, please e-mail [email or web address]

## 2019-07-09 RX ORDER — METOPROLOL SUCCINATE 25 MG/1
25 TABLET, EXTENDED RELEASE ORAL DAILY
Qty: 30 TABLET | Refills: 12 | Status: SHIPPED | OUTPATIENT
Start: 2019-07-09 | End: 2020-07-21

## 2019-07-09 NOTE — TELEPHONE ENCOUNTER
----- Message from Eliza Patino sent at 7/9/2019 11:49 AM CDT -----  Contact: Patient -[phone]  Type: RX Refill Request    Who Called: Patient    Refill or New Rx: Refill    RX Name and Strength: metoprolol succinate (TOPROL-XL) 25 MG 24 hr tablet    Is this a 30 day or 90 day RX: 90 day    Preferred Pharmacy with phone number: .  [facility] [address] [place], [place]  [address] [place] [address]  Phone: [phone] Fax: [phone]    Local or Mail Order: Local    Would the patient rather a call back or a response via [facility]? Call back    Best Call Back Number: [phone]    Additional Information: Patient states that she's out of her medication. Been out for 6 day. Please call in ASAP!

## 2019-07-13 RX ORDER — ATORVASTATIN CALCIUM 20 MG/1
TABLET, FILM COATED ORAL
Qty: 90 TABLET | Refills: 1 | Status: SHIPPED | OUTPATIENT
Start: 2019-07-13 | End: 2019-07-22 | Stop reason: SDUPTHER

## 2019-07-14 RX ORDER — ERGOCALCIFEROL 1.25 MG/1
CAPSULE ORAL
Qty: 12 CAPSULE | Refills: 12 | Status: SHIPPED | OUTPATIENT
Start: 2019-07-14 | End: 2019-07-22 | Stop reason: SDUPTHER

## 2019-07-16 RX ORDER — HYDROCHLOROTHIAZIDE 25 MG/1
TABLET ORAL
Qty: 90 TABLET | Refills: 12 | Status: SHIPPED | OUTPATIENT
Start: 2019-07-16 | End: 2019-07-22 | Stop reason: SDUPTHER

## 2019-07-22 ENCOUNTER — OFFICE VISIT (OUTPATIENT)
Dept: FAMILY MEDICINE | Facility: CLINIC | Age: 59
End: 2019-07-22
Payer: COMMERCIAL

## 2019-07-22 VITALS
BODY MASS INDEX: 28.46 KG/M2 | HEIGHT: 64 IN | DIASTOLIC BLOOD PRESSURE: 70 MMHG | TEMPERATURE: 98 F | WEIGHT: 166.69 LBS | SYSTOLIC BLOOD PRESSURE: 138 MMHG | HEART RATE: 89 BPM | OXYGEN SATURATION: 95 %

## 2019-07-22 DIAGNOSIS — E78.5 HYPERLIPIDEMIA, UNSPECIFIED HYPERLIPIDEMIA TYPE: ICD-10-CM

## 2019-07-22 DIAGNOSIS — T78.3XXD ANGIOEDEMA, SUBSEQUENT ENCOUNTER: Primary | ICD-10-CM

## 2019-07-22 DIAGNOSIS — I10 ESSENTIAL HYPERTENSION: Chronic | ICD-10-CM

## 2019-07-22 DIAGNOSIS — E55.9 VITAMIN D DEFICIENCY DISEASE: ICD-10-CM

## 2019-07-22 PROCEDURE — 99999 PR PBB SHADOW E&M-EST. PATIENT-LVL III: CPT | Mod: PBBFAC,,, | Performed by: INTERNAL MEDICINE

## 2019-07-22 PROCEDURE — 3008F BODY MASS INDEX DOCD: CPT | Mod: CPTII,S$GLB,, | Performed by: INTERNAL MEDICINE

## 2019-07-22 PROCEDURE — 99999 PR PBB SHADOW E&M-EST. PATIENT-LVL III: ICD-10-PCS | Mod: PBBFAC,,, | Performed by: INTERNAL MEDICINE

## 2019-07-22 PROCEDURE — 99214 OFFICE O/P EST MOD 30 MIN: CPT | Mod: S$GLB,,, | Performed by: INTERNAL MEDICINE

## 2019-07-22 PROCEDURE — 99214 PR OFFICE/OUTPT VISIT, EST, LEVL IV, 30-39 MIN: ICD-10-PCS | Mod: S$GLB,,, | Performed by: INTERNAL MEDICINE

## 2019-07-22 PROCEDURE — 3075F PR MOST RECENT SYSTOLIC BLOOD PRESS GE 130-139MM HG: ICD-10-PCS | Mod: CPTII,S$GLB,, | Performed by: INTERNAL MEDICINE

## 2019-07-22 PROCEDURE — 3078F PR MOST RECENT DIASTOLIC BLOOD PRESSURE < 80 MM HG: ICD-10-PCS | Mod: CPTII,S$GLB,, | Performed by: INTERNAL MEDICINE

## 2019-07-22 PROCEDURE — 3078F DIAST BP <80 MM HG: CPT | Mod: CPTII,S$GLB,, | Performed by: INTERNAL MEDICINE

## 2019-07-22 PROCEDURE — 3075F SYST BP GE 130 - 139MM HG: CPT | Mod: CPTII,S$GLB,, | Performed by: INTERNAL MEDICINE

## 2019-07-22 PROCEDURE — 3008F PR BODY MASS INDEX (BMI) DOCUMENTED: ICD-10-PCS | Mod: CPTII,S$GLB,, | Performed by: INTERNAL MEDICINE

## 2019-07-22 RX ORDER — ATORVASTATIN CALCIUM 20 MG/1
TABLET, FILM COATED ORAL
Qty: 90 TABLET | Refills: 12 | Status: SHIPPED | OUTPATIENT
Start: 2019-07-22 | End: 2020-07-28

## 2019-07-22 RX ORDER — ERGOCALCIFEROL 1.25 MG/1
50000 CAPSULE ORAL
Qty: 12 CAPSULE | Refills: 12 | Status: SHIPPED | OUTPATIENT
Start: 2019-07-22 | End: 2020-10-05

## 2019-07-22 RX ORDER — HYDROCHLOROTHIAZIDE 25 MG/1
25 TABLET ORAL DAILY
Qty: 90 TABLET | Refills: 12 | Status: SHIPPED | OUTPATIENT
Start: 2019-07-22 | End: 2020-07-28

## 2019-07-22 NOTE — PROGRESS NOTES
Chief complaint:  Follow-up from allergic reaction    59-year-old black female  patient awoke in the middle night with a swollen face.  She had no trouble swallowing or breathing.  She has had no subsequent reaction.  Reviewed her negative allergy testing and I encouraged her to make follow-up with allergy as she does need to get an interpretation other than the test being negative was still likelihood that she could still be allergic to certain foods and what reaction she should watch out for and should she or should she not have an EpiPen on hand and so forth.  She did have Ambien on the list but has not taken that medication and appears and about for 5 years.  No other obvious exposures, medications and so forth that could have led to the reaction.  Records reviewed and she was counseled.Total time over 25 minutes with over 50% counseling.  She needed medications refilled      Allergy no reviewed:  She went to a wedding shower on Sunday evening June 9 2019.  She ate shrimp fried rice, potato salad, green beans, pineapple, strawberry, turkey, and white wine.     When she got home that night she had swelling of her lips.  She took one Benadryl and went to bed.  She woke up around 3:30 a.m. and continued to have swelling.  She took two more Benadryl.       She was seen in the office the following morning.  She was prescribed prednisone 40 milligrams a day for five days.  Her lip swelling lasted about three days before resolving.     On Tuesday morning she developed urticarial lesions on her right arm and back that were red, raised, and pruritic.  She was again seen in the office and was given Atarax.  She took 25 milligrams for 5 times that day.  It did cause drowsiness.     The following day she took one in the morning and one at bedtime.  She did not have any further urticaria.  She has not had any angioedema.     She has been concerned to eat shrimp and different fruits including strawberries, pineapple,  watermelon, and cantaloupe.    Assessment:  1.  Acute urticaria and angioedema of uncertain etiology, doubt food allergy.  2.  Chronic rhinitis, consider allergic.    ROS:   CONST: weight stable. EYES: no vision change. ENT: no sore throat. CV: no chest pain w/ exertion. RESP: no shortness of breath. GI: no nausea, vomiting, diarrhea. No dysphagia. : no urinary issues. MUSCULOSKELETAL: no new myalgias or arthralgias. SKIN: no new changes. NEURO: no focal deficits. PSYCH: no new issues. ENDOCRINE: no polyuria. HEME: no lymph nodes. ALLERGY: no general pruritis.      Past medical history:  1.  Hypertension  2.  Hyperlipidemia  3.  Heart disease, seen by Dr. Tirado in the past and cardiac angiogram over 10 years ago showed only a 40% blockage- SARAH neg   4.  Normal colonoscopy at age 48, 10 years  5.  Mammograms done outside the system  6.  ALLERGIC rhinitis  7.  Vitamin D deficiency  8.  Positive PPD with normal chest x-rays  9.  Microcytic anemia  10.  Hyperplastic polyp on colonoscopy , 10 years    Social history:  with 2 children, occasional wine, never smoked, works as a rehabilitation technician    Family history: Mom  of kidney failure, father  of MI in his 70s, one brother  of pancreatic cancer and one brother being treated for lung cancer.  Both brothers were smokers.    Vital signs as above, Gen: no distress  No swelling of the face lips or tongue    Linh was seen today for follow-up.    Diagnoses and all orders for this visit:    Angioedema, subsequent encounter, reviewed pictures on her phone and it does look like fairly classic angioedema but no particular trigger for which she will follow up to discuss further with allergy    Vitamin D deficiency disease, labs reviewed, normal level, continue supplement    Essential hypertension, chronic and stable, med refilled    Hyperlipidemia, unspecified hyperlipidemia type, chronic and stable, med refilled    Other orders  -      "ergocalciferol (VITAMIN D2) 50,000 unit Cap; Take 1 capsule (50,000 Units total) by mouth every 7 days.  -     atorvastatin (LIPITOR) 20 MG tablet; TAKE 1 TABLET BY MOUTH ONCE DAILY  -     hydroCHLOROthiazide (HYDRODIURIL) 25 MG tablet; Take 1 tablet (25 mg total) by mouth once daily.        Clinical will be sensitive as patient admittedly not active on the computer system and do not want any others active to take things out of context"This note will not be shared with the patient."         Clinical note will be sensitive aced upon the need to document the significantly late arrivals"  "

## 2019-08-14 ENCOUNTER — OFFICE VISIT (OUTPATIENT)
Dept: ALLERGY | Facility: CLINIC | Age: 59
End: 2019-08-14
Payer: COMMERCIAL

## 2019-08-14 VITALS
DIASTOLIC BLOOD PRESSURE: 66 MMHG | SYSTOLIC BLOOD PRESSURE: 140 MMHG | HEIGHT: 65 IN | WEIGHT: 168 LBS | BODY MASS INDEX: 27.99 KG/M2

## 2019-08-14 DIAGNOSIS — L50.1 IDIOPATHIC URTICARIA: ICD-10-CM

## 2019-08-14 DIAGNOSIS — J31.0 CHRONIC RHINITIS: ICD-10-CM

## 2019-08-14 DIAGNOSIS — I10 ESSENTIAL HYPERTENSION: Chronic | ICD-10-CM

## 2019-08-14 DIAGNOSIS — T78.3XXD ANGIOEDEMA, SUBSEQUENT ENCOUNTER: Primary | ICD-10-CM

## 2019-08-14 PROCEDURE — 99999 PR PBB SHADOW E&M-EST. PATIENT-LVL III: ICD-10-PCS | Mod: PBBFAC,,, | Performed by: ALLERGY & IMMUNOLOGY

## 2019-08-14 PROCEDURE — 99214 PR OFFICE/OUTPT VISIT, EST, LEVL IV, 30-39 MIN: ICD-10-PCS | Mod: S$GLB,,, | Performed by: ALLERGY & IMMUNOLOGY

## 2019-08-14 PROCEDURE — 99999 PR PBB SHADOW E&M-EST. PATIENT-LVL III: CPT | Mod: PBBFAC,,, | Performed by: ALLERGY & IMMUNOLOGY

## 2019-08-14 PROCEDURE — 99214 OFFICE O/P EST MOD 30 MIN: CPT | Mod: S$GLB,,, | Performed by: ALLERGY & IMMUNOLOGY

## 2019-08-14 PROCEDURE — 3008F BODY MASS INDEX DOCD: CPT | Mod: CPTII,S$GLB,, | Performed by: ALLERGY & IMMUNOLOGY

## 2019-08-14 PROCEDURE — 3077F SYST BP >= 140 MM HG: CPT | Mod: CPTII,S$GLB,, | Performed by: ALLERGY & IMMUNOLOGY

## 2019-08-14 PROCEDURE — 3078F PR MOST RECENT DIASTOLIC BLOOD PRESSURE < 80 MM HG: ICD-10-PCS | Mod: CPTII,S$GLB,, | Performed by: ALLERGY & IMMUNOLOGY

## 2019-08-14 PROCEDURE — 3078F DIAST BP <80 MM HG: CPT | Mod: CPTII,S$GLB,, | Performed by: ALLERGY & IMMUNOLOGY

## 2019-08-14 PROCEDURE — 3077F PR MOST RECENT SYSTOLIC BLOOD PRESSURE >= 140 MM HG: ICD-10-PCS | Mod: CPTII,S$GLB,, | Performed by: ALLERGY & IMMUNOLOGY

## 2019-08-14 PROCEDURE — 3008F PR BODY MASS INDEX (BMI) DOCUMENTED: ICD-10-PCS | Mod: CPTII,S$GLB,, | Performed by: ALLERGY & IMMUNOLOGY

## 2019-08-14 RX ORDER — HYDROXYZINE HYDROCHLORIDE 25 MG/1
25 TABLET, FILM COATED ORAL
COMMUNITY
End: 2023-10-27

## 2019-08-14 NOTE — PROGRESS NOTES
Linh Clifton returns to clinic today for continued evaluation of urticaria angioedema.  She is here alone.  She she works as a medical rehab technician.    Her initial episode of urticaria and angioedema occurred she thinks now on May 29, 2019.  She had had shrimp fried rice, potato salad, green beans, pineapple, strawberry, turkey, and white wine.    Since that time she has been avoiding all shellfish and fruits.  She has eaten potatoes without any difficulty.  She has also had fried catfish twice without any difficulty.    This past Monday she had [employer] fried chicken at noon for lunch.  About 4:00 p.m. she developed swelling of her right lip.  She took Atarax 25 milligrams and it began to resolve.  She has continued to have some mild swelling of the right upper lip since then.  She did not have any urticaria.    She has he fried chicken not from [employer] without any problem.    She has continued to take metoprolol for hypertension.    We had discussed food skin testing at this visit but she did not stop her metoprolol and she just took hydroxyzine.    She has not had any further urticaria or angioedema.    Her rhinitis has been controlled.  She has not had any conjunctivitis.  She denies any cough, wheezing, or shortness of breath.    [facility] PEQ ALLERGY QUESTIONNAIRE SHORT 8/14/2019   Are you taking any new medications since your last visit? Yes   Constitution: No symptoms   Head or facial pain: No symptoms   Eyes: No symptoms   Ears: Fullness, Pressure   Nose: Post nasal drip, Sniffling   Throat: Frequent clearing   Sinuses: No symptoms   Lungs: No symptoms   Skin: Swelling   Cardiovascular: No symptoms   Gastrointestinal: No symptoms   Genital/ urinary No symptoms   Musculoskeletal: Muscle pain, Joint pain, Joint swelling   Neurologic: No symptoms   Endocrine: No symptoms   Hematologic: No symptoms     General: Well-developed, well-nourished, no acute distress.  Head: No sinus tenderness.  Eyes: Conjunctivae:   No bulbar or palpebral conjunctival injection.  Ears: EAC's clear.  TM's clear.  No pre-auricular nodes.  Nose: Nasal Mucosa:  Pink.  Septum: No apparent deviation.  Turbinates:  No significant edema.  Polyps/Mass:  None visible.  Teeth/Gums:  No bleeding noted.  Oropharynx: No exudates.  Neck: Supple without thyromegaly. No cervical lymphadenopathy.    Respiratory/Chest: Effort: Good.  Auscultation:  Clear bilaterally.  Under meds a did the perfect  Skin: Good turgor.  No urticaria.  Mild swelling of right upper lip.  Neuro/Psych: Oriented x 3.    Pictures were reviewed on her cell phone on July 1, 2019 and are consistent with angioedema of the lips.    Laboratory 06/07/2016:  CBC:  Hemoglobin 10.6, hematocrit 34.9, microcytic indices.  CMP:  CO2 32.  Lipid panel:  Cholesterol 162.  Serum iron 66, transferrin P 55, TIBC 377, saturated iron 18.  Vitamin-D Level:  43.  TSH:  0.908.  Ferritin:  55.    Laboratory 7/16/2018:  CBC:  Hemoglobin 10.2, hematocrit 34.3, microcytic indices.  CMP:  CO2 30, albumin 3.4.  Iron 468, transferrin 297, TIBC 440, saturated iron 11.  Ferritin:  14.  TSH:  1.136.  Vitamin-D Level:  31.  Hemoglobin electrophoresis:  Consistent with beta thalassemia trait.    Laboratory 07/01/2019:  IgE level:  53.  ImmunoCAP:  Negative.  Crab, crawfish, lobster, shrimp, pineapple, strawberry, watermelon, melon:  Negative.  TSH:  0.831.  Thyroid peroxidase antibody level:  Less than 6.0.  Thyroglobulin antibody level:  Less than 4.0.  Serum tryptase:  3.5.  Serum iron:  117.  Transferrin:  294.  TIBC:  437.  Saturated iron:  27.  Serum ferritin:  32.  Vitamin-D Level:  40.  SPEP:  Normal.    Assessment:  1.  Chronic urticaria and angioedema probably idiopathic, doubt food allergy.  2.  Chronic rhinitis, consider allergic.  3.  GERD.  4.  Consider LPR.  5.  Hypertension on metoprolol.  6.  Chronic anemia, consider iron deficiency.  7.  Beta thalassemia trait.    Recommendations:  1.  Atarax as needed.  2.   Consider food skin testing in the future.  3.  Hold beta-blocker and antihistamines for skin testing in the future if possible.    Allergic mechanisms and treatment options were reviewed in detail.  Urticaria and angioedema were reviewed.  Handout was given.

## 2019-08-29 ENCOUNTER — OFFICE VISIT (OUTPATIENT)
Dept: FAMILY MEDICINE | Facility: CLINIC | Age: 59
End: 2019-08-29
Payer: COMMERCIAL

## 2019-08-29 VITALS
OXYGEN SATURATION: 98 % | SYSTOLIC BLOOD PRESSURE: 120 MMHG | TEMPERATURE: 98 F | HEIGHT: 64 IN | DIASTOLIC BLOOD PRESSURE: 68 MMHG | HEART RATE: 93 BPM | BODY MASS INDEX: 28.15 KG/M2 | WEIGHT: 164.88 LBS

## 2019-08-29 DIAGNOSIS — J30.9 CHRONIC ALLERGIC RHINITIS: ICD-10-CM

## 2019-08-29 DIAGNOSIS — I10 ESSENTIAL HYPERTENSION: ICD-10-CM

## 2019-08-29 DIAGNOSIS — M77.11 LATERAL EPICONDYLITIS OF RIGHT ELBOW: Primary | ICD-10-CM

## 2019-08-29 PROCEDURE — 99999 PR PBB SHADOW E&M-EST. PATIENT-LVL IV: CPT | Mod: PBBFAC,,, | Performed by: INTERNAL MEDICINE

## 2019-08-29 PROCEDURE — 3008F BODY MASS INDEX DOCD: CPT | Mod: CPTII,S$GLB,, | Performed by: INTERNAL MEDICINE

## 2019-08-29 PROCEDURE — 3008F PR BODY MASS INDEX (BMI) DOCUMENTED: ICD-10-PCS | Mod: CPTII,S$GLB,, | Performed by: INTERNAL MEDICINE

## 2019-08-29 PROCEDURE — 99214 PR OFFICE/OUTPT VISIT, EST, LEVL IV, 30-39 MIN: ICD-10-PCS | Mod: S$GLB,,, | Performed by: INTERNAL MEDICINE

## 2019-08-29 PROCEDURE — 3074F SYST BP LT 130 MM HG: CPT | Mod: CPTII,S$GLB,, | Performed by: INTERNAL MEDICINE

## 2019-08-29 PROCEDURE — 99214 OFFICE O/P EST MOD 30 MIN: CPT | Mod: S$GLB,,, | Performed by: INTERNAL MEDICINE

## 2019-08-29 PROCEDURE — 3074F PR MOST RECENT SYSTOLIC BLOOD PRESSURE < 130 MM HG: ICD-10-PCS | Mod: CPTII,S$GLB,, | Performed by: INTERNAL MEDICINE

## 2019-08-29 PROCEDURE — 99999 PR PBB SHADOW E&M-EST. PATIENT-LVL IV: ICD-10-PCS | Mod: PBBFAC,,, | Performed by: INTERNAL MEDICINE

## 2019-08-29 PROCEDURE — 3078F PR MOST RECENT DIASTOLIC BLOOD PRESSURE < 80 MM HG: ICD-10-PCS | Mod: CPTII,S$GLB,, | Performed by: INTERNAL MEDICINE

## 2019-08-29 PROCEDURE — 3078F DIAST BP <80 MM HG: CPT | Mod: CPTII,S$GLB,, | Performed by: INTERNAL MEDICINE

## 2019-08-29 RX ORDER — AZELASTINE 1 MG/ML
1 SPRAY, METERED NASAL 2 TIMES DAILY
Qty: 30 ML | Refills: 0 | Status: SHIPPED | OUTPATIENT
Start: 2019-08-29 | End: 2023-10-27

## 2019-08-29 RX ORDER — NAPROXEN 500 MG/1
500 TABLET ORAL 2 TIMES DAILY
Qty: 28 TABLET | Refills: 0 | Status: SHIPPED | OUTPATIENT
Start: 2019-08-29 | End: 2019-09-12

## 2019-08-29 NOTE — PROGRESS NOTES
Subjective:     Chief Complaint  Chief Complaint   Patient presents with    Arm Pain     right       HPI  Linh Clifton is a 59 y.o. female with medical diagnoses as listed in the medical history and problem list that presents for arm pain.  Last clinic visit was 7/22/2019.      Arm Pain    The incident occurred 3 to 5 days ago. The incident occurred at home. The pain is present in the right elbow. The quality of the pain is described as aching and cramping. The pain radiates to the right hand. The pain is severe. Associated symptoms include muscle weakness (R hand weakness), numbness and tingling. Pertinent negatives include no chest pain. She has tried NSAIDs (Advil) for the symptoms.         Patient Care Team:  Feliciano Chandler MD as PCP - General (Internal Medicine)      PAST MEDICAL HISTORY:  Past Medical History:   Diagnosis Date    Anemia 4/16/2015    Coronary artery disease     GERD (gastroesophageal reflux disease)     Hyperlipidemia     Hypertension     Microcytic anemia 4/16/2015    Positive PPD 4/16/2015    Vitamin D deficiency disease 4/16/2015       PAST SURGICAL HISTORY:  Past Surgical History:   Procedure Laterality Date    BREAST BIOPSY      BREAST CYST EXCISION      CATARACT EXTRACTION, BILATERAL Bilateral 02/2019    COLONOSCOPY N/A 6/15/2018    Performed [facility] at [facility]    HYSTERECTOMY      PARTIAL HYSTERECTOMY      over 21 years ago       SOCIAL HISTORY:  Social History     Socioeconomic History    Marital status:      Spouse name: Not on file    Number of children: Not on file    Years of education: Not on file    Highest education level: Not on file   Occupational History    Occupation: [employer]     Employer: [employer]   Social Needs    Financial resource strain: Not on file    Food insecurity:     Worry: Not on file     Inability: Not on file    Transportation needs:     Medical: Not on file     Non-medical: Not on file   Tobacco  Use    Smoking status: Never Smoker    Smokeless tobacco: Never Used   Substance and Sexual Activity    Alcohol use: Yes     Alcohol/week: 0.6 oz     Types: 1 Glasses of wine per week     Comment: occasional drinking    Drug use: No    Sexual activity: Not on file   Lifestyle    Physical activity:     Days per week: Not on file     Minutes per session: Not on file    Stress: Not on file   Relationships    Social connections:     Talks on phone: Not on file     Gets together: Not on file     Attends [employer] service: Not on file     Active member of club or organization: Not on file     Attends meetings of clubs or organizations: Not on file     Relationship status: Not on file   Other Topics Concern    Not on file   Social History Narrative    Not on file       FAMILY HISTORY:  Family History   Problem Relation Age of Onset    Heart disease Father     Hyperlipidemia Father     Hyperlipidemia Mother     Kidney disease Mother     Cancer Brother         pancreas    Heart disease Brother     Breast cancer Neg Hx     Colon cancer Neg Hx     Ovarian cancer Neg Hx        ALLERGIES AND MEDICATIONS: updated and reviewed.  Review of patient's allergies indicates:  No Known Allergies  Current Outpatient Medications   Medication Sig Dispense Refill    aspirin 81 MG Chew Take 81 mg by mouth once daily.        atorvastatin (LIPITOR) 20 MG tablet TAKE 1 TABLET BY MOUTH ONCE DAILY 90 tablet 12    ergocalciferol (VITAMIN D2) 50,000 unit Cap Take 1 capsule (50,000 Units total) by mouth every 7 days. 12 capsule 12    hydroCHLOROthiazide (HYDRODIURIL) 25 MG tablet Take 1 tablet (25 mg total) by mouth once daily. 90 tablet 12    metoprolol succinate (TOPROL-XL) 25 MG 24 hr tablet Take 1 tablet (25 mg total) by mouth once daily. 30 tablet 12    nystatin (MYCOSTATIN) cream Apply topically 2 (two) times daily. Apply to affected area BID 30 g 3    omeprazole (PRILOSEC) 20 MG capsule TAKE 1 CAPSULE BY MOUTH ONCE  "DAILY 30 capsule 12    acyclovir (ZOVIRAX) 400 MG tablet Take 1 tablet (400 mg total) by mouth 3 (three) times daily as needed. 30 tablet 11    chlorpheniramine (CHLOR-TRIMETON) 4 mg tablet Take 4 mg by mouth every 6 (six) hours as needed for Allergies.      fluticasone (FLONASE) 50 mcg/actuation nasal spray 2 sprays by Each Nare route once daily. 16 g 0    hydrocortisone 2.5 % cream Apply topically 2 (two) times daily. for 7 days 28 g 0    hydrOXYzine HCl (ATARAX) 25 MG tablet Take 25 mg by mouth.       No current facility-administered medications for this visit.          ROS:  Review of Systems   HENT: Positive for congestion.    Cardiovascular: Negative for chest pain.   Neurological: Positive for tingling and numbness.       Objective:       Physical Exam  Vitals:    08/29/19 0835   BP: 120/68   Pulse: 93   Temp: 98.3 °F (36.8 °C)   SpO2: 98%   Weight: 74.8 kg (164 lb 14.5 oz)   Height: 5' 4" (1.626 m)    Body mass index is 28.31 kg/m².  Weight: 74.8 kg (164 lb 14.5 oz)   Height: 5' 4" (162.6 cm)   Physical Exam   Constitutional: She appears well-developed. No distress.   HENT:   Head: Normocephalic and atraumatic.   Eyes: Conjunctivae and EOM are normal.   Neck: Normal range of motion.   Cardiovascular: Normal rate.   Pulmonary/Chest: Effort normal.   Musculoskeletal: Normal range of motion. She exhibits tenderness (R lateral epicondyle). She exhibits no edema or deformity.   Neurological: She is alert. No cranial nerve deficit or sensory deficit.   Mild weakness of R handgrip   Skin: Skin is warm and dry.   Psychiatric: She has a normal mood and affect. Her behavior is normal.   Vitals reviewed.          Assessment:     1. Lateral epicondylitis of right elbow    2. Chronic allergic rhinitis    3. Essential hypertension      Plan:     Linh was seen today for arm pain.    Diagnoses and all orders for this visit:    Lateral epicondylitis of right elbow  Start NSAID, utilize rest, ice, HEP recommended with " handout provided   Counseled to return in 1 week if symptoms unimproving  -     naproxen (NAPROSYN) 500 MG tablet; Take 1 tablet (500 mg total) by mouth 2 (two) times daily. for 14 days    Chronic allergic rhinitis  Discussed symptoms of allergic rhinitis and reviewed treatment modalities, including antihistamines, nasal steroids and advantages/limitations of OTC products (i.e., OTC decongestants, sinus rinse kits). Therapy as noted/per orders.   -     azelastine (ASTELIN) 137 mcg (0.1 %) nasal spray; 1 spray (137 mcg total) by Nasal route 2 (two) times daily.    Essential hypertension  BP controlled presently - reviewed anti-hypertensive regimen - continue current therapy          Health Maintenance       Date Due Completion Date    Hepatitis C Screening 1960 ---    Shingles Vaccine (1 of 2) 05/18/2008 ---    Influenza Vaccine (1) 09/01/2019 10/9/2018    Mammogram 02/18/2020 2/18/2019    Override on 11/6/2013: Done    Pap Smear 08/26/2020 8/26/2019    Aspirin/Antiplatelet Therapy 08/29/2020 8/29/2019    Lipid Panel 07/16/2023 7/16/2018    TETANUS VACCINE 10/17/2027 10/17/2017 (Done)    Override on 10/17/2017: Done    Colonoscopy 06/15/2028 6/15/2018    Override on 11/12/2008: Done            Health Maintenance reviewed and addressed as per orders    No follow-ups on file.    The patient expressed understanding and no barriers to adherence were identified.     1. The patient indicates understanding of these issues and agrees with the plan. Brief care plan is updated and reviewed with the patient as applicable.     2. The patient is given an After Visit Summary that lists all medications with directions, allergies, orders placed during this encounter and follow-up instructions.     3. I have reviewed the patient's medical information including past medical, family, and social history sections including the medications and allergies.     4. We discussed the patient's current medications. I reconciled the  patient's medication list and prepared and supplied needed refills.       Teodoro Abreu MD  Internal Medicine-Pediatrics

## 2019-08-29 NOTE — LETTER
August 29, 2019      [facility]  [address]  [place] [address]  Phone: [phone]  Fax: [phone]       Patient: Linh Clifton   YOB: 1960  Date of Visit: 08/29/2019    To Whom It May Concern:    Richie Clifton  was at [facility] on 08/29/2019. She is excused from work today. If you have any questions or concerns, or if I can be of further assistance, please do not hesitate to contact me.    Sincerely,    Teodoro Abreu MD

## 2019-08-29 NOTE — PATIENT INSTRUCTIONS
Tennis Elbow    Muscles connect to bones by thick, fibrous cords (tendons). When the muscles are overused by repeated motion, the tendons may become inflamed and painful. This condition is called tendonitis.  Tennis elbow (lateral epicondylitis) is a form of tendonitis. It occurs when the forearm muscles are used again and again in a twisting motion. Pain from tennis elbow occurs mainly on the outside of the elbow. But the pain can spread into the forearm and wrist. Your elbow may also be swollen and tender to the touch.  The pain may get worse when you move your arm or do simple activities. Bending your wrist back, shaking hands, or turning a doorknob may cause pain. The pain often gets worse after several weeks or months. Sometimes you may feel pain when your arm is still.  Tennis players who use a backhand stroke with poor technique are more likely to get tennis elbow. But playing tennis is only one cause of tennis elbow. Other common activities that can cause it include:  · Hammering  · Painting  · Raking  Besides tennis players, people at risk include , gardeners, musicians, and dentists. Sometimes people get tennis elbow without doing anything that would cause the injury.  Treatment includes resting the arm and taking anti-inflammatory medicines. Special splints help ease symptoms. Symptoms should get better after 4 to 6 weeks of rest. You may need steroid injections if resting and using a splint dont help. After the pain is relieved, you should change your activities so the symptoms dont return. You may need physical therapy. It may include stretching, range-of-motion, and strengthening exercises. These treatments help most cases. You may need surgery if your symptoms continue for 6 months.    Home care  Follow these guidelines when caring for yourself at home:  · Rest your elbow as needed. Protect it from movement that causes pain. You may be told to use a forearm splint at night to ease  symptoms in the morning. Your health care provider may recommend a special wrap or splint to compress the muscles of the forearm. This can ease pain during daytime activities. As your symptoms get better, start to move your elbow more.  · Put an ice pack on the injured area. Do this for 20 minutes every 1 to 2 hours the first day for pain relief. You can make an ice pack by wrapping a plastic bag of ice cubes in a thin towel. Continue using the ice pack 3 to 4 times a day for the next 2 days. Then use the ice pack as needed to ease pain and swelling.  · You may use acetaminophen or ibuprofen to control pain, unless another pain medicine was prescribed. If you have chronic liver or kidney disease, talk with your health care provider before using these medicines. Also talk with your provider if youve had a stomach ulcer or GI bleeding.  · After your elbow heals, avoid the motion that caused your pain. Or learn to move in a way that causes less stress on the tendon. Using a forearm wrap may keep tennis elbow from happening again.  · A tennis elbow strap may ease pain and keep you from further injury when you start playing tennis again. You can also lower your risk for injury by warming up before you play and cooling down afterward. You should also use the right equipment. For instance, make sure your racquet has the right  and is the right size for you.  ·   Follow-up care  Follow up with your health care provider, or as advised, if your symptoms dont get better after 1 week of treatment.    When to seek medical advice  Call your health care provider right away if any of these occur:  · Redness over the painful area  · Pain or swelling at the elbow gets worse  · Any numbness or tingling in your arm, hands, or fingers  · Unexplained fever over 101ºF (37.8ºC)   Date Last Reviewed: 2/17/2015  © 7120-7829 [employer]. [address] [place] [address], [place] [address]. All rights reserved. This information is  "not intended as a substitute for professional medical care. Always follow your healthcare professional's instructions.      HOW TO USE NEILMED SINUS RINSE TO IRRIGATE/CLEAN YOUR SINUSES      Obtain a Neilmed Sinus Rinse kit. You can get the kit from your local pharmacy or [employer]'s website. [employer] offers three types of kits:   The Sinus Rinse Starter Kit includes an 8-ounce (240ml) squeeze bottle and 5 packets of premixed rinse solution.   The Sinus Rinse Complete Kit includes an 8-ounce (240 ml) squeeze bottle and 50 packets of premixed rinse solution.   The Sinus Rinse Kids Starter Kit includes a 4-ounce (120ml) squeeze bottle and 30 packets of premixed rinse solution, specially formulated for children.  Wash your hands to avoid contaminating the product. The CDC recommends that you use warm water and soap. Scrub your hands for about 20 seconds, or about the amount of time it takes to sing the "Happy Birthday" song twice.  Warm up distilled or previously boiled water until it is slightly warm. You can warm water up on the stove or in the microwave in a clean safe container. You should warm the water for 5 seconds at a time if using a microwave. It should be at body-temperature, or "lukewarm."   Do not use water that is not micro-filtered, boiled, or distilled to rinse your sinuses. Tap water may contain microorganisms that could cause illness. Fill the bottle with the designated amount of water. The correct amount of water should be 8 oz. (240 ml). Your water line should be at the dotted fill line of the bottle. If you are using a Kids Sinus Rinse kit, you will use 4 oz. (120 ml) of water  Pour the contents into the bottle and tighten the cap. Make sure you screw the cap on tightly so it doesn't fall off in the next step  Place one finger over the tip and shake the bottle gently. This will allow the saline mixture to dissolve into the waterPut the nozzle tip snugly against one of your nostrils. Keep your mouth " open, because the mixture can drain from your mouth as well as the opposite nostril. This also reduces pressure on the earsSqueeze the bottle gently to force the liquid into your nasal passages. Squeeze until the solution begins to drain from the opposite nostrilSqueeze the bottle until 1/4-to-1/2 (60-to-120 ml) is used in one nostril. You can use up to half the solution per nostril, but you should always use at least one-quarter of the solution for each. Blow your nose without pinching it completely shut. Pinching your nose entirely shut would put too much pressure on your eardrums. Then, try sniffing in the remaining solution to help clear out the nasopharyngeal area (at the back of your nasal passages).   Tilt your head to the opposite side to expel any remaining solution from your sinuses or nasal passage.   Spit out any solution that reaches the back of your throat.  Repeat the last 5 steps for the other nostril  Discard the tiny amount of solution left over. Never store leftover solution. It can breed bacteria  Disinfect the sinus rinse bottle. Rinse the cap, tube, and rinse bottle with water. Then, add a drop of dishwashing detergent to the bottle and fill it with water. Put the cap on and shake the bottle well. Squeeze the soapy water through the cap. Use a bottle brush to scrub the bottle, cap, and tube. Rinse thoroughly with clean water. Air dry the bottle and nozzle on a clean towel or glass plate    Use coricidn HBP instead of SUDAFED or other over-the-counter decongestants - safe for patients with high blood pressure

## 2019-09-06 ENCOUNTER — OFFICE VISIT (OUTPATIENT)
Dept: ALLERGY | Facility: CLINIC | Age: 59
End: 2019-09-06
Payer: COMMERCIAL

## 2019-09-06 VITALS
BODY MASS INDEX: 28.68 KG/M2 | WEIGHT: 168 LBS | DIASTOLIC BLOOD PRESSURE: 76 MMHG | HEIGHT: 64 IN | SYSTOLIC BLOOD PRESSURE: 136 MMHG

## 2019-09-06 DIAGNOSIS — L50.1 IDIOPATHIC URTICARIA: ICD-10-CM

## 2019-09-06 DIAGNOSIS — J31.0 CHRONIC RHINITIS: ICD-10-CM

## 2019-09-06 DIAGNOSIS — I10 ESSENTIAL HYPERTENSION: Chronic | ICD-10-CM

## 2019-09-06 DIAGNOSIS — R76.11 POSITIVE PPD: ICD-10-CM

## 2019-09-06 DIAGNOSIS — T78.3XXD ANGIOEDEMA, SUBSEQUENT ENCOUNTER: Primary | ICD-10-CM

## 2019-09-06 DIAGNOSIS — D50.9 MICROCYTIC ANEMIA: ICD-10-CM

## 2019-09-06 PROCEDURE — 99214 OFFICE O/P EST MOD 30 MIN: CPT | Mod: 25,S$GLB,, | Performed by: ALLERGY & IMMUNOLOGY

## 2019-09-06 PROCEDURE — 95004 PERQ TESTS W/ALRGNC XTRCS: CPT | Mod: S$GLB,,, | Performed by: ALLERGY & IMMUNOLOGY

## 2019-09-06 PROCEDURE — 3078F DIAST BP <80 MM HG: CPT | Mod: CPTII,S$GLB,, | Performed by: ALLERGY & IMMUNOLOGY

## 2019-09-06 PROCEDURE — 99999 PR PBB SHADOW E&M-EST. PATIENT-LVL III: CPT | Mod: PBBFAC,,, | Performed by: ALLERGY & IMMUNOLOGY

## 2019-09-06 PROCEDURE — 3075F SYST BP GE 130 - 139MM HG: CPT | Mod: CPTII,S$GLB,, | Performed by: ALLERGY & IMMUNOLOGY

## 2019-09-06 PROCEDURE — 95004 PR ALLERGY SKIN TESTS,ALLERGENS: ICD-10-PCS | Mod: S$GLB,,, | Performed by: ALLERGY & IMMUNOLOGY

## 2019-09-06 PROCEDURE — 99999 PR PBB SHADOW E&M-EST. PATIENT-LVL III: ICD-10-PCS | Mod: PBBFAC,,, | Performed by: ALLERGY & IMMUNOLOGY

## 2019-09-06 PROCEDURE — 3075F PR MOST RECENT SYSTOLIC BLOOD PRESS GE 130-139MM HG: ICD-10-PCS | Mod: CPTII,S$GLB,, | Performed by: ALLERGY & IMMUNOLOGY

## 2019-09-06 PROCEDURE — 3078F PR MOST RECENT DIASTOLIC BLOOD PRESSURE < 80 MM HG: ICD-10-PCS | Mod: CPTII,S$GLB,, | Performed by: ALLERGY & IMMUNOLOGY

## 2019-09-06 PROCEDURE — 99214 PR OFFICE/OUTPT VISIT, EST, LEVL IV, 30-39 MIN: ICD-10-PCS | Mod: 25,S$GLB,, | Performed by: ALLERGY & IMMUNOLOGY

## 2019-09-06 NOTE — PROGRESS NOTES
Linh Clifton returns to clinic today for continued evaluation of urticaria and angioedema.  She is here alone.  She works as a medical rehab technician.    She has not had any further urticaria.    This past Monday she had angioedema of the lip.  She woke up around 6:00 a.m. and continued to have swelling until the following day.  She did not take any antihistamines as she was scheduled for food skin testing today.    The day before she had eaten roast beef and rice.    She has eating catfish without any difficulty.  She has also been eating chicken.    She continues to be concerned about a food allergy.    She has not had any rhinitis or conjunctivitis.  She denies any cough, wheezing, or shortness of breath.    She has not been taking antihistamines regularly.    She takes metoprolol for her hypertension and was able to stop this for two days.    [facility] ALLERGY QUESTIONNAIRE SHORT 8/14/2019   Are you taking any new medications since your last visit? Yes   Constitution: No symptoms   Head or facial pain: No symptoms   Eyes: No symptoms   Ears: Fullness, Pressure   Nose: Post nasal drip, Sniffling   Throat: Frequent clearing   Sinuses: No symptoms   Lungs: No symptoms   Skin: Swelling   Cardiovascular: No symptoms   Gastrointestinal: No symptoms   Genital/ urinary No symptoms   Musculoskeletal: Muscle pain, Joint pain, Joint swelling   Neurologic: No symptoms   Endocrine: No symptoms   Hematologic: No symptoms     General: Well-developed, well-nourished, no acute distress.  Head: No sinus tenderness.  Eyes: Conjunctivae:  No bulbar or palpebral conjunctival injection.  Ears: EAC's clear.  TM's clear.  No pre-auricular nodes.  Nose: Nasal Mucosa:  Pink.  Septum: No apparent deviation.  Turbinates:  No significant edema.  Polyps/Mass:  None visible.  Teeth/Gums:  No bleeding noted.  Oropharynx: No exudates.  Neck: Supple without thyromegaly. No cervical lymphadenopathy.    Respiratory/Chest: Effort: Good.   Auscultation:  Clear bilaterally.    Skin: Good turgor.  No urticaria or angioedema.  Neuro/Psych: Oriented x 3.    Pictures were reviewed on her cell phone on July 1, 2019 and are consistent with angioedema of the lips.    Laboratory 06/07/2016:  CBC:  Hemoglobin 10.6, hematocrit 34.9, microcytic indices.  CMP:  CO2 32.  Lipid panel:  Cholesterol 162.  Serum iron 66, transferrin P 55, TIBC 377, saturated iron 18.  Vitamin-D Level:  43.  TSH:  0.908.  Ferritin:  55.    Laboratory 7/16/2018:  CBC:  Hemoglobin 10.2, hematocrit 34.3, microcytic indices.  CMP:  CO2 30, albumin 3.4.  Iron 468, transferrin 297, TIBC 440, saturated iron 11.  Ferritin:  14.  TSH:  1.136.  Vitamin-D Level:  31.  Hemoglobin electrophoresis:  Consistent with beta thalassemia trait.    Laboratory 07/01/2019:  IgE level:  53.  ImmunoCAP:  Negative.  Crab, crawfish, lobster, shrimp, pineapple, strawberry, watermelon, melon:  Negative.  TSH:  0.831.  Thyroid peroxidase antibody level:  Less than 6.0.  Thyroglobulin antibody level:  Less than 4.0.  Serum tryptase:  3.5.  Serum iron:  117.  Transferrin:  294.  TIBC:  437.  Saturated iron:  27.  Serum ferritin:  32.  Vitamin-D Level:  40.  SPEP:  Normal.    Food skin test 9/06/2019:  3+ histamine control.  All tests were negative.    Assessment:  1.  Chronic urticaria and angioedema probably idiopathic, no evidence of food allergy.  2.  Chronic rhinitis, doubt allergic.  3.  GERD.  4.  Consider LPR.  5.  Hypertension on metoprolol.  6.  Chronic anemia, consider iron deficiency.  7.  Beta thalassemia trait.    Recommendations:  1.  Start taking cetirizine 10 milligrams a day.  May take up to four day if needed.  2.  Add Atarax if not controlled on cetirizine 40 milligrams a day.  3.  Allergic mechanisms again reviewed.  Handout was given.  4.  Return to clinic in three months or sooner if needed.    Patient education:  August 14, 2019:  Allergic mechanisms and treatment options were reviewed in  detail.  Urticaria and angioedema were reviewed.  Handout was given.

## 2019-10-01 ENCOUNTER — OFFICE VISIT (OUTPATIENT)
Dept: FAMILY MEDICINE | Facility: CLINIC | Age: 59
End: 2019-10-01
Payer: COMMERCIAL

## 2019-10-01 VITALS
OXYGEN SATURATION: 98 % | WEIGHT: 168.63 LBS | SYSTOLIC BLOOD PRESSURE: 138 MMHG | BODY MASS INDEX: 28.79 KG/M2 | HEIGHT: 64 IN | TEMPERATURE: 98 F | HEART RATE: 86 BPM | DIASTOLIC BLOOD PRESSURE: 62 MMHG

## 2019-10-01 DIAGNOSIS — I10 ESSENTIAL HYPERTENSION: ICD-10-CM

## 2019-10-01 DIAGNOSIS — I25.10 CORONARY ARTERY DISEASE INVOLVING NATIVE CORONARY ARTERY OF NATIVE HEART WITHOUT ANGINA PECTORIS: ICD-10-CM

## 2019-10-01 DIAGNOSIS — M75.42 ROTATOR CUFF IMPINGEMENT SYNDROME OF LEFT SHOULDER: Primary | ICD-10-CM

## 2019-10-01 PROCEDURE — 3075F PR MOST RECENT SYSTOLIC BLOOD PRESS GE 130-139MM HG: ICD-10-PCS | Mod: CPTII,S$GLB,, | Performed by: INTERNAL MEDICINE

## 2019-10-01 PROCEDURE — 99999 PR PBB SHADOW E&M-EST. PATIENT-LVL III: CPT | Mod: PBBFAC,,, | Performed by: INTERNAL MEDICINE

## 2019-10-01 PROCEDURE — 99214 OFFICE O/P EST MOD 30 MIN: CPT | Mod: S$GLB,,, | Performed by: INTERNAL MEDICINE

## 2019-10-01 PROCEDURE — 3078F PR MOST RECENT DIASTOLIC BLOOD PRESSURE < 80 MM HG: ICD-10-PCS | Mod: CPTII,S$GLB,, | Performed by: INTERNAL MEDICINE

## 2019-10-01 PROCEDURE — 3078F DIAST BP <80 MM HG: CPT | Mod: CPTII,S$GLB,, | Performed by: INTERNAL MEDICINE

## 2019-10-01 PROCEDURE — 3075F SYST BP GE 130 - 139MM HG: CPT | Mod: CPTII,S$GLB,, | Performed by: INTERNAL MEDICINE

## 2019-10-01 PROCEDURE — 3008F PR BODY MASS INDEX (BMI) DOCUMENTED: ICD-10-PCS | Mod: CPTII,S$GLB,, | Performed by: INTERNAL MEDICINE

## 2019-10-01 PROCEDURE — 99999 PR PBB SHADOW E&M-EST. PATIENT-LVL III: ICD-10-PCS | Mod: PBBFAC,,, | Performed by: INTERNAL MEDICINE

## 2019-10-01 PROCEDURE — 99214 PR OFFICE/OUTPT VISIT, EST, LEVL IV, 30-39 MIN: ICD-10-PCS | Mod: S$GLB,,, | Performed by: INTERNAL MEDICINE

## 2019-10-01 PROCEDURE — 3008F BODY MASS INDEX DOCD: CPT | Mod: CPTII,S$GLB,, | Performed by: INTERNAL MEDICINE

## 2019-10-01 RX ORDER — CETIRIZINE HYDROCHLORIDE 10 MG/1
10 TABLET ORAL DAILY
COMMUNITY

## 2019-10-01 NOTE — PATIENT INSTRUCTIONS
Nonsurgical Treatment Options for Shoulder Impingement      Rest is key to healing your shoulder. If an activity hurts, dont do it. Otherwise, you may prevent healing and increase pain. Your shoulder needs active rest. This means avoiding overhead movements and activities that cause pain. But DO NOT stop using your shoulder completely. This can cause it to stiffen or freeze. In addition to rest, impingement can be treated a number of ways. Your healthcare provider can help you find which of these is best for you.  A physical therapist can also help you with exercises specific for your condition.    ? Ice  Ice reduces inflammation and relieves pain. Apply an ice pack for about 15 minutes, 3 times a day. You can also use a bag of frozen peas instead of an ice pack. A pillow placed under your arm may help make you more comfortable.  Note: Dont put the cold item directly on your skin. Place it on top of your shirt, or wrap it in a thin towel or washcloth.  ? Heat  Heat may soothe aching muscles, but it wont reduce inflammation. Use a heating pad or take a warm shower or bath. Do this for 15 minutes at a time.  Note: Avoid heat when pain is constant. Heat is best when used for warming up before an activity. You can also alternate ice and heat.  ? Medicine  To relieve pain and inflammation, try over-the-counter pain relievers, such as acetaminophen or ibuprofen. Or, your healthcare provider may prescribe medicines. Ask how and when to take your medicine. Be sure to follow all instructions youre given.    ? Electrical stimulation  Electrical stimulation can help reduce pain and swelling. Your healthcare provider attaches small pads to your shoulder. A mild electric current then flows into your shoulder. You may feel tingling, but you should not feel pain.    ? Ultrasound  Ultrasound can help reduce pain. First a slick gel or medicated cream is applied to your shoulder. Then your healthcare provider places a small  device over the area. The device uses sound waves to loosen shoulder tightness. This treatment should be pain-free.    ? Injection therapy  Injection therapy may be used to help diagnose your problem. It may also be used to reduce pain and inflammation. The injection typically includes two medicines. One is an anesthetic to numb the shoulder. The other is a steroid, such as cortisone, to help reduce painful swelling. It can take from a few hours to a couple of days before the injection helps. Talk to your healthcare provider about the possible risks and benefits of this therapy.    Date Last Reviewed: 10/14/2015  © 0255-7441 [email or web address]. [address] [place] [address], [place] [address]. All rights reserved. This information is not intended as a substitute for professional medical care. Always follow your healthcare professional's instructions.

## 2019-11-10 ENCOUNTER — PATIENT OUTREACH (OUTPATIENT)
Dept: ADMINISTRATIVE | Facility: OTHER | Age: 59
End: 2019-11-10

## 2019-11-22 ENCOUNTER — TELEPHONE (OUTPATIENT)
Dept: FAMILY MEDICINE | Facility: CLINIC | Age: 59
End: 2019-11-22

## 2019-11-22 ENCOUNTER — OFFICE VISIT (OUTPATIENT)
Dept: FAMILY MEDICINE | Facility: CLINIC | Age: 59
End: 2019-11-22
Payer: COMMERCIAL

## 2019-11-22 VITALS
WEIGHT: 168.63 LBS | SYSTOLIC BLOOD PRESSURE: 138 MMHG | BODY MASS INDEX: 31.03 KG/M2 | TEMPERATURE: 98 F | HEIGHT: 62 IN | HEART RATE: 84 BPM | DIASTOLIC BLOOD PRESSURE: 64 MMHG | OXYGEN SATURATION: 97 %

## 2019-11-22 DIAGNOSIS — Z20.828 EXPOSURE TO INFLUENZA: ICD-10-CM

## 2019-11-22 DIAGNOSIS — R68.89 FLU-LIKE SYMPTOMS: Primary | ICD-10-CM

## 2019-11-22 PROCEDURE — 3075F PR MOST RECENT SYSTOLIC BLOOD PRESS GE 130-139MM HG: ICD-10-PCS | Mod: CPTII,S$GLB,, | Performed by: PHYSICIAN ASSISTANT

## 2019-11-22 PROCEDURE — 3008F PR BODY MASS INDEX (BMI) DOCUMENTED: ICD-10-PCS | Mod: CPTII,S$GLB,, | Performed by: PHYSICIAN ASSISTANT

## 2019-11-22 PROCEDURE — 99999 PR PBB SHADOW E&M-EST. PATIENT-LVL IV: CPT | Mod: PBBFAC,,, | Performed by: PHYSICIAN ASSISTANT

## 2019-11-22 PROCEDURE — 99214 OFFICE O/P EST MOD 30 MIN: CPT | Mod: S$GLB,,, | Performed by: PHYSICIAN ASSISTANT

## 2019-11-22 PROCEDURE — 3075F SYST BP GE 130 - 139MM HG: CPT | Mod: CPTII,S$GLB,, | Performed by: PHYSICIAN ASSISTANT

## 2019-11-22 PROCEDURE — 99214 PR OFFICE/OUTPT VISIT, EST, LEVL IV, 30-39 MIN: ICD-10-PCS | Mod: S$GLB,,, | Performed by: PHYSICIAN ASSISTANT

## 2019-11-22 PROCEDURE — 3078F DIAST BP <80 MM HG: CPT | Mod: CPTII,S$GLB,, | Performed by: PHYSICIAN ASSISTANT

## 2019-11-22 PROCEDURE — 3008F BODY MASS INDEX DOCD: CPT | Mod: CPTII,S$GLB,, | Performed by: PHYSICIAN ASSISTANT

## 2019-11-22 PROCEDURE — 99999 PR PBB SHADOW E&M-EST. PATIENT-LVL IV: ICD-10-PCS | Mod: PBBFAC,,, | Performed by: PHYSICIAN ASSISTANT

## 2019-11-22 PROCEDURE — 3078F PR MOST RECENT DIASTOLIC BLOOD PRESSURE < 80 MM HG: ICD-10-PCS | Mod: CPTII,S$GLB,, | Performed by: PHYSICIAN ASSISTANT

## 2019-11-22 RX ORDER — OSELTAMIVIR PHOSPHATE 75 MG/1
75 CAPSULE ORAL 2 TIMES DAILY
Qty: 10 CAPSULE | Refills: 0 | Status: SHIPPED | OUTPATIENT
Start: 2019-11-22 | End: 2019-11-27

## 2019-11-22 RX ORDER — PROMETHAZINE HYDROCHLORIDE AND DEXTROMETHORPHAN HYDROBROMIDE 6.25; 15 MG/5ML; MG/5ML
5 SYRUP ORAL
Qty: 118 ML | Refills: 0 | Status: SHIPPED | OUTPATIENT
Start: 2019-11-22 | End: 2019-12-02

## 2019-11-22 NOTE — PATIENT INSTRUCTIONS

## 2019-11-22 NOTE — TELEPHONE ENCOUNTER
----- Message from Le Nick sent at 11/22/2019  3:20 PM CST -----  Contact: pt  Type: Patient Call Back    Who called:pt    What is the request in detail:pt is requesting doctor's note to be faxed to her job. Fax no [phone]    Can the clinic reply by [facility]?    Would the patient rather a call back or a response via [facility]? call    Best call back number:[phone]    Additional Information:

## 2019-11-22 NOTE — TELEPHONE ENCOUNTER
Returned Patient's call to inform her that note was faxed.  No answer.  Left voice message requesting a call back.

## 2019-11-22 NOTE — PROGRESS NOTES
Patient Name: Linh Clifton    : 1960  MRN: [id number]    Subjective:  Linh is a 59 y.o. female who presents today for:    Chief Complaint   Patient presents with    Cough    Fever    Generalized Body Aches       HPI  Patient has multiple medical diagnoses as listed below in the history. Patient is new to me, but known to the clinic. She complains of flu-like symptoms. She reports fever last night of 101. Controlled with tylenol. Last does of tylenol was 10am this morning. She is currently afebrile. She reports her grandchild to have positive influenza A and B last week. She lives with granddaughter. She has associated non productive cough. Symptoms are worse in early morning. Associated rhinorrhea and post nasal drip. She reports body aches that began today. She denies abdominal symptoms.     Past Medical History  Past Medical History:   Diagnosis Date    Anemia 2015    Coronary artery disease     GERD (gastroesophageal reflux disease)     Hyperlipidemia     Hypertension     Microcytic anemia 2015    Positive PPD 2015    Vitamin D deficiency disease 2015       Past Surgical History  Past Surgical History:   Procedure Laterality Date    BREAST BIOPSY      BREAST CYST EXCISION      CATARACT EXTRACTION, BILATERAL Bilateral 2019    COLONOSCOPY N/A 6/15/2018    Procedure: COLONOSCOPY;  Surgeon: Mikael Wilde MD;  Location[facility];  Service: Endoscopy;  Laterality: N/A;  confirmed-ss    HYSTERECTOMY      PARTIAL HYSTERECTOMY      over 21 years ago       Family History  Family History   Problem Relation Age of Onset    Heart disease Father     Hyperlipidemia Father     Hyperlipidemia Mother     Kidney disease Mother     Cancer Brother         pancreas    Heart disease Brother     Breast cancer Neg Hx     Colon cancer Neg Hx     Ovarian cancer Neg Hx        Social History  Social History     Socioeconomic History    Marital status:      Spouse name: Not on  file    Number of children: Not on file    Years of education: Not on file    Highest education level: Not on file   Occupational History    Occupation: PA asst     Employer: [employer]   Social Needs    Financial resource strain: Not on file    Food insecurity:     Worry: Not on file     Inability: Not on file    Transportation needs:     Medical: Not on file     Non-medical: Not on file   Tobacco Use    Smoking status: Never Smoker    Smokeless tobacco: Never Used   Substance and Sexual Activity    Alcohol use: Yes     Alcohol/week: 1.0 standard drinks     Types: 1 Glasses of wine per week     Comment: occasional drinking    Drug use: No    Sexual activity: Not on file   Lifestyle    Physical activity:     Days per week: Not on file     Minutes per session: Not on file    Stress: Not on file   Relationships    Social connections:     Talks on phone: Not on file     Gets together: Not on file     Attends Rastafari service: Not on file     Active member of club or organization: Not on file     Attends meetings of clubs or organizations: Not on file     Relationship status: Not on file   Other Topics Concern    Not on file   Social History Narrative    Not on file       Current Medications  Current Outpatient Medications on File Prior to Visit   Medication Sig Dispense Refill    aspirin 81 MG Chew Take 81 mg by mouth once daily.        atorvastatin (LIPITOR) 20 MG tablet TAKE 1 TABLET BY MOUTH ONCE DAILY 90 tablet 12    azelastine (ASTELIN) 137 mcg (0.1 %) nasal spray 1 spray (137 mcg total) by Nasal route 2 (two) times daily. 30 mL 0    cetirizine (ZYRTEC) 10 MG tablet Take 10 mg by mouth once daily.      chlorpheniramine (CHLOR-TRIMETON) 4 mg tablet Take 4 mg by mouth every 6 (six) hours as needed for Allergies.      ergocalciferol (VITAMIN D2) 50,000 unit Cap Take 1 capsule (50,000 Units total) by mouth every 7 days. 12 capsule 12    fluticasone (FLONASE) 50 mcg/actuation nasal  "spray 2 sprays by Each Nare route once daily. 16 g 0    hydroCHLOROthiazide (HYDRODIURIL) 25 MG tablet Take 1 tablet (25 mg total) by mouth once daily. 90 tablet 12    hydrOXYzine HCl (ATARAX) 25 MG tablet Take 25 mg by mouth.      metoprolol succinate (TOPROL-XL) 25 MG 24 hr tablet Take 1 tablet (25 mg total) by mouth once daily. 30 tablet 12    nystatin (MYCOSTATIN) cream Apply topically 2 (two) times daily. Apply to affected area BID 30 g 3    omeprazole (PRILOSEC) 20 MG capsule TAKE 1 CAPSULE BY MOUTH ONCE DAILY 30 capsule 12    acyclovir (ZOVIRAX) 400 MG tablet Take 1 tablet (400 mg total) by mouth 3 (three) times daily as needed. 30 tablet 11    hydrocortisone 2.5 % cream Apply topically 2 (two) times daily. for 7 days 28 g 0     No current facility-administered medications on file prior to visit.        Allergies   Review of patient's allergies indicates:  No Known Allergies      ROS  Review of Systems   Constitutional: Positive for chills and fever. Negative for fatigue.   HENT: Positive for congestion, postnasal drip, rhinorrhea and sore throat. Negative for ear pain, sinus pressure, sinus pain and sneezing.    Respiratory: Positive for cough. Negative for shortness of breath and wheezing.    Cardiovascular: Negative for chest pain and palpitations.   Gastrointestinal: Negative for abdominal pain and nausea.   Musculoskeletal: Positive for myalgias.   Skin: Negative for rash.   Neurological: Positive for headaches. Negative for light-headedness.   Hematological: Negative for adenopathy.         Objective:    /64 (BP Location: Left arm, Patient Position: Sitting, BP Method: Medium (Manual))   Pulse 84   Temp 98.1 °F (36.7 °C) (Oral)   Ht 5' 2" (1.575 m)   Wt 76.5 kg (168 lb 10.4 oz)   SpO2 97%   BMI 30.85 kg/m²     Physical Exam   Constitutional: Vital signs are normal.   HENT:   Head: Normocephalic.   Nose: Rhinorrhea present. Right sinus exhibits no maxillary sinus tenderness and no " frontal sinus tenderness. Left sinus exhibits no maxillary sinus tenderness and no frontal sinus tenderness.   Mouth/Throat: Uvula is midline and mucous membranes are normal. Posterior oropharyngeal erythema present. No oropharyngeal exudate or posterior oropharyngeal edema.   Eyes: Conjunctivae, EOM and lids are normal.   Cardiovascular: Normal rate, regular rhythm, S1 normal and S2 normal.   Pulmonary/Chest: Effort normal and breath sounds normal. She has no wheezes.   Lymphadenopathy:     She has no cervical adenopathy.        Right: No supraclavicular adenopathy present.        Left: No supraclavicular adenopathy present.   Neurological: She is alert.   Skin: Skin is warm, dry and intact. No rash noted.   Psychiatric: She has a normal mood and affect. Judgment normal.       Assessment/Plan:  Linh Clifton is a 59 y.o. female who presents today for :    Linh was seen today for cough, fever and generalized body aches.    Diagnoses and all orders for this visit:    Flu-like symptoms  -     oseltamivir (TAMIFLU) 75 MG capsule; Take 1 capsule (75 mg total) by mouth 2 (two) times daily. for 5 days  -     promethazine-dextromethorphan (PROMETHAZINE-DM) 6.25-15 mg/5 mL Syrp; Take 5 mLs by mouth every 4 to 6 hours as needed.    Exposure to influenza  -     oseltamivir (TAMIFLU) 75 MG capsule; Take 1 capsule (75 mg total) by mouth 2 (two) times daily. for 5 days    Will treat based on exposure and presenting symptoms. Patient is currently afebrile with recent tylenol dose. Lungs are clear to auscultation bilaterally.  Home care  Follow these guidelines when caring for yourself at home:  · Avoid being around cigarette smoke, whether yours or other peoples.  · Acetaminophen or ibuprofen will help ease your fever, muscle aches, and headache. Dont give aspirin to anyone younger than 18 who has the flu. Aspirin can harm the liver.  · Nausea and loss of appetite are common with the flu. Eat light meals. Drink 6 to 8  glasses of liquids every day. Good choices are water, sport drinks, soft drinks without caffeine, juices, tea, and soup. Extra fluids will also help loosen secretions in your nose and lungs.  · Over-the-counter cold medicines will not make the flu go away faster. But the medicines may help with coughing, sore throat, and congestion in your nose and sinuses. Dont use a decongestant if you have high blood pressure.  · Stay home until your fever has been gone for at least 24 hours without using medicine to reduce fever.      Counseled patient on the clinical course of condition including symptomatology, treatment and prevention.  Counseled regarding risks, benefits, and limitations of medications.  Advised patient to seek urgent/emergent care if symptoms intensify.  Sent patient with informational material about diagnosis.  Patient gave verbal understanding and agreement of plan.      Health maintenance reviewed and discussed, deferred at this time due to acute illness         Encouraged to call/return to clinic if symptoms persist or worsen    Lynette Castle PA-C  [facility]/ [facility]

## 2019-12-04 ENCOUNTER — PATIENT OUTREACH (OUTPATIENT)
Dept: ADMINISTRATIVE | Facility: OTHER | Age: 59
End: 2019-12-04

## 2020-02-14 DIAGNOSIS — I10 HYPERTENSION: ICD-10-CM

## 2020-02-21 ENCOUNTER — OFFICE VISIT (OUTPATIENT)
Dept: FAMILY MEDICINE | Facility: CLINIC | Age: 60
End: 2020-02-21
Payer: COMMERCIAL

## 2020-02-21 VITALS
DIASTOLIC BLOOD PRESSURE: 64 MMHG | HEART RATE: 79 BPM | WEIGHT: 171.5 LBS | OXYGEN SATURATION: 97 % | BODY MASS INDEX: 31.37 KG/M2 | SYSTOLIC BLOOD PRESSURE: 136 MMHG | TEMPERATURE: 99 F

## 2020-02-21 DIAGNOSIS — K21.9 GASTROESOPHAGEAL REFLUX DISEASE, ESOPHAGITIS PRESENCE NOT SPECIFIED: ICD-10-CM

## 2020-02-21 DIAGNOSIS — D50.9 MICROCYTIC ANEMIA: ICD-10-CM

## 2020-02-21 DIAGNOSIS — Z00.00 ROUTINE MEDICAL EXAM: Primary | ICD-10-CM

## 2020-02-21 DIAGNOSIS — I25.10 CORONARY ARTERY DISEASE INVOLVING NATIVE CORONARY ARTERY OF NATIVE HEART WITHOUT ANGINA PECTORIS: ICD-10-CM

## 2020-02-21 DIAGNOSIS — T78.3XXD ANGIOEDEMA, SUBSEQUENT ENCOUNTER: ICD-10-CM

## 2020-02-21 DIAGNOSIS — E55.9 VITAMIN D DEFICIENCY DISEASE: ICD-10-CM

## 2020-02-21 DIAGNOSIS — J30.9 CHRONIC ALLERGIC RHINITIS: ICD-10-CM

## 2020-02-21 DIAGNOSIS — I10 ESSENTIAL HYPERTENSION: ICD-10-CM

## 2020-02-21 DIAGNOSIS — Z12.31 ENCOUNTER FOR SCREENING MAMMOGRAM FOR BREAST CANCER: ICD-10-CM

## 2020-02-21 DIAGNOSIS — E78.5 HYPERLIPIDEMIA, UNSPECIFIED HYPERLIPIDEMIA TYPE: ICD-10-CM

## 2020-02-21 PROCEDURE — 3078F PR MOST RECENT DIASTOLIC BLOOD PRESSURE < 80 MM HG: ICD-10-PCS | Mod: CPTII,S$GLB,, | Performed by: INTERNAL MEDICINE

## 2020-02-21 PROCEDURE — 3078F DIAST BP <80 MM HG: CPT | Mod: CPTII,S$GLB,, | Performed by: INTERNAL MEDICINE

## 2020-02-21 PROCEDURE — 3075F SYST BP GE 130 - 139MM HG: CPT | Mod: CPTII,S$GLB,, | Performed by: INTERNAL MEDICINE

## 2020-02-21 PROCEDURE — 99999 PR PBB SHADOW E&M-EST. PATIENT-LVL IV: CPT | Mod: PBBFAC,,, | Performed by: INTERNAL MEDICINE

## 2020-02-21 PROCEDURE — 99999 PR PBB SHADOW E&M-EST. PATIENT-LVL IV: ICD-10-PCS | Mod: PBBFAC,,, | Performed by: INTERNAL MEDICINE

## 2020-02-21 PROCEDURE — 3075F PR MOST RECENT SYSTOLIC BLOOD PRESS GE 130-139MM HG: ICD-10-PCS | Mod: CPTII,S$GLB,, | Performed by: INTERNAL MEDICINE

## 2020-02-21 PROCEDURE — 99396 PR PREVENTIVE VISIT,EST,40-64: ICD-10-PCS | Mod: S$GLB,,, | Performed by: INTERNAL MEDICINE

## 2020-02-21 PROCEDURE — 99396 PREV VISIT EST AGE 40-64: CPT | Mod: S$GLB,,, | Performed by: INTERNAL MEDICINE

## 2020-02-21 RX ORDER — OMEPRAZOLE 20 MG/1
20 CAPSULE, DELAYED RELEASE ORAL DAILY
Qty: 30 CAPSULE | Refills: 12 | Status: SHIPPED | OUTPATIENT
Start: 2020-02-21 | End: 2021-04-06

## 2020-02-21 NOTE — PROGRESS NOTES
Chief complaint:  Follow-up /physical exam      59-year-old black female here for her physical.  Her mammogram is due.  All her blood work as a year and half ago.  She is getting a little bit weight we discussed the negative issues with diet pills and she does need to exercise and reduce her diet.      Reviewed her history of a hyperplastic polyp and she could go 10 years.  She might be comfortable going five years.    ROS:   CONST: weight stable. EYES: no vision change. ENT: no sore throat. CV: no chest pain w/ exertion. RESP: no shortness of breath. GI: no nausea, vomiting, diarrhea. No dysphagia. : no urinary issues. MUSCULOSKELETAL: no new myalgias or arthralgias. SKIN: no new changes. NEURO: no focal deficits. PSYCH: no new issues. ENDOCRINE: no polyuria. HEME: no lymph nodes. ALLERGY: no general pruritis.      Past medical history:  1.  Hypertension  2.  Hyperlipidemia  3.  Heart disease, seen by Dr. Tirado in the past and cardiac angiogram over 10 years ago showed only a 40% blockage- SARAH neg   4.  Normal colonoscopy at age 48, 10 years  5.  Mammograms done outside the system  6.  ALLERGIC rhinitis  7.  Vitamin D deficiency  8.  Positive PPD with normal chest x-rays  9.  Microcytic anemia  10.  Hyperplastic polyp on colonoscopy , 10 years  11.  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  Both brothers were smokers.    Vital signs as above,   Gen: no distress  EYES: conjunctiva clear, non-icteric, PERRL  ENT: nose clear, nasal mucosa normal, oropharynx clear and moist, teeth good  NECK:supple, thyroid non-palpable  RESP: effort is good, lungs clear  CV: heart RRR w/o murmur, gallops or rubs; no carotid  "bruits, no edema  GI: abdomen soft, non-distended, non-tender, no hepatosplenomegaly  MS: gait normal, no clubbing or cyanosis of the digits  SKIN: no rashes, warm to touch      Linh was seen today for medication refill and referral.    Diagnoses and all orders for this visit:    Routine medical exam, up-to-date on colon screening, arrange mammogram and lab work  -     Iron and TIBC; Future  -     Ferritin; Future  -     Comprehensive metabolic panel; Future  -     CBC auto differential; Future  -     TSH; Future  -     Lipid panel; Future  -     Vitamin D; Future    Essential hypertension, chronic and stable  -     Iron and TIBC; Future  -     Ferritin; Future  -     Comprehensive metabolic panel; Future  -     CBC auto differential; Future  -     TSH; Future  -     Lipid panel; Future  -     Vitamin D; Future    Chronic allergic rhinitis, chronic and stable    Angioedema, subsequent encounter, no apparent recurrence    Vitamin D deficiency disease  -     Vitamin D; Future    Hyperlipidemia, unspecified hyperlipidemia type    Coronary artery disease involving native coronary artery of native heart without angina pectoris    Microcytic anemia    Encounter for screening mammogram for breast cancer  -     Mammo Digital Screening Bilat; Future    Gastroesophageal reflux disease, esophagitis presence not specified  -     omeprazole (PRILOSEC) 20 MG capsule; Take 1 capsule (20 mg total) by mouth once daily.                Clinical note will be sensitive aced upon the need to document the significantly late arrivals"------"This note will not be shared with the patient."  "

## 2020-02-27 ENCOUNTER — HOSPITAL ENCOUNTER (OUTPATIENT)
Dept: RADIOLOGY | Facility: HOSPITAL | Age: 60
Discharge: HOME | End: 2020-02-27
Attending: INTERNAL MEDICINE
Payer: COMMERCIAL

## 2020-02-27 DIAGNOSIS — Z12.31 ENCOUNTER FOR SCREENING MAMMOGRAM FOR BREAST CANCER: ICD-10-CM

## 2020-03-03 ENCOUNTER — HOSPITAL ENCOUNTER (OUTPATIENT)
Dept: RADIOLOGY | Facility: HOSPITAL | Age: 60
Discharge: HOME | End: 2020-03-03
Attending: INTERNAL MEDICINE
Payer: COMMERCIAL

## 2020-03-03 DIAGNOSIS — Z12.31 ENCOUNTER FOR SCREENING MAMMOGRAM FOR BREAST CANCER: ICD-10-CM

## 2020-03-03 PROCEDURE — 77067 SCR MAMMO BI INCL CAD: CPT | Mod: 26,,, | Performed by: RADIOLOGY

## 2020-03-03 PROCEDURE — 77063 BREAST TOMOSYNTHESIS BI: CPT | Mod: 26,,, | Performed by: RADIOLOGY

## 2020-03-03 PROCEDURE — 77067 SCR MAMMO BI INCL CAD: CPT | Mod: TC,PO

## 2020-03-03 PROCEDURE — 77067 MAMMO DIGITAL SCREENING BILAT WITH TOMOSYNTHESIS_CAD: ICD-10-PCS | Mod: 26,,, | Performed by: RADIOLOGY

## 2020-03-03 PROCEDURE — 77063 MAMMO DIGITAL SCREENING BILAT WITH TOMOSYNTHESIS_CAD: ICD-10-PCS | Mod: 26,,, | Performed by: RADIOLOGY

## 2020-05-07 ENCOUNTER — LAB VISIT (OUTPATIENT)
Dept: PRIMARY CARE CLINIC | Facility: CLINIC | Age: 60
End: 2020-05-07
Payer: COMMERCIAL

## 2020-05-07 DIAGNOSIS — R05.9 COUGH: Primary | ICD-10-CM

## 2020-05-07 PROCEDURE — U0002 COVID-19 LAB TEST NON-CDC: HCPCS

## 2020-05-08 LAB — SARS-COV-2 RNA RESP QL NAA+PROBE: NOT DETECTED

## 2020-05-26 ENCOUNTER — TELEPHONE (OUTPATIENT)
Dept: FAMILY MEDICINE | Facility: CLINIC | Age: 60
End: 2020-05-26

## 2020-05-26 NOTE — TELEPHONE ENCOUNTER
----- Message from Deidra Desai sent at 5/26/2020  3:18 PM CDT -----  Contact: Self [phone]  Type: Patient Call Back    Who called: Self    What is the request in detail: pt is calling in regards to finding out if the doctor can prescribe her something for her sinuses draining. Pt pharmacy is   [id number]  [place] - [address]  [address]  [place] [address]  Phone: [phone] Fax: [phone]    Can the clinic reply by [facility]? Call back    Would the patient rather a call back or a response via [facility]? Call back    Best call back number: [phone]

## 2020-05-27 ENCOUNTER — OFFICE VISIT (OUTPATIENT)
Dept: FAMILY MEDICINE | Facility: CLINIC | Age: 60
End: 2020-05-27
Payer: COMMERCIAL

## 2020-05-27 VITALS
HEART RATE: 95 BPM | WEIGHT: 171.31 LBS | BODY MASS INDEX: 29.24 KG/M2 | DIASTOLIC BLOOD PRESSURE: 78 MMHG | SYSTOLIC BLOOD PRESSURE: 126 MMHG | HEIGHT: 64 IN | TEMPERATURE: 98 F | OXYGEN SATURATION: 96 %

## 2020-05-27 DIAGNOSIS — B96.89 ACUTE BACTERIAL SINUSITIS: Primary | ICD-10-CM

## 2020-05-27 DIAGNOSIS — J01.90 ACUTE BACTERIAL SINUSITIS: Primary | ICD-10-CM

## 2020-05-27 PROCEDURE — 3078F DIAST BP <80 MM HG: CPT | Mod: CPTII,S$GLB,, | Performed by: PHYSICIAN ASSISTANT

## 2020-05-27 PROCEDURE — 99214 PR OFFICE/OUTPT VISIT, EST, LEVL IV, 30-39 MIN: ICD-10-PCS | Mod: 25,S$GLB,, | Performed by: PHYSICIAN ASSISTANT

## 2020-05-27 PROCEDURE — 96372 PR INJECTION,THERAP/PROPH/DIAG2ST, IM OR SUBCUT: ICD-10-PCS | Mod: S$GLB,,, | Performed by: PHYSICIAN ASSISTANT

## 2020-05-27 PROCEDURE — 3074F SYST BP LT 130 MM HG: CPT | Mod: CPTII,S$GLB,, | Performed by: PHYSICIAN ASSISTANT

## 2020-05-27 PROCEDURE — 99214 OFFICE O/P EST MOD 30 MIN: CPT | Mod: 25,S$GLB,, | Performed by: PHYSICIAN ASSISTANT

## 2020-05-27 PROCEDURE — 99999 PR PBB SHADOW E&M-EST. PATIENT-LVL III: ICD-10-PCS | Mod: PBBFAC,,, | Performed by: PHYSICIAN ASSISTANT

## 2020-05-27 PROCEDURE — 3008F PR BODY MASS INDEX (BMI) DOCUMENTED: ICD-10-PCS | Mod: CPTII,S$GLB,, | Performed by: PHYSICIAN ASSISTANT

## 2020-05-27 PROCEDURE — 3078F PR MOST RECENT DIASTOLIC BLOOD PRESSURE < 80 MM HG: ICD-10-PCS | Mod: CPTII,S$GLB,, | Performed by: PHYSICIAN ASSISTANT

## 2020-05-27 PROCEDURE — 3074F PR MOST RECENT SYSTOLIC BLOOD PRESSURE < 130 MM HG: ICD-10-PCS | Mod: CPTII,S$GLB,, | Performed by: PHYSICIAN ASSISTANT

## 2020-05-27 PROCEDURE — 96372 THER/PROPH/DIAG INJ SC/IM: CPT | Mod: S$GLB,,, | Performed by: PHYSICIAN ASSISTANT

## 2020-05-27 PROCEDURE — 3008F BODY MASS INDEX DOCD: CPT | Mod: CPTII,S$GLB,, | Performed by: PHYSICIAN ASSISTANT

## 2020-05-27 PROCEDURE — 99999 PR PBB SHADOW E&M-EST. PATIENT-LVL III: CPT | Mod: PBBFAC,,, | Performed by: PHYSICIAN ASSISTANT

## 2020-05-27 RX ORDER — TRIAMCINOLONE ACETONIDE 40 MG/ML
40 INJECTION, SUSPENSION INTRA-ARTICULAR; INTRAMUSCULAR
Status: COMPLETED | OUTPATIENT
Start: 2020-05-27 | End: 2020-05-27

## 2020-05-27 RX ORDER — AMOXICILLIN AND CLAVULANATE POTASSIUM 875; 125 MG/1; MG/1
1 TABLET, FILM COATED ORAL 2 TIMES DAILY
Qty: 20 TABLET | Refills: 0 | Status: SHIPPED | OUTPATIENT
Start: 2020-05-27 | End: 2020-06-06

## 2020-05-27 RX ADMIN — TRIAMCINOLONE ACETONIDE 40 MG: 40 INJECTION, SUSPENSION INTRA-ARTICULAR; INTRAMUSCULAR at 02:05

## 2020-05-27 NOTE — PROGRESS NOTES
Patient Name: Linh Clifton    : 1960  MRN: [id number]    Subjective:  Linh is a 60 y.o. female who presents today for:    Chief Complaint   Patient presents with    Sinus Problem       HPI   Patient has multiple medical diagnoses as listed below in the history. Patient is new to me, but known to the clinic.     Sinus Pain: Patient complains of facial pain. Symptoms include nasal congestion, non productive cough, post nasal drip and sinus pressure with no fever, chills, night sweats or weight loss. Onset of symptoms was 1 week ago, unchanged since that time. She is drinking plenty of fluids.  She is taking Zyrtec and Flonase without relief. She denies dyspnea or chest pain. No myalgias. She denies known sick contacts. She has been working from home and social distancing with family only. She had COVID-19 screen two weeks ago which was negative.     Past Medical History  Past Medical History:   Diagnosis Date    Anemia 2015    Coronary artery disease     GERD (gastroesophageal reflux disease)     Hyperlipidemia     Hypertension     Microcytic anemia 2015    Positive PPD 2015    Vitamin D deficiency disease 2015       Past Surgical History  Past Surgical History:   Procedure Laterality Date    BREAST BIOPSY      BREAST CYST EXCISION      CATARACT EXTRACTION, BILATERAL Bilateral 2019    COLONOSCOPY N/A 6/15/2018    Procedure: COLONOSCOPY;  Surgeon: Mikael Wilde MD;  Location: [facility];  Service: Endoscopy;  Laterality: N/A;  confirmed-ss    HYSTERECTOMY      PARTIAL HYSTERECTOMY      over 21 years ago       Family History  Family History   Problem Relation Age of Onset    Heart disease Father     Hyperlipidemia Father     Hyperlipidemia Mother     Kidney disease Mother     Cancer Brother         pancreas    Heart disease Brother     Breast cancer Neg Hx     Colon cancer Neg Hx     Ovarian cancer Neg Hx        Social History  Social History     Socioeconomic  History    Marital status:      Spouse name: Not on file    Number of children: Not on file    Years of education: Not on file    Highest education level: Not on file   Occupational History    Occupation: [employer]     Employer: [employer]   Social Needs    Financial resource strain: Not on file    Food insecurity:     Worry: Not on file     Inability: Not on file    Transportation needs:     Medical: Not on file     Non-medical: Not on file   Tobacco Use    Smoking status: Never Smoker    Smokeless tobacco: Never Used   Substance and Sexual Activity    Alcohol use: Yes     Alcohol/week: 1.0 standard drinks     Types: 1 Glasses of wine per week     Comment: occasional drinking    Drug use: No    Sexual activity: Not on file   Lifestyle    Physical activity:     Days per week: Not on file     Minutes per session: Not on file    Stress: Not on file   Relationships    Social connections:     Talks on phone: Not on file     Gets together: Not on file     Attends Caodaism service: Not on file     Active member of club or organization: Not on file     Attends meetings of clubs or organizations: Not on file     Relationship status: Not on file   Other Topics Concern    Not on file   Social History Narrative    Not on file       Current Medications  Current Outpatient Medications on File Prior to Visit   Medication Sig Dispense Refill    aspirin 81 MG Chew Take 81 mg by mouth once daily.        atorvastatin (LIPITOR) 20 MG tablet TAKE 1 TABLET BY MOUTH ONCE DAILY 90 tablet 12    azelastine (ASTELIN) 137 mcg (0.1 %) nasal spray 1 spray (137 mcg total) by Nasal route 2 (two) times daily. 30 mL 0    cetirizine (ZYRTEC) 10 MG tablet Take 10 mg by mouth once daily.      chlorpheniramine (CHLOR-TRIMETON) 4 mg tablet Take 4 mg by mouth every 6 (six) hours as needed for Allergies.      ergocalciferol (VITAMIN D2) 50,000 unit Cap Take 1 capsule (50,000 Units total) by mouth every 7 days. 12  "capsule 12    fluticasone (FLONASE) 50 mcg/actuation nasal spray 2 sprays by Each Nare route once daily. 16 g 0    hydroCHLOROthiazide (HYDRODIURIL) 25 MG tablet Take 1 tablet (25 mg total) by mouth once daily. 90 tablet 12    hydrOXYzine HCl (ATARAX) 25 MG tablet Take 25 mg by mouth.      metoprolol succinate (TOPROL-XL) 25 MG 24 hr tablet Take 1 tablet (25 mg total) by mouth once daily. 30 tablet 12    nystatin (MYCOSTATIN) cream Apply topically 2 (two) times daily. Apply to affected area BID 30 g 3    omeprazole (PRILOSEC) 20 MG capsule Take 1 capsule (20 mg total) by mouth once daily. 30 capsule 12    acyclovir (ZOVIRAX) 400 MG tablet Take 1 tablet (400 mg total) by mouth 3 (three) times daily as needed. 30 tablet 11    hydrocortisone 2.5 % cream Apply topically 2 (two) times daily. for 7 days 28 g 0     No current facility-administered medications on file prior to visit.        Allergies   Review of patient's allergies indicates:  No Known Allergies      ROS  Review of Systems   Constitutional: Negative for chills, fatigue and fever.   HENT: Positive for congestion, postnasal drip, rhinorrhea, sinus pressure, sinus pain and sneezing. Negative for ear pain and sore throat.    Respiratory: Positive for cough. Negative for shortness of breath and wheezing.    Cardiovascular: Negative for chest pain and palpitations.   Gastrointestinal: Negative for abdominal pain and nausea.   Musculoskeletal: Negative for myalgias.   Skin: Negative for rash.   Neurological: Negative for light-headedness and headaches.   Hematological: Negative for adenopathy.         Objective:    /78 (BP Location: Left arm, Patient Position: Sitting, BP Method: Medium (Manual))   Pulse 95   Temp 98.2 °F (36.8 °C) (Oral)   Ht 5' 4" (1.626 m)   Wt 77.7 kg (171 lb 4.8 oz)   SpO2 96%   BMI 29.40 kg/m²     Physical Exam   Constitutional: Vital signs are normal.   HENT:   Head: Normocephalic.   Right Ear: Hearing, tympanic " membrane, external ear and ear canal normal. Tympanic membrane is not bulging. No middle ear effusion.   Left Ear: Hearing, tympanic membrane, external ear and ear canal normal. Tympanic membrane is not bulging.  No middle ear effusion.   Nose: Mucosal edema and rhinorrhea present. Right sinus exhibits maxillary sinus tenderness. Right sinus exhibits no frontal sinus tenderness. Left sinus exhibits no maxillary sinus tenderness and no frontal sinus tenderness.   Mouth/Throat: Uvula is midline and mucous membranes are normal. Posterior oropharyngeal erythema present. No oropharyngeal exudate or posterior oropharyngeal edema.   Eyes: Pupils are equal, round, and reactive to light. Conjunctivae, EOM and lids are normal.   Cardiovascular: Normal rate, regular rhythm, S1 normal and S2 normal.   Pulmonary/Chest: Effort normal and breath sounds normal. She has no wheezes.   Lymphadenopathy:     She has no cervical adenopathy.        Right: No supraclavicular adenopathy present.        Left: No supraclavicular adenopathy present.   Neurological: She is alert.   Skin: Skin is warm, dry and intact. No rash noted.   Psychiatric: She has a normal mood and affect. Judgment normal.       Assessment/Plan:  Linh Clifton is a 60 y.o. female who presents today for :    Linh was seen today for sinus problem.    Diagnoses and all orders for this visit:    Acute bacterial sinusitis  -     amoxicillin-clavulanate 875-125mg (AUGMENTIN) 875-125 mg per tablet; Take 1 tablet by mouth 2 (two) times daily. for 10 days  -     triamcinolone acetonide injection 40 mg  Discussed symptomatology of acute bacterial rhinosinusitis, including risk factors and proposed benefit, side effects/risks of antibiotic therapy. Advised to treat symptoms with tylenol/NSAID, nasal irrigation, fluticasone and antihistamine as needed.  · Drink plenty of water, hot tea, and other liquids. This may help thin mucus. It also may promote sinus drainage.  · Heat may  help soothe painful areas of the face. Use a towel soaked in hot water. Or,  the shower and direct the hot spray onto your face. Using a vaporizer along with a menthol rub at night may also help.   · An expectorant containing guaifenesin may help thin the mucus and promote drainage from the sinuses.  · Over-the-counter decongestants may be used unless a similar medicine was prescribed. Nasal sprays work the fastest. Use one that contains phenylephrine or oxymetazoline. First blow the nose gently. Then use the spray. Do not use these medicines more often than directed on the label or symptoms may get worse. You may also use tablets containing pseudoephedrine. Avoid products that combine ingredients, because side effects may be increased. Read labels. You can also ask the pharmacist for help. (NOTE: Persons with high blood pressure should not use decongestants. They can raise blood pressure.)  · Over-the-counter antihistamines may help if allergies contributed to your sinusitis.    · Use acetaminophen or ibuprofen to control pain, unless another pain medicine was prescribed. (If you have chronic liver or kidney disease or ever had a stomach ulcer, talk with your doctor before using these medicines. Aspirin should never be used in anyone under 18 years of age who is ill with a fever. It may cause severe liver damage.)  · Use nasal rinses or irrigation as instructed by your health care provider.  · Don't smoke. This can worsen symptoms.            I spent >50% of this 25 minute encounter counseling the patient on diagnoses, risk factors, and treatments.         Encouraged to call/return to clinic if symptoms persist or worsen    Lynette Castle PA-C  [facility]/ [facility]

## 2020-05-27 NOTE — PATIENT INSTRUCTIONS

## 2020-06-12 DIAGNOSIS — Z11.59 NEED FOR HEPATITIS C SCREENING TEST: ICD-10-CM

## 2020-10-08 ENCOUNTER — OFFICE VISIT (OUTPATIENT)
Dept: FAMILY MEDICINE | Facility: CLINIC | Age: 60
End: 2020-10-08
Payer: COMMERCIAL

## 2020-10-08 VITALS
DIASTOLIC BLOOD PRESSURE: 60 MMHG | SYSTOLIC BLOOD PRESSURE: 132 MMHG | TEMPERATURE: 98 F | HEIGHT: 67 IN | WEIGHT: 171.75 LBS | OXYGEN SATURATION: 99 % | HEART RATE: 85 BPM | BODY MASS INDEX: 26.96 KG/M2

## 2020-10-08 DIAGNOSIS — R60.9 EDEMA, UNSPECIFIED TYPE: Primary | ICD-10-CM

## 2020-10-08 DIAGNOSIS — I25.10 CORONARY ARTERY DISEASE INVOLVING NATIVE CORONARY ARTERY OF NATIVE HEART WITHOUT ANGINA PECTORIS: ICD-10-CM

## 2020-10-08 DIAGNOSIS — E78.5 HYPERLIPIDEMIA, UNSPECIFIED HYPERLIPIDEMIA TYPE: ICD-10-CM

## 2020-10-08 DIAGNOSIS — I10 ESSENTIAL HYPERTENSION: ICD-10-CM

## 2020-10-08 PROCEDURE — 99999 PR PBB SHADOW E&M-EST. PATIENT-LVL IV: ICD-10-PCS | Mod: PBBFAC,,, | Performed by: INTERNAL MEDICINE

## 2020-10-08 PROCEDURE — 3078F PR MOST RECENT DIASTOLIC BLOOD PRESSURE < 80 MM HG: ICD-10-PCS | Mod: CPTII,S$GLB,, | Performed by: INTERNAL MEDICINE

## 2020-10-08 PROCEDURE — 3008F PR BODY MASS INDEX (BMI) DOCUMENTED: ICD-10-PCS | Mod: CPTII,S$GLB,, | Performed by: INTERNAL MEDICINE

## 2020-10-08 PROCEDURE — 3075F SYST BP GE 130 - 139MM HG: CPT | Mod: CPTII,S$GLB,, | Performed by: INTERNAL MEDICINE

## 2020-10-08 PROCEDURE — 3075F PR MOST RECENT SYSTOLIC BLOOD PRESS GE 130-139MM HG: ICD-10-PCS | Mod: CPTII,S$GLB,, | Performed by: INTERNAL MEDICINE

## 2020-10-08 PROCEDURE — 99214 PR OFFICE/OUTPT VISIT, EST, LEVL IV, 30-39 MIN: ICD-10-PCS | Mod: S$GLB,,, | Performed by: INTERNAL MEDICINE

## 2020-10-08 PROCEDURE — 99214 OFFICE O/P EST MOD 30 MIN: CPT | Mod: S$GLB,,, | Performed by: INTERNAL MEDICINE

## 2020-10-08 PROCEDURE — 3078F DIAST BP <80 MM HG: CPT | Mod: CPTII,S$GLB,, | Performed by: INTERNAL MEDICINE

## 2020-10-08 PROCEDURE — 3008F BODY MASS INDEX DOCD: CPT | Mod: CPTII,S$GLB,, | Performed by: INTERNAL MEDICINE

## 2020-10-08 PROCEDURE — 99999 PR PBB SHADOW E&M-EST. PATIENT-LVL IV: CPT | Mod: PBBFAC,,, | Performed by: INTERNAL MEDICINE

## 2020-10-08 NOTE — PROGRESS NOTES
Chief complaint:  Follow-up , edema    60-year-old black female .   Comes in today with swollen ankles.  She had a negative exercise stress echo 2016.  Patient reports that Saturday five days ago she had the development of some swelling the days prior.  She woke up on  and was still swollen.  She went to urgent care.  They told her to elevate and went down fairly promptly.  Blood pressure was in the 152 range but has normalized.  She had no pain in legs otherwise to suggest a DVT.  We reviewed that she had not been using any anti-inflammatories or any obvious intake of salty foods or water, no prolonged travel and so forth.  No associated shortness of breath or symptoms of CHF.  This is not an ongoing problem.    Reviewed her history of a hyperplastic polyp 2018 and she could go 10 years.  She might be comfortable going five years.    ROS:   CONST: weight stable. EYES: no vision change. ENT: no sore throat. CV: no chest pain w/ exertion. RESP: no shortness of breath. GI: no nausea, vomiting, diarrhea. No dysphagia. : no urinary issues. MUSCULOSKELETAL: no new myalgias or arthralgias. SKIN: no new changes. NEURO: no focal deficits. PSYCH: no new issues. ENDOCRINE: no polyuria. HEME: no lymph nodes. ALLERGY: no general pruritis.      Past medical history:  1.  Hypertension  2.  Hyperlipidemia  3.  Heart disease, seen by Dr. Tirado in the past and cardiac angiogram over 10 years ago showed only a 40% blockage- SARAH neg   4.  Normal colonoscopy at age 48, hyperplastic polyp 2018 -10 years  5.  Mammograms done outside the system  6.  ALLERGIC rhinitis  7.  Vitamin D deficiency  8.  Positive PPD with normal chest x-rays  9.  Microcytic anemia  10.  Hyperplastic polyp on colonoscopy , 10 years  11.  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  Both brothers were smokers.    Vital signs as above,   Gen: no distress  EYES: conjunctiva clear, non-icteric, PERRL  ENT: nose clear, nasal mucosa normal, oropharynx clear and moist, teeth good  NECK:supple, thyroid non-palpable  RESP: effort is good, lungs clear  CV: heart RRR w/o murmur, gallops or rubs; no carotid bruits, no edema  GI: abdomen soft, non-distended, non-tender, no hepatosplenomegaly  MS: gait normal, no clubbing or cyanosis of the digits  SKIN: no rashes, warm to touch    Linh was seen today for joint swelling and hypertension.    Diagnoses and all orders for this visit:    Edema, unspecified type, isolated event and no suspicion for CHF for DVT or any obstructive process but we will be mindful and workup according the if it is a recurrent problem.  She may well have had some unrecognized fluid and salt intake and so forth a week prior.  Explained that her condition would not necessitate any Lasix which could be harmful.  If indeed it is a significant pattern we might institute Lasix appropriately.  She will keep me posted and encouraged her to use the [facility] system where upon she can even sent pictures and so forth.  She will watch for all the factors that we discussed.    Essential hypertension, improved, may well been some anxiety at the urgent care that raised it    Hyperlipidemia, unspecified hyperlipidemia type, chronic and stable    Coronary artery disease involving native coronary artery of native heart without angina pectoris, no symptoms of angina or CHF             Clinical note will be sensitive aced upon the need to document the significantly late arrivals"------ooooo"This note will not be shared with the patient."    "

## 2020-11-27 ENCOUNTER — TELEPHONE (OUTPATIENT)
Dept: FAMILY MEDICINE | Facility: CLINIC | Age: 60
End: 2020-11-27

## 2020-11-27 NOTE — TELEPHONE ENCOUNTER
----- Message from Joycelyn Ansari sent at 11/27/2020 12:47 PM CST -----  Type: Patient Call Back       What is the request in detail:  pt calling to speak to a nurse regarding PA on med below      hydrochlorothiazide 25 MG   atorvastatin calcium          Can the clinic reply by [facility]? No       Would the patient rather a call back or a response via [facility]? Call back       Best call back number:   [facility] [id number] [place], [place] - [address] [place] [address]  Phone: [phone] Fax: [phone]

## 2020-11-27 NOTE — TELEPHONE ENCOUNTER
Patient is requesting refills for below meds., not PA.  Patient informed that Dr. Chandler is out of the office today, therefore this request may not be reviewed until his return on 11/30/2020.  Patient verbalized understanding.

## 2020-11-27 NOTE — TELEPHONE ENCOUNTER
----- Message from Bo Shoemaker sent at 11/27/2020  2:50 PM CST -----  Regarding: SELF  Type:  Patient Returning Call    Who Called:  SELF    Who Left Message for Patient:  Luisito    Does the patient know what this is regarding?: NO     Would the patient rather a call back or a response via [facility]? CALL     Best Call Back Number: [phone]

## 2020-11-27 NOTE — TELEPHONE ENCOUNTER
Returned Patient's call.  No answer.  Left voice message on an unidentified recorder requesting a call back.

## 2020-11-28 RX ORDER — ATORVASTATIN CALCIUM 20 MG/1
20 TABLET, FILM COATED ORAL DAILY
Qty: 90 TABLET | Refills: 0 | Status: SHIPPED | OUTPATIENT
Start: 2020-11-28 | End: 2020-11-29 | Stop reason: SDUPTHER

## 2020-11-28 RX ORDER — HYDROCHLOROTHIAZIDE 25 MG/1
25 TABLET ORAL DAILY
Qty: 90 TABLET | Refills: 0 | Status: SHIPPED | OUTPATIENT
Start: 2020-11-28 | End: 2020-11-29 | Stop reason: SDUPTHER

## 2020-11-29 RX ORDER — ATORVASTATIN CALCIUM 20 MG/1
20 TABLET, FILM COATED ORAL DAILY
Qty: 90 TABLET | Refills: 3 | Status: SHIPPED | OUTPATIENT
Start: 2020-11-29 | End: 2022-04-25

## 2020-11-29 RX ORDER — HYDROCHLOROTHIAZIDE 25 MG/1
25 TABLET ORAL DAILY
Qty: 90 TABLET | Refills: 3 | Status: SHIPPED | OUTPATIENT
Start: 2020-11-29 | End: 2022-03-22

## 2021-02-01 RX ORDER — ERGOCALCIFEROL 1.25 MG/1
CAPSULE ORAL
Qty: 12 CAPSULE | Refills: 12 | Status: SHIPPED | OUTPATIENT
Start: 2021-02-01 | End: 2022-04-25

## 2021-03-09 ENCOUNTER — HOSPITAL ENCOUNTER (OUTPATIENT)
Dept: RADIOLOGY | Facility: HOSPITAL | Age: 61
Discharge: HOME | End: 2021-03-09
Attending: PODIATRIST
Payer: COMMERCIAL

## 2021-03-09 ENCOUNTER — OFFICE VISIT (OUTPATIENT)
Dept: PODIATRY | Facility: CLINIC | Age: 61
End: 2021-03-09
Payer: COMMERCIAL

## 2021-03-09 VITALS
SYSTOLIC BLOOD PRESSURE: 132 MMHG | HEIGHT: 67 IN | WEIGHT: 171.75 LBS | DIASTOLIC BLOOD PRESSURE: 60 MMHG | BODY MASS INDEX: 26.96 KG/M2

## 2021-03-09 DIAGNOSIS — R20.2 PARESTHESIA OF RIGHT FOOT: ICD-10-CM

## 2021-03-09 DIAGNOSIS — M79.671 FOOT PAIN, RIGHT: Primary | ICD-10-CM

## 2021-03-09 DIAGNOSIS — M20.5X1 HALLUX LIMITUS, ACQUIRED, RIGHT: ICD-10-CM

## 2021-03-09 DIAGNOSIS — M20.5X2 HALLUX LIMITUS, ACQUIRED, LEFT: ICD-10-CM

## 2021-03-09 DIAGNOSIS — M79.671 FOOT PAIN, RIGHT: ICD-10-CM

## 2021-03-09 PROCEDURE — 99203 PR OFFICE/OUTPT VISIT, NEW, LEVL III, 30-44 MIN: ICD-10-PCS | Mod: S$GLB,,, | Performed by: PODIATRIST

## 2021-03-09 PROCEDURE — 3075F PR MOST RECENT SYSTOLIC BLOOD PRESS GE 130-139MM HG: ICD-10-PCS | Mod: CPTII,S$GLB,, | Performed by: PODIATRIST

## 2021-03-09 PROCEDURE — 99203 OFFICE O/P NEW LOW 30 MIN: CPT | Mod: S$GLB,,, | Performed by: PODIATRIST

## 2021-03-09 PROCEDURE — 73630 X-RAY EXAM OF FOOT: CPT | Mod: TC,FY,PO,RT

## 2021-03-09 PROCEDURE — 3075F SYST BP GE 130 - 139MM HG: CPT | Mod: CPTII,S$GLB,, | Performed by: PODIATRIST

## 2021-03-09 PROCEDURE — 3008F PR BODY MASS INDEX (BMI) DOCUMENTED: ICD-10-PCS | Mod: CPTII,S$GLB,, | Performed by: PODIATRIST

## 2021-03-09 PROCEDURE — 1126F AMNT PAIN NOTED NONE PRSNT: CPT | Mod: S$GLB,,, | Performed by: PODIATRIST

## 2021-03-09 PROCEDURE — 1126F PR PAIN SEVERITY QUANTIFIED, NO PAIN PRESENT: ICD-10-PCS | Mod: S$GLB,,, | Performed by: PODIATRIST

## 2021-03-09 PROCEDURE — 99999 PR PBB SHADOW E&M-EST. PATIENT-LVL IV: CPT | Mod: PBBFAC,,, | Performed by: PODIATRIST

## 2021-03-09 PROCEDURE — 3008F BODY MASS INDEX DOCD: CPT | Mod: CPTII,S$GLB,, | Performed by: PODIATRIST

## 2021-03-09 PROCEDURE — 99999 PR PBB SHADOW E&M-EST. PATIENT-LVL IV: ICD-10-PCS | Mod: PBBFAC,,, | Performed by: PODIATRIST

## 2021-03-09 PROCEDURE — 3078F DIAST BP <80 MM HG: CPT | Mod: CPTII,S$GLB,, | Performed by: PODIATRIST

## 2021-03-09 PROCEDURE — 3078F PR MOST RECENT DIASTOLIC BLOOD PRESSURE < 80 MM HG: ICD-10-PCS | Mod: CPTII,S$GLB,, | Performed by: PODIATRIST

## 2021-03-09 PROCEDURE — 73630 X-RAY EXAM OF FOOT: CPT | Mod: 26,RT,, | Performed by: RADIOLOGY

## 2021-03-09 PROCEDURE — 73630 XR FOOT COMPLETE 3 VIEW RIGHT: ICD-10-PCS | Mod: 26,RT,, | Performed by: RADIOLOGY

## 2021-03-09 RX ORDER — MELOXICAM 15 MG/1
15 TABLET ORAL DAILY
Qty: 30 TABLET | Refills: 1 | Status: SHIPPED | OUTPATIENT
Start: 2021-03-09 | End: 2022-03-09

## 2021-03-10 ENCOUNTER — TELEPHONE (OUTPATIENT)
Dept: FAMILY MEDICINE | Facility: CLINIC | Age: 61
End: 2021-03-10

## 2021-03-10 DIAGNOSIS — Z12.31 ENCOUNTER FOR SCREENING MAMMOGRAM FOR BREAST CANCER: Primary | ICD-10-CM

## 2021-03-23 ENCOUNTER — OFFICE VISIT (OUTPATIENT)
Dept: OPTOMETRY | Facility: CLINIC | Age: 61
End: 2021-03-23
Payer: COMMERCIAL

## 2021-03-23 DIAGNOSIS — H10.13 ALLERGIC CONJUNCTIVITIS, BILATERAL: ICD-10-CM

## 2021-03-23 DIAGNOSIS — H02.053 TRICHIASIS OF EYELID WITHOUT ENTROPION, RIGHT: Primary | ICD-10-CM

## 2021-03-23 PROCEDURE — 99999 PR PBB SHADOW E&M-EST. PATIENT-LVL III: CPT | Mod: PBBFAC,,, | Performed by: OPTOMETRIST

## 2021-03-23 PROCEDURE — 99999 PR PBB SHADOW E&M-EST. PATIENT-LVL III: ICD-10-PCS | Mod: PBBFAC,,, | Performed by: OPTOMETRIST

## 2021-03-23 PROCEDURE — 1126F AMNT PAIN NOTED NONE PRSNT: CPT | Mod: S$GLB,,, | Performed by: OPTOMETRIST

## 2021-03-23 PROCEDURE — 92004 COMPRE OPH EXAM NEW PT 1/>: CPT | Mod: S$GLB,,, | Performed by: OPTOMETRIST

## 2021-03-23 PROCEDURE — 92004 PR EYE EXAM, NEW PATIENT,COMPREHESV: ICD-10-PCS | Mod: S$GLB,,, | Performed by: OPTOMETRIST

## 2021-03-23 PROCEDURE — 1126F PR PAIN SEVERITY QUANTIFIED, NO PAIN PRESENT: ICD-10-PCS | Mod: S$GLB,,, | Performed by: OPTOMETRIST

## 2021-04-12 ENCOUNTER — HOSPITAL ENCOUNTER (OUTPATIENT)
Dept: RADIOLOGY | Facility: HOSPITAL | Age: 61
Discharge: HOME | End: 2021-04-12
Attending: INTERNAL MEDICINE
Payer: COMMERCIAL

## 2021-04-12 VITALS — WEIGHT: 171 LBS | BODY MASS INDEX: 26.84 KG/M2 | HEIGHT: 67 IN

## 2021-04-12 DIAGNOSIS — Z12.31 ENCOUNTER FOR SCREENING MAMMOGRAM FOR BREAST CANCER: ICD-10-CM

## 2021-04-12 PROCEDURE — 77067 MAMMO DIGITAL SCREENING BILAT WITH TOMO: ICD-10-PCS | Mod: 26,,, | Performed by: RADIOLOGY

## 2021-04-12 PROCEDURE — 77063 MAMMO DIGITAL SCREENING BILAT WITH TOMO: ICD-10-PCS | Mod: 26,,, | Performed by: RADIOLOGY

## 2021-04-12 PROCEDURE — 77067 SCR MAMMO BI INCL CAD: CPT | Mod: TC,PO

## 2021-04-12 PROCEDURE — 77063 BREAST TOMOSYNTHESIS BI: CPT | Mod: 26,,, | Performed by: RADIOLOGY

## 2021-04-12 PROCEDURE — 77067 SCR MAMMO BI INCL CAD: CPT | Mod: 26,,, | Performed by: RADIOLOGY

## 2021-05-10 DIAGNOSIS — K21.9 GASTROESOPHAGEAL REFLUX DISEASE: ICD-10-CM

## 2021-05-11 RX ORDER — OMEPRAZOLE 20 MG/1
CAPSULE, DELAYED RELEASE ORAL
Qty: 30 CAPSULE | Refills: 12 | Status: SHIPPED | OUTPATIENT
Start: 2021-05-11 | End: 2022-06-01

## 2021-05-20 ENCOUNTER — OFFICE VISIT (OUTPATIENT)
Dept: PODIATRY | Facility: CLINIC | Age: 61
End: 2021-05-20
Payer: COMMERCIAL

## 2021-05-20 VITALS — BODY MASS INDEX: 26.85 KG/M2 | HEIGHT: 67 IN | WEIGHT: 171.06 LBS

## 2021-05-20 DIAGNOSIS — R20.2 PARESTHESIA OF RIGHT FOOT: ICD-10-CM

## 2021-05-20 DIAGNOSIS — M20.5X1 HALLUX LIMITUS, ACQUIRED, RIGHT: Primary | ICD-10-CM

## 2021-05-20 DIAGNOSIS — M20.5X2 HALLUX LIMITUS, ACQUIRED, LEFT: ICD-10-CM

## 2021-05-20 PROCEDURE — 1125F AMNT PAIN NOTED PAIN PRSNT: CPT | Mod: S$GLB,,, | Performed by: PODIATRIST

## 2021-05-20 PROCEDURE — 99999 PR PBB SHADOW E&M-EST. PATIENT-LVL III: ICD-10-PCS | Mod: PBBFAC,,, | Performed by: PODIATRIST

## 2021-05-20 PROCEDURE — 1125F PR PAIN SEVERITY QUANTIFIED, PAIN PRESENT: ICD-10-PCS | Mod: S$GLB,,, | Performed by: PODIATRIST

## 2021-05-20 PROCEDURE — 99213 PR OFFICE/OUTPT VISIT, EST, LEVL III, 20-29 MIN: ICD-10-PCS | Mod: S$GLB,,, | Performed by: PODIATRIST

## 2021-05-20 PROCEDURE — 3008F BODY MASS INDEX DOCD: CPT | Mod: CPTII,S$GLB,, | Performed by: PODIATRIST

## 2021-05-20 PROCEDURE — 99999 PR PBB SHADOW E&M-EST. PATIENT-LVL III: CPT | Mod: PBBFAC,,, | Performed by: PODIATRIST

## 2021-05-20 PROCEDURE — 3008F PR BODY MASS INDEX (BMI) DOCUMENTED: ICD-10-PCS | Mod: CPTII,S$GLB,, | Performed by: PODIATRIST

## 2021-05-20 PROCEDURE — 99213 OFFICE O/P EST LOW 20 MIN: CPT | Mod: S$GLB,,, | Performed by: PODIATRIST

## 2021-06-15 RX ORDER — ACYCLOVIR 400 MG/1
400 TABLET ORAL 3 TIMES DAILY PRN
Qty: 30 TABLET | Refills: 11 | Status: SHIPPED | OUTPATIENT
Start: 2021-06-15 | End: 2023-10-27

## 2021-08-17 ENCOUNTER — TELEPHONE (OUTPATIENT)
Dept: FAMILY MEDICINE | Facility: CLINIC | Age: 61
End: 2021-08-17

## 2021-08-18 ENCOUNTER — HOSPITAL ENCOUNTER (OUTPATIENT)
Dept: RADIOLOGY | Facility: HOSPITAL | Age: 61
Discharge: HOME | End: 2021-08-18
Attending: NURSE PRACTITIONER
Payer: COMMERCIAL

## 2021-08-18 ENCOUNTER — TELEPHONE (OUTPATIENT)
Dept: FAMILY MEDICINE | Facility: CLINIC | Age: 61
End: 2021-08-18

## 2021-08-18 ENCOUNTER — OFFICE VISIT (OUTPATIENT)
Dept: FAMILY MEDICINE | Facility: CLINIC | Age: 61
End: 2021-08-18
Payer: COMMERCIAL

## 2021-08-18 VITALS
HEART RATE: 75 BPM | RESPIRATION RATE: 18 BRPM | HEIGHT: 66 IN | WEIGHT: 171.31 LBS | BODY MASS INDEX: 27.53 KG/M2 | TEMPERATURE: 98 F | OXYGEN SATURATION: 97 % | SYSTOLIC BLOOD PRESSURE: 134 MMHG | DIASTOLIC BLOOD PRESSURE: 70 MMHG

## 2021-08-18 DIAGNOSIS — I10 ESSENTIAL HYPERTENSION: Chronic | ICD-10-CM

## 2021-08-18 DIAGNOSIS — M25.572 PAIN AND SWELLING OF LEFT ANKLE: ICD-10-CM

## 2021-08-18 DIAGNOSIS — E78.5 HYPERLIPIDEMIA, UNSPECIFIED HYPERLIPIDEMIA TYPE: ICD-10-CM

## 2021-08-18 DIAGNOSIS — M25.472 PAIN AND SWELLING OF LEFT ANKLE: ICD-10-CM

## 2021-08-18 DIAGNOSIS — M25.472 PAIN AND SWELLING OF LEFT ANKLE: Primary | ICD-10-CM

## 2021-08-18 DIAGNOSIS — M25.572 PAIN AND SWELLING OF LEFT ANKLE: Primary | ICD-10-CM

## 2021-08-18 DIAGNOSIS — I25.10 CORONARY ARTERY DISEASE INVOLVING NATIVE CORONARY ARTERY OF NATIVE HEART WITHOUT ANGINA PECTORIS: ICD-10-CM

## 2021-08-18 PROCEDURE — 99999 PR PBB SHADOW E&M-EST. PATIENT-LVL V: CPT | Mod: PBBFAC,,, | Performed by: NURSE PRACTITIONER

## 2021-08-18 PROCEDURE — 73630 X-RAY EXAM OF FOOT: CPT | Mod: TC,FY,PO,LT

## 2021-08-18 PROCEDURE — 1159F PR MEDICATION LIST DOCUMENTED IN MEDICAL RECORD: ICD-10-PCS | Mod: CPTII,S$GLB,, | Performed by: NURSE PRACTITIONER

## 2021-08-18 PROCEDURE — 73630 X-RAY EXAM OF FOOT: CPT | Mod: 26,LT,, | Performed by: RADIOLOGY

## 2021-08-18 PROCEDURE — 1125F AMNT PAIN NOTED PAIN PRSNT: CPT | Mod: CPTII,S$GLB,, | Performed by: NURSE PRACTITIONER

## 2021-08-18 PROCEDURE — 99213 OFFICE O/P EST LOW 20 MIN: CPT | Mod: S$GLB,,, | Performed by: NURSE PRACTITIONER

## 2021-08-18 PROCEDURE — 73610 XR ANKLE COMPLETE 3 VIEW LEFT: ICD-10-PCS | Mod: 26,LT,, | Performed by: RADIOLOGY

## 2021-08-18 PROCEDURE — 1160F RVW MEDS BY RX/DR IN RCRD: CPT | Mod: CPTII,S$GLB,, | Performed by: NURSE PRACTITIONER

## 2021-08-18 PROCEDURE — 73610 X-RAY EXAM OF ANKLE: CPT | Mod: TC,FY,PO,LT

## 2021-08-18 PROCEDURE — 3075F PR MOST RECENT SYSTOLIC BLOOD PRESS GE 130-139MM HG: ICD-10-PCS | Mod: CPTII,S$GLB,, | Performed by: NURSE PRACTITIONER

## 2021-08-18 PROCEDURE — 99999 PR PBB SHADOW E&M-EST. PATIENT-LVL V: ICD-10-PCS | Mod: PBBFAC,,, | Performed by: NURSE PRACTITIONER

## 2021-08-18 PROCEDURE — 73630 XR FOOT COMPLETE 3 VIEW LEFT: ICD-10-PCS | Mod: 26,LT,, | Performed by: RADIOLOGY

## 2021-08-18 PROCEDURE — 1125F PR PAIN SEVERITY QUANTIFIED, PAIN PRESENT: ICD-10-PCS | Mod: CPTII,S$GLB,, | Performed by: NURSE PRACTITIONER

## 2021-08-18 PROCEDURE — 3078F DIAST BP <80 MM HG: CPT | Mod: CPTII,S$GLB,, | Performed by: NURSE PRACTITIONER

## 2021-08-18 PROCEDURE — 3075F SYST BP GE 130 - 139MM HG: CPT | Mod: CPTII,S$GLB,, | Performed by: NURSE PRACTITIONER

## 2021-08-18 PROCEDURE — 1160F PR REVIEW ALL MEDS BY PRESCRIBER/CLIN PHARMACIST DOCUMENTED: ICD-10-PCS | Mod: CPTII,S$GLB,, | Performed by: NURSE PRACTITIONER

## 2021-08-18 PROCEDURE — 3008F PR BODY MASS INDEX (BMI) DOCUMENTED: ICD-10-PCS | Mod: CPTII,S$GLB,, | Performed by: NURSE PRACTITIONER

## 2021-08-18 PROCEDURE — 3008F BODY MASS INDEX DOCD: CPT | Mod: CPTII,S$GLB,, | Performed by: NURSE PRACTITIONER

## 2021-08-18 PROCEDURE — 73610 X-RAY EXAM OF ANKLE: CPT | Mod: 26,LT,, | Performed by: RADIOLOGY

## 2021-08-18 PROCEDURE — 1159F MED LIST DOCD IN RCRD: CPT | Mod: CPTII,S$GLB,, | Performed by: NURSE PRACTITIONER

## 2021-08-18 PROCEDURE — 3078F PR MOST RECENT DIASTOLIC BLOOD PRESSURE < 80 MM HG: ICD-10-PCS | Mod: CPTII,S$GLB,, | Performed by: NURSE PRACTITIONER

## 2021-08-18 PROCEDURE — 99213 PR OFFICE/OUTPT VISIT, EST, LEVL III, 20-29 MIN: ICD-10-PCS | Mod: S$GLB,,, | Performed by: NURSE PRACTITIONER

## 2021-10-20 ENCOUNTER — OFFICE VISIT (OUTPATIENT)
Dept: CARDIOLOGY | Facility: CLINIC | Age: 61
End: 2021-10-20
Payer: COMMERCIAL

## 2021-10-20 VITALS
OXYGEN SATURATION: 98 % | BODY MASS INDEX: 27.32 KG/M2 | WEIGHT: 170 LBS | HEIGHT: 66 IN | HEART RATE: 79 BPM | SYSTOLIC BLOOD PRESSURE: 128 MMHG | DIASTOLIC BLOOD PRESSURE: 84 MMHG

## 2021-10-20 DIAGNOSIS — R06.09 DOE (DYSPNEA ON EXERTION): ICD-10-CM

## 2021-10-20 DIAGNOSIS — E78.5 HYPERLIPIDEMIA, UNSPECIFIED HYPERLIPIDEMIA TYPE: ICD-10-CM

## 2021-10-20 DIAGNOSIS — I10 PRIMARY HYPERTENSION: Chronic | ICD-10-CM

## 2021-10-20 DIAGNOSIS — R07.9 CHEST PAIN, UNSPECIFIED TYPE: ICD-10-CM

## 2021-10-20 DIAGNOSIS — R00.2 PALPITATIONS: ICD-10-CM

## 2021-10-20 DIAGNOSIS — I25.10 CORONARY ARTERY DISEASE INVOLVING NATIVE CORONARY ARTERY OF NATIVE HEART WITHOUT ANGINA PECTORIS: Primary | ICD-10-CM

## 2021-10-20 PROCEDURE — 93000 ELECTROCARDIOGRAM COMPLETE: CPT | Mod: S$GLB,,, | Performed by: INTERNAL MEDICINE

## 2021-10-20 PROCEDURE — 1159F PR MEDICATION LIST DOCUMENTED IN MEDICAL RECORD: ICD-10-PCS | Mod: CPTII,S$GLB,, | Performed by: INTERNAL MEDICINE

## 2021-10-20 PROCEDURE — 93000 EKG 12-LEAD: ICD-10-PCS | Mod: S$GLB,,, | Performed by: INTERNAL MEDICINE

## 2021-10-20 PROCEDURE — 99999 PR PBB SHADOW E&M-EST. PATIENT-LVL IV: ICD-10-PCS | Mod: PBBFAC,,, | Performed by: INTERNAL MEDICINE

## 2021-10-20 PROCEDURE — 3074F SYST BP LT 130 MM HG: CPT | Mod: CPTII,S$GLB,, | Performed by: INTERNAL MEDICINE

## 2021-10-20 PROCEDURE — 99204 OFFICE O/P NEW MOD 45 MIN: CPT | Mod: S$GLB,,, | Performed by: INTERNAL MEDICINE

## 2021-10-20 PROCEDURE — 1160F PR REVIEW ALL MEDS BY PRESCRIBER/CLIN PHARMACIST DOCUMENTED: ICD-10-PCS | Mod: CPTII,S$GLB,, | Performed by: INTERNAL MEDICINE

## 2021-10-20 PROCEDURE — 3008F PR BODY MASS INDEX (BMI) DOCUMENTED: ICD-10-PCS | Mod: CPTII,S$GLB,, | Performed by: INTERNAL MEDICINE

## 2021-10-20 PROCEDURE — 3008F BODY MASS INDEX DOCD: CPT | Mod: CPTII,S$GLB,, | Performed by: INTERNAL MEDICINE

## 2021-10-20 PROCEDURE — 99999 PR PBB SHADOW E&M-EST. PATIENT-LVL IV: CPT | Mod: PBBFAC,,, | Performed by: INTERNAL MEDICINE

## 2021-10-20 PROCEDURE — 3074F PR MOST RECENT SYSTOLIC BLOOD PRESSURE < 130 MM HG: ICD-10-PCS | Mod: CPTII,S$GLB,, | Performed by: INTERNAL MEDICINE

## 2021-10-20 PROCEDURE — 99204 PR OFFICE/OUTPT VISIT, NEW, LEVL IV, 45-59 MIN: ICD-10-PCS | Mod: S$GLB,,, | Performed by: INTERNAL MEDICINE

## 2021-10-20 PROCEDURE — 3079F PR MOST RECENT DIASTOLIC BLOOD PRESSURE 80-89 MM HG: ICD-10-PCS | Mod: CPTII,S$GLB,, | Performed by: INTERNAL MEDICINE

## 2021-10-20 PROCEDURE — 3079F DIAST BP 80-89 MM HG: CPT | Mod: CPTII,S$GLB,, | Performed by: INTERNAL MEDICINE

## 2021-10-20 PROCEDURE — 1159F MED LIST DOCD IN RCRD: CPT | Mod: CPTII,S$GLB,, | Performed by: INTERNAL MEDICINE

## 2021-10-20 PROCEDURE — 1160F RVW MEDS BY RX/DR IN RCRD: CPT | Mod: CPTII,S$GLB,, | Performed by: INTERNAL MEDICINE

## 2021-11-03 ENCOUNTER — HOSPITAL ENCOUNTER (OUTPATIENT)
Dept: CARDIOLOGY | Facility: HOSPITAL | Age: 61
Discharge: HOME | End: 2021-11-03
Attending: INTERNAL MEDICINE
Payer: COMMERCIAL

## 2021-11-03 DIAGNOSIS — R06.09 DOE (DYSPNEA ON EXERTION): ICD-10-CM

## 2021-11-03 DIAGNOSIS — I10 PRIMARY HYPERTENSION: ICD-10-CM

## 2021-11-03 DIAGNOSIS — R07.9 CHEST PAIN, UNSPECIFIED TYPE: ICD-10-CM

## 2021-11-03 DIAGNOSIS — R00.2 PALPITATIONS: ICD-10-CM

## 2021-11-03 DIAGNOSIS — E78.5 HYPERLIPIDEMIA, UNSPECIFIED HYPERLIPIDEMIA TYPE: ICD-10-CM

## 2021-11-03 DIAGNOSIS — I25.10 CORONARY ARTERY DISEASE INVOLVING NATIVE CORONARY ARTERY OF NATIVE HEART WITHOUT ANGINA PECTORIS: ICD-10-CM

## 2021-11-03 LAB
AORTIC ROOT ANNULUS: 2.43 CM
AORTIC VALVE CUSP SEPERATION: 1.5 CM
ASCENDING AORTA: 2.44 CM
AV INDEX (PROSTH): 0.66
AV MEAN GRADIENT: 3 MMHG
AV PEAK GRADIENT: 6 MMHG
AV VALVE AREA: 1.83 CM2
AV VELOCITY RATIO: 0.8
CV ECHO LV RWT: 0.53 CM
DOP CALC AO PEAK VEL: 1.18 M/S
DOP CALC AO VTI: 29.51 CM
DOP CALC LVOT AREA: 2.8 CM2
DOP CALC LVOT DIAMETER: 1.88 CM
DOP CALC LVOT PEAK VEL: 0.94 M/S
DOP CALC LVOT STROKE VOLUME: 54.05 CM3
DOP CALCLVOT PEAK VEL VTI: 19.48 CM
E WAVE DECELERATION TIME: 138.29 MSEC
E/A RATIO: 1.19
E/E' RATIO: 11.88 M/S
ECHO LV POSTERIOR WALL: 1.02 CM (ref 0.6–1.1)
EJECTION FRACTION: 65 %
FRACTIONAL SHORTENING: 38 % (ref 28–44)
INTERVENTRICULAR SEPTUM: 1.15 CM (ref 0.6–1.1)
IVRT: 87.66 MSEC
LA MAJOR: 5.01 CM
LA MINOR: 4.65 CM
LA WIDTH: 3.67 CM
LEFT ATRIUM SIZE: 3.25 CM
LEFT ATRIUM VOLUME: 48.9 CM3
LEFT INTERNAL DIMENSION IN SYSTOLE: 2.38 CM (ref 2.1–4)
LEFT VENTRICLE DIASTOLIC VOLUME: 64.47 ML
LEFT VENTRICLE SYSTOLIC VOLUME: 19.76 ML
LEFT VENTRICULAR INTERNAL DIMENSION IN DIASTOLE: 3.86 CM (ref 3.5–6)
LEFT VENTRICULAR MASS: 135.17 G
LV LATERAL E/E' RATIO: 11.22 M/S
LV SEPTAL E/E' RATIO: 12.63 M/S
MV PEAK A VEL: 0.85 M/S
MV PEAK E VEL: 1.01 M/S
MV STENOSIS PRESSURE HALF TIME: 40.11 MS
MV VALVE AREA P 1/2 METHOD: 5.48 CM2
PISA TR MAX VEL: 2.59 M/S
PV PEAK VELOCITY: 0.86 CM/S
RA MAJOR: 3.76 CM
RA PRESSURE: 3 MMHG
RA WIDTH: 3.11 CM
RIGHT VENTRICULAR END-DIASTOLIC DIMENSION: 2.47 CM
RV TISSUE DOPPLER FREE WALL SYSTOLIC VELOCITY 1 (APICAL 4 CHAMBER VIEW): 11.56 CM/S
SINUS: 2.71 CM
STJ: 2.41 CM
TDI LATERAL: 0.09 M/S
TDI SEPTAL: 0.08 M/S
TDI: 0.09 M/S
TR MAX PG: 27 MMHG
TRICUSPID ANNULAR PLANE SYSTOLIC EXCURSION: 2 CM
TV REST PULMONARY ARTERY PRESSURE: 30 MMHG

## 2021-11-03 PROCEDURE — 93227 HOLTER MONITOR - 24 HOUR (CUPID ONLY): ICD-10-PCS | Mod: ,,, | Performed by: INTERNAL MEDICINE

## 2021-11-03 PROCEDURE — 93306 ECHO (CUPID ONLY): ICD-10-PCS | Mod: 26,,, | Performed by: INTERNAL MEDICINE

## 2021-11-03 PROCEDURE — 93306 TTE W/DOPPLER COMPLETE: CPT | Mod: 26,,, | Performed by: INTERNAL MEDICINE

## 2021-11-03 PROCEDURE — 93225 XTRNL ECG REC<48 HRS REC: CPT

## 2021-11-03 PROCEDURE — 93306 TTE W/DOPPLER COMPLETE: CPT

## 2021-11-03 PROCEDURE — 93227 XTRNL ECG REC<48 HR R&I: CPT | Mod: ,,, | Performed by: INTERNAL MEDICINE

## 2021-11-05 LAB
OHS CV EVENT MONITOR DAY: 0
OHS CV HOLTER LENGTH DECIMAL HOURS: 23.98
OHS CV HOLTER LENGTH HOURS: 23
OHS CV HOLTER LENGTH MINUTES: 59
OHS CV HOLTER SINUS AVERAGE HR: 78
OHS CV HOLTER SINUS MAX HR: 125
OHS CV HOLTER SINUS MIN HR: 58

## 2021-11-10 ENCOUNTER — OFFICE VISIT (OUTPATIENT)
Dept: CARDIOLOGY | Facility: CLINIC | Age: 61
End: 2021-11-10
Payer: COMMERCIAL

## 2021-11-10 VITALS
HEART RATE: 90 BPM | BODY MASS INDEX: 27.32 KG/M2 | SYSTOLIC BLOOD PRESSURE: 122 MMHG | OXYGEN SATURATION: 98 % | WEIGHT: 170 LBS | HEIGHT: 66 IN | DIASTOLIC BLOOD PRESSURE: 60 MMHG

## 2021-11-10 DIAGNOSIS — E78.5 HYPERLIPIDEMIA, UNSPECIFIED HYPERLIPIDEMIA TYPE: ICD-10-CM

## 2021-11-10 DIAGNOSIS — I10 PRIMARY HYPERTENSION: Chronic | ICD-10-CM

## 2021-11-10 DIAGNOSIS — I25.10 CORONARY ARTERY DISEASE INVOLVING NATIVE CORONARY ARTERY OF NATIVE HEART WITHOUT ANGINA PECTORIS: ICD-10-CM

## 2021-11-10 DIAGNOSIS — R00.2 PALPITATIONS: Primary | ICD-10-CM

## 2021-11-10 PROCEDURE — 3074F PR MOST RECENT SYSTOLIC BLOOD PRESSURE < 130 MM HG: ICD-10-PCS | Mod: CPTII,S$GLB,, | Performed by: INTERNAL MEDICINE

## 2021-11-10 PROCEDURE — 3008F BODY MASS INDEX DOCD: CPT | Mod: CPTII,S$GLB,, | Performed by: INTERNAL MEDICINE

## 2021-11-10 PROCEDURE — 99999 PR PBB SHADOW E&M-EST. PATIENT-LVL IV: ICD-10-PCS | Mod: PBBFAC,,, | Performed by: INTERNAL MEDICINE

## 2021-11-10 PROCEDURE — 3008F PR BODY MASS INDEX (BMI) DOCUMENTED: ICD-10-PCS | Mod: CPTII,S$GLB,, | Performed by: INTERNAL MEDICINE

## 2021-11-10 PROCEDURE — 99214 PR OFFICE/OUTPT VISIT, EST, LEVL IV, 30-39 MIN: ICD-10-PCS | Mod: S$GLB,,, | Performed by: INTERNAL MEDICINE

## 2021-11-10 PROCEDURE — 3078F PR MOST RECENT DIASTOLIC BLOOD PRESSURE < 80 MM HG: ICD-10-PCS | Mod: CPTII,S$GLB,, | Performed by: INTERNAL MEDICINE

## 2021-11-10 PROCEDURE — 1159F PR MEDICATION LIST DOCUMENTED IN MEDICAL RECORD: ICD-10-PCS | Mod: CPTII,S$GLB,, | Performed by: INTERNAL MEDICINE

## 2021-11-10 PROCEDURE — 1160F PR REVIEW ALL MEDS BY PRESCRIBER/CLIN PHARMACIST DOCUMENTED: ICD-10-PCS | Mod: CPTII,S$GLB,, | Performed by: INTERNAL MEDICINE

## 2021-11-10 PROCEDURE — 3074F SYST BP LT 130 MM HG: CPT | Mod: CPTII,S$GLB,, | Performed by: INTERNAL MEDICINE

## 2021-11-10 PROCEDURE — 99214 OFFICE O/P EST MOD 30 MIN: CPT | Mod: S$GLB,,, | Performed by: INTERNAL MEDICINE

## 2021-11-10 PROCEDURE — 1159F MED LIST DOCD IN RCRD: CPT | Mod: CPTII,S$GLB,, | Performed by: INTERNAL MEDICINE

## 2021-11-10 PROCEDURE — 99999 PR PBB SHADOW E&M-EST. PATIENT-LVL IV: CPT | Mod: PBBFAC,,, | Performed by: INTERNAL MEDICINE

## 2021-11-10 PROCEDURE — 3078F DIAST BP <80 MM HG: CPT | Mod: CPTII,S$GLB,, | Performed by: INTERNAL MEDICINE

## 2021-11-10 PROCEDURE — 1160F RVW MEDS BY RX/DR IN RCRD: CPT | Mod: CPTII,S$GLB,, | Performed by: INTERNAL MEDICINE

## 2022-01-19 RX ORDER — METOPROLOL SUCCINATE 25 MG/1
TABLET, EXTENDED RELEASE ORAL
Qty: 30 TABLET | Refills: 1 | Status: SHIPPED | OUTPATIENT
Start: 2022-01-19 | End: 2022-03-22

## 2022-03-10 ENCOUNTER — TELEPHONE (OUTPATIENT)
Dept: FAMILY MEDICINE | Facility: CLINIC | Age: 62
End: 2022-03-10
Payer: COMMERCIAL

## 2022-03-10 NOTE — TELEPHONE ENCOUNTER
----- Message from Pennie Barnes sent at 3/10/2022  3:45 PM CST -----  Type: Patient Call Back    Who called: self     What is the request in detail: Pt is requesting a call bk from nurse     Can the clinic reply by [facility]?    Would the patient rather a call back or a response via [facility]?  Call    Best call back number:[phone] (home)

## 2022-03-21 DIAGNOSIS — Z12.31 SCREENING MAMMOGRAM FOR BREAST CANCER: Primary | ICD-10-CM

## 2022-03-21 NOTE — TELEPHONE ENCOUNTER
Care Due:                  Date            Visit Type   Department     Provider  --------------------------------------------------------------------------------                                [facility]                              PRIMARY      MED/ INTERNAL  Feliciano Castellano  Last Visit: 10-      CARE ([facility])   MED/ PEDS      [place]  Next Visit: None Scheduled  None         None Found                                                            Last  Test          Frequency    Reason                     Performed    Due Date  --------------------------------------------------------------------------------    Office Visit  12 months..  acyclovir, atorvastatin,   10-   10-                             hydroCHLOROthiazide,                             omeprazole...............    CBC.........  12 months..  acyclovir................  Not Found    Overdue    CMP.........  12 months..  acyclovir, atorvastatin,   Not Found    Overdue                             hydroCHLOROthiazide......    Lipid Panel.  12 months..  atorvastatin.............  Not Found    Overdue    Powered by [employer]. Reference number: [id number].   3/21/2022 4:30:42 AM CDT

## 2022-03-22 RX ORDER — METOPROLOL SUCCINATE 25 MG/1
TABLET, EXTENDED RELEASE ORAL
Qty: 30 TABLET | Refills: 0 | Status: SHIPPED | OUTPATIENT
Start: 2022-03-22 | End: 2022-04-25

## 2022-03-22 RX ORDER — HYDROCHLOROTHIAZIDE 25 MG/1
TABLET ORAL
Qty: 90 TABLET | Refills: 0 | Status: SHIPPED | OUTPATIENT
Start: 2022-03-22 | End: 2022-06-23 | Stop reason: SDUPTHER

## 2022-03-31 NOTE — TELEPHONE ENCOUNTER
----- Message from Vicentadelores Austin sent at 3/31/2022  1:46 PM CDT -----  Type: Patient Call Back    Who called: self    What is the request in detail: pt called in regards to getting an order put in for her to schedule her mammogram. Pt also would like to know about a possibly referral for an OBGYN due to her previous OBGYN no longer accepting her insurance. Please advise    Can the clinic reply by [facility]? No     Would the patient rather a call back or a response via [facility]? Call     Best call back number:.[phone]

## 2022-03-31 NOTE — TELEPHONE ENCOUNTER
Mammo screening scheduled for 05/03/2022. Phone number provided for OBGYN referral ([phone]).

## 2022-05-03 ENCOUNTER — HOSPITAL ENCOUNTER (OUTPATIENT)
Dept: RADIOLOGY | Facility: HOSPITAL | Age: 62
Discharge: HOME | End: 2022-05-03
Attending: INTERNAL MEDICINE
Payer: COMMERCIAL

## 2022-05-03 DIAGNOSIS — Z12.31 SCREENING MAMMOGRAM FOR BREAST CANCER: ICD-10-CM

## 2022-05-03 PROCEDURE — 77063 MAMMO DIGITAL SCREENING BILAT WITH TOMO: ICD-10-PCS | Mod: 26,,, | Performed by: RADIOLOGY

## 2022-05-03 PROCEDURE — 77067 SCR MAMMO BI INCL CAD: CPT | Mod: 26,,, | Performed by: RADIOLOGY

## 2022-05-03 PROCEDURE — 77067 MAMMO DIGITAL SCREENING BILAT WITH TOMO: ICD-10-PCS | Mod: 26,,, | Performed by: RADIOLOGY

## 2022-05-03 PROCEDURE — 77063 BREAST TOMOSYNTHESIS BI: CPT | Mod: 26,,, | Performed by: RADIOLOGY

## 2022-05-03 PROCEDURE — 77063 BREAST TOMOSYNTHESIS BI: CPT | Mod: TC,PO

## 2022-05-30 DIAGNOSIS — K21.9 GASTROESOPHAGEAL REFLUX DISEASE: ICD-10-CM

## 2022-05-30 NOTE — TELEPHONE ENCOUNTER
Care Due:                  Date            Visit Type   Department     Provider  --------------------------------------------------------------------------------                                [facility]                              PRIMARY      MED/ INTERNAL  Feliciano Castellano  Last Visit: 10-      CARE ([facility])   MED/ PEDS      [place]  Next Visit: None Scheduled  None         None Found                                                            Last  Test          Frequency    Reason                     Performed    Due Date  --------------------------------------------------------------------------------    Office Visit  12 months..  acyclovir, atorvastatin,   10-   10-                             hydroCHLOROthiazide,                             omeprazole...............    CBC.........  12 months..  acyclovir................  Not Found    Overdue    CMP.........  12 months..  acyclovir, atorvastatin,   Not Found    Overdue                             hydroCHLOROthiazide......    Lipid Panel.  12 months..  atorvastatin.............  Not Found    Overdue    [employer] Embedded Care Gaps. Reference number: [id number]. 5/30/2022   4:30:39 AM CDT

## 2022-06-01 RX ORDER — OMEPRAZOLE 20 MG/1
CAPSULE, DELAYED RELEASE ORAL
Qty: 30 CAPSULE | Refills: 0 | Status: SHIPPED | OUTPATIENT
Start: 2022-06-01 | End: 2022-06-23 | Stop reason: SDUPTHER

## 2022-06-01 RX ORDER — METOPROLOL SUCCINATE 25 MG/1
TABLET, EXTENDED RELEASE ORAL
Qty: 30 TABLET | Refills: 0 | Status: SHIPPED | OUTPATIENT
Start: 2022-06-01 | End: 2022-06-23 | Stop reason: SDUPTHER

## 2022-06-23 ENCOUNTER — OFFICE VISIT (OUTPATIENT)
Dept: FAMILY MEDICINE | Facility: CLINIC | Age: 62
End: 2022-06-23
Payer: COMMERCIAL

## 2022-06-23 ENCOUNTER — LAB VISIT (OUTPATIENT)
Dept: LAB | Facility: HOSPITAL | Age: 62
End: 2022-06-23
Attending: INTERNAL MEDICINE
Payer: COMMERCIAL

## 2022-06-23 VITALS
BODY MASS INDEX: 26.47 KG/M2 | TEMPERATURE: 98 F | HEIGHT: 66 IN | OXYGEN SATURATION: 99 % | SYSTOLIC BLOOD PRESSURE: 134 MMHG | HEART RATE: 81 BPM | WEIGHT: 164.69 LBS | DIASTOLIC BLOOD PRESSURE: 66 MMHG

## 2022-06-23 DIAGNOSIS — Z00.00 ROUTINE MEDICAL EXAM: Primary | ICD-10-CM

## 2022-06-23 DIAGNOSIS — Z01.419 WELL WOMAN EXAM: ICD-10-CM

## 2022-06-23 DIAGNOSIS — T78.3XXD ANGIOEDEMA, SUBSEQUENT ENCOUNTER: ICD-10-CM

## 2022-06-23 DIAGNOSIS — E78.5 HYPERLIPIDEMIA, UNSPECIFIED HYPERLIPIDEMIA TYPE: ICD-10-CM

## 2022-06-23 DIAGNOSIS — J30.2 SEASONAL ALLERGIC RHINITIS, UNSPECIFIED TRIGGER: ICD-10-CM

## 2022-06-23 DIAGNOSIS — D56.3 BETA THALASSEMIA TRAIT: ICD-10-CM

## 2022-06-23 DIAGNOSIS — I10 ESSENTIAL HYPERTENSION: ICD-10-CM

## 2022-06-23 DIAGNOSIS — E61.1 IRON DEFICIENCY: ICD-10-CM

## 2022-06-23 DIAGNOSIS — Z86.010 HISTORY OF COLONIC POLYPS: ICD-10-CM

## 2022-06-23 DIAGNOSIS — I25.10 CORONARY ARTERY DISEASE INVOLVING NATIVE CORONARY ARTERY OF NATIVE HEART WITHOUT ANGINA PECTORIS: ICD-10-CM

## 2022-06-23 DIAGNOSIS — E55.9 VITAMIN D DEFICIENCY DISEASE: ICD-10-CM

## 2022-06-23 DIAGNOSIS — D50.9 MICROCYTIC ANEMIA: ICD-10-CM

## 2022-06-23 DIAGNOSIS — Z00.00 ROUTINE MEDICAL EXAM: ICD-10-CM

## 2022-06-23 DIAGNOSIS — K21.9 GASTROESOPHAGEAL REFLUX DISEASE: ICD-10-CM

## 2022-06-23 LAB
25(OH)D3+25(OH)D2 SERPL-MCNC: 47 NG/ML (ref 30–96)
ALBUMIN SERPL BCP-MCNC: 3.6 G/DL (ref 3.5–5.2)
ALP SERPL-CCNC: 143 U/L (ref 55–135)
ALT SERPL W/O P-5'-P-CCNC: 23 U/L (ref 10–44)
ANION GAP SERPL CALC-SCNC: 10 MMOL/L (ref 8–16)
AST SERPL-CCNC: 22 U/L (ref 10–40)
BASOPHILS # BLD AUTO: 0.02 K/UL (ref 0–0.2)
BASOPHILS NFR BLD: 0.4 % (ref 0–1.9)
BILIRUB SERPL-MCNC: 0.3 MG/DL (ref 0.1–1)
BUN SERPL-MCNC: 13 MG/DL (ref 8–23)
CALCIUM SERPL-MCNC: 9.9 MG/DL (ref 8.7–10.5)
CHLORIDE SERPL-SCNC: 102 MMOL/L (ref 95–110)
CHOLEST SERPL-MCNC: 173 MG/DL (ref 120–199)
CHOLEST/HDLC SERPL: 2.7 {RATIO} (ref 2–5)
CO2 SERPL-SCNC: 30 MMOL/L (ref 23–29)
CREAT SERPL-MCNC: 0.8 MG/DL (ref 0.5–1.4)
DIFFERENTIAL METHOD: ABNORMAL
EOSINOPHIL # BLD AUTO: 0.2 K/UL (ref 0–0.5)
EOSINOPHIL NFR BLD: 3.8 % (ref 0–8)
ERYTHROCYTE [DISTWIDTH] IN BLOOD BY AUTOMATED COUNT: 18.2 % (ref 11.5–14.5)
EST. GFR  (AFRICAN AMERICAN): >60 ML/MIN/1.73 M^2
EST. GFR  (NON AFRICAN AMERICAN): >60 ML/MIN/1.73 M^2
ESTIMATED AVG GLUCOSE: 117 MG/DL (ref 68–131)
FERRITIN SERPL-MCNC: 15 NG/ML (ref 20–300)
GLUCOSE SERPL-MCNC: 99 MG/DL (ref 70–110)
HBA1C MFR BLD: 5.7 % (ref 4–5.6)
HCT VFR BLD AUTO: 34.5 % (ref 37–48.5)
HDLC SERPL-MCNC: 63 MG/DL (ref 40–75)
HDLC SERPL: 36.4 % (ref 20–50)
HGB BLD-MCNC: 10.1 G/DL (ref 12–16)
IMM GRANULOCYTES # BLD AUTO: 0.01 K/UL (ref 0–0.04)
IMM GRANULOCYTES NFR BLD AUTO: 0.2 % (ref 0–0.5)
IRON SERPL-MCNC: 35 UG/DL (ref 30–160)
LDLC SERPL CALC-MCNC: 96.4 MG/DL (ref 63–159)
LYMPHOCYTES # BLD AUTO: 1.8 K/UL (ref 1–4.8)
LYMPHOCYTES NFR BLD: 35.5 % (ref 18–48)
MCH RBC QN AUTO: 19.5 PG (ref 27–31)
MCHC RBC AUTO-ENTMCNC: 29.3 G/DL (ref 32–36)
MCV RBC AUTO: 67 FL (ref 82–98)
MONOCYTES # BLD AUTO: 0.5 K/UL (ref 0.3–1)
MONOCYTES NFR BLD: 9.8 % (ref 4–15)
NEUTROPHILS # BLD AUTO: 2.5 K/UL (ref 1.8–7.7)
NEUTROPHILS NFR BLD: 50.3 % (ref 38–73)
NONHDLC SERPL-MCNC: 110 MG/DL
NRBC BLD-RTO: 0 /100 WBC
PLATELET # BLD AUTO: 224 K/UL (ref 150–450)
PMV BLD AUTO: ABNORMAL FL (ref 9.2–12.9)
POTASSIUM SERPL-SCNC: 3.8 MMOL/L (ref 3.5–5.1)
PROT SERPL-MCNC: 7.4 G/DL (ref 6–8.4)
RBC # BLD AUTO: 5.18 M/UL (ref 4–5.4)
SATURATED IRON: 8 % (ref 20–50)
SODIUM SERPL-SCNC: 142 MMOL/L (ref 136–145)
TOTAL IRON BINDING CAPACITY: 441 UG/DL (ref 250–450)
TRANSFERRIN SERPL-MCNC: 298 MG/DL (ref 200–375)
TRIGL SERPL-MCNC: 68 MG/DL (ref 30–150)
TSH SERPL DL<=0.005 MIU/L-ACNC: 1.17 UIU/ML (ref 0.4–4)
WBC # BLD AUTO: 5.02 K/UL (ref 3.9–12.7)

## 2022-06-23 PROCEDURE — 82306 VITAMIN D 25 HYDROXY: CPT | Performed by: INTERNAL MEDICINE

## 2022-06-23 PROCEDURE — 3075F PR MOST RECENT SYSTOLIC BLOOD PRESS GE 130-139MM HG: ICD-10-PCS | Mod: CPTII,S$GLB,, | Performed by: INTERNAL MEDICINE

## 2022-06-23 PROCEDURE — 3075F SYST BP GE 130 - 139MM HG: CPT | Mod: CPTII,S$GLB,, | Performed by: INTERNAL MEDICINE

## 2022-06-23 PROCEDURE — 99999 PR PBB SHADOW E&M-EST. PATIENT-LVL IV: CPT | Mod: PBBFAC,,, | Performed by: INTERNAL MEDICINE

## 2022-06-23 PROCEDURE — 85025 COMPLETE CBC W/AUTO DIFF WBC: CPT | Performed by: INTERNAL MEDICINE

## 2022-06-23 PROCEDURE — 84443 ASSAY THYROID STIM HORMONE: CPT | Performed by: INTERNAL MEDICINE

## 2022-06-23 PROCEDURE — 83036 HEMOGLOBIN GLYCOSYLATED A1C: CPT | Performed by: INTERNAL MEDICINE

## 2022-06-23 PROCEDURE — 3008F BODY MASS INDEX DOCD: CPT | Mod: CPTII,S$GLB,, | Performed by: INTERNAL MEDICINE

## 2022-06-23 PROCEDURE — 1159F MED LIST DOCD IN RCRD: CPT | Mod: CPTII,S$GLB,, | Performed by: INTERNAL MEDICINE

## 2022-06-23 PROCEDURE — 84466 ASSAY OF TRANSFERRIN: CPT | Performed by: INTERNAL MEDICINE

## 2022-06-23 PROCEDURE — 3008F PR BODY MASS INDEX (BMI) DOCUMENTED: ICD-10-PCS | Mod: CPTII,S$GLB,, | Performed by: INTERNAL MEDICINE

## 2022-06-23 PROCEDURE — 3078F DIAST BP <80 MM HG: CPT | Mod: CPTII,S$GLB,, | Performed by: INTERNAL MEDICINE

## 2022-06-23 PROCEDURE — 36415 COLL VENOUS BLD VENIPUNCTURE: CPT | Mod: PO | Performed by: INTERNAL MEDICINE

## 2022-06-23 PROCEDURE — 99396 PR PREVENTIVE VISIT,EST,40-64: ICD-10-PCS | Mod: S$GLB,,, | Performed by: INTERNAL MEDICINE

## 2022-06-23 PROCEDURE — 1159F PR MEDICATION LIST DOCUMENTED IN MEDICAL RECORD: ICD-10-PCS | Mod: CPTII,S$GLB,, | Performed by: INTERNAL MEDICINE

## 2022-06-23 PROCEDURE — 80061 LIPID PANEL: CPT | Performed by: INTERNAL MEDICINE

## 2022-06-23 PROCEDURE — 99999 PR PBB SHADOW E&M-EST. PATIENT-LVL IV: ICD-10-PCS | Mod: PBBFAC,,, | Performed by: INTERNAL MEDICINE

## 2022-06-23 PROCEDURE — 82728 ASSAY OF FERRITIN: CPT | Performed by: INTERNAL MEDICINE

## 2022-06-23 PROCEDURE — 80053 COMPREHEN METABOLIC PANEL: CPT | Performed by: INTERNAL MEDICINE

## 2022-06-23 PROCEDURE — 3078F PR MOST RECENT DIASTOLIC BLOOD PRESSURE < 80 MM HG: ICD-10-PCS | Mod: CPTII,S$GLB,, | Performed by: INTERNAL MEDICINE

## 2022-06-23 PROCEDURE — 99396 PREV VISIT EST AGE 40-64: CPT | Mod: S$GLB,,, | Performed by: INTERNAL MEDICINE

## 2022-06-23 RX ORDER — HYDROCHLOROTHIAZIDE 25 MG/1
25 TABLET ORAL DAILY
Qty: 90 TABLET | Refills: 12 | Status: SHIPPED | OUTPATIENT
Start: 2022-06-23 | End: 2023-12-01

## 2022-06-23 RX ORDER — ATORVASTATIN CALCIUM 20 MG/1
20 TABLET, FILM COATED ORAL DAILY
Qty: 90 TABLET | Refills: 12 | Status: SHIPPED | OUTPATIENT
Start: 2022-06-23 | End: 2023-09-28

## 2022-06-23 RX ORDER — METOPROLOL SUCCINATE 25 MG/1
TABLET, EXTENDED RELEASE ORAL
Qty: 90 TABLET | Refills: 12 | Status: SHIPPED | OUTPATIENT
Start: 2022-06-23 | End: 2023-08-31

## 2022-06-23 RX ORDER — OMEPRAZOLE 20 MG/1
20 CAPSULE, DELAYED RELEASE ORAL DAILY
Qty: 90 CAPSULE | Refills: 12 | Status: SHIPPED | OUTPATIENT
Start: 2022-06-23 | End: 2023-12-01

## 2022-06-23 NOTE — PROGRESS NOTES
Chief complaint:  Follow-up /physical exam- last seen 2020      62-year-old black female here for her physical.   mammogram 5/22.  All her blood work was 2020.  Doing well, still working at a [employer].  For three weeks he has had some upper respiratory symptoms but really more when she gets home and lays down she gets a lot of head congestion.  She is only using chlor tabs at night.  She will get some Claritin and has Flonase at home.  We discussed assessing for home allergies.  This could also be just a resolving viral infection.  She was tested in the last couple of days and was negative for COVID.  We discussed the addition of other meds such as aspirin, Singulair and using nasal saline as well.  Allergy testing reviewed in the past was all negative for dust mites.      Reviewed her history of a hyperplastic polyp and she could go 10 years.  She might be comfortable going five years.    ROS:   CONST: weight stable. EYES: no vision change. ENT: no sore throat. CV: no chest pain w/ exertion. RESP: no shortness of breath. GI: no nausea, vomiting, diarrhea. No dysphagia. : no urinary issues. MUSCULOSKELETAL: no new myalgias or arthralgias. SKIN: no new changes. NEURO: no focal deficits. PSYCH: no new issues. ENDOCRINE: no polyuria. HEME: no lymph nodes. ALLERGY: no general pruritis.      Past medical history:  1.  Hypertension  2.  Hyperlipidemia  3.  Heart disease, seen by Dr. Tirado in the past and cardiac angiogram over 10 years ago showed only a 40% blockage- SARAH neg 2/16  4.  Normal colonoscopy at age 48, 1 polyp Hyperplastic polyp 5/18 - 5-10 years  5.  Mammograms done outside the system  6.  ALLERGIC rhinitis  7.  Vitamin D deficiency  8.  Positive PPD with normal chest x-rays  9.  Microcytic anemia -beta thalassemia trait AND iron def  10.  Hyperplastic polyp on colonoscopy 6/18, 10 years  11.  Acute urticaria and angioedema of uncertain etiology- seen allergy    Social history:   with 2 children, occasional wine, never smoked, works as a rehabilitation technician    Family history: Mom  of kidney failure, father  of MI in his 70s, one brother  of pancreatic cancer and one brother being treated for lung cancer.  Both brothers were smokers.    Vital signs as above,   Gen: no distress  EYES: conjunctiva clear, non-icteric, PERRL  ENT: nose clear, nasal mucosa normal, oropharynx clear and moist, teeth good  NECK:supple, thyroid non-palpable  RESP: effort is good, lungs clear  CV: heart RRR w/o murmur, gallops or rubs; no carotid bruits, no edema  GI: abdomen soft, non-distended, non-tender, no hepatosplenomegaly  MS: gait normal, no clubbing or cyanosis of the digits  SKIN: no rashes, warm to touch    Linh was seen today for annual exam.    Diagnoses and all orders for this visit:    Routine medical exam, due to insurance reasons she needs to find a new gyn and I put in a referral.  Updated labs.  Up-to-date on mammogram and colonoscopy.  Explained are hyperplastic polyps  -     Hemoglobin A1C; Future  -     Comprehensive Metabolic Panel; Future  -     Iron and TIBC; Future  -     Ferritin; Future  -     Vitamin D; Future  -     CBC Auto Differential; Future  -     TSH; Future  -     Lipid Panel; Future    History of colonic polyps    Essential hypertension, chronic and stable  -     Hemoglobin A1C; Future  -     Comprehensive Metabolic Panel; Future  -     Iron and TIBC; Future  -     Ferritin; Future  -     Vitamin D; Future  -     CBC Auto Differential; Future  -     TSH; Future  -     Lipid Panel; Future    Hyperlipidemia, unspecified hyperlipidemia type, chronic and stable previously on Lipitor  -     Lipid Panel; Future    Coronary artery disease involving native coronary artery of native heart without angina pectoris    Angioedema, subsequent encounter, no recurrence, seen allergy    Vitamin D deficiency disease  -     Vitamin D; Future    Microcytic anemia    Iron  deficiency, reassess and discuss with patient that if still iron deficient we may need to put her back on iron.  Some of her anemia was hereditary in some clearly iron deficient  -     Iron and TIBC; Future  -     Ferritin; Future    Beta thalassemia trait    Gastroesophageal reflux disease  -     omeprazole (PRILOSEC) 20 MG capsule; Take 1 capsule (20 mg total) by mouth once daily.    Seasonal allergic rhinitis, unspecified trigger versus resolving viral infection, and Claritin and Flonase, continue chloride tab at night    Well woman exam  -     Ambulatory referral/consult to Obstetrics / Gynecology; Future    Other orders  -     atorvastatin (LIPITOR) 20 MG tablet; Take 1 tablet (20 mg total) by mouth once daily.  -     metoprolol succinate (TOPROL-XL) 25 MG 24 hr tablet; TAKE 1 TABLET BY MOUTH ONCE DAILY DUE FOR CHECKUP IN NEAR FUTURE LAST SEEN 2020  -     hydroCHLOROthiazide (HYDRODIURIL) 25 MG tablet; Take 1 tablet (25 mg total) by mouth once daily.

## 2022-06-24 ENCOUNTER — TELEPHONE (OUTPATIENT)
Dept: FAMILY MEDICINE | Facility: CLINIC | Age: 62
End: 2022-06-24
Payer: COMMERCIAL

## 2022-07-14 ENCOUNTER — TELEPHONE (OUTPATIENT)
Dept: FAMILY MEDICINE | Facility: CLINIC | Age: 62
End: 2022-07-14
Payer: COMMERCIAL

## 2022-07-14 NOTE — TELEPHONE ENCOUNTER
----- Message from Jones Timmons LPN sent at 7/14/2022  8:14 AM CDT -----  Regarding: FW: self [phone]  Pt requesting new meds for covid symptoms.  ----- Message -----  From: Terra Esteban  Sent: 7/14/2022   8:06 AM CDT  To: Rmaesh BARKER Staff  Subject: self [phone]                                Type: Patient Call Back    Who called: self    What is the request in detail: patient has tested positive for COVID and has a sore throat, chills and  coughing. She would like to know if the provider can send an RX for something for her [phone]      [id number] - [place], [place] - [address] [place] [address]  Phone: [phone] Fax: [phone]      Can the clinic reply by [facility]? no    Would the patient rather a call back or a response via [facility]?  Call back    Best call back number: [phone]

## 2022-07-15 NOTE — TELEPHONE ENCOUNTER
Pt states she tested positive at work for covids. I told her to take OTC's for her symptoms. She said she has everything needed right now and will call if symptoms get worse.

## 2022-07-15 NOTE — TELEPHONE ENCOUNTER
----- Message from Buckmounikaavivamelo Antonino sent at 7/15/2022  7:20 AM CDT -----  Regarding: patient tested positive for covid on Wednesday/ request for medication  Type: Patient Call Back    Who called:Linh     What is the request in detail: the patient called to notify Dr. Chandler that she tested positive for covid on Wednesday at her job. Patient stated that her throat is super sore and she can not eat or swallow. She is also really congested. She would like something sent over to the pharmacy, especially for her throat.    Please send medication to:  [facility] [address] [place] [address] [address]    Can the clinic reply by [facility]?no     Would the patient rather a call back or a response via [facility]? Call back     Best call back number:[phone]

## 2022-08-05 ENCOUNTER — OFFICE VISIT (OUTPATIENT)
Dept: OBSTETRICS AND GYNECOLOGY | Facility: CLINIC | Age: 62
End: 2022-08-05
Payer: COMMERCIAL

## 2022-08-05 VITALS — DIASTOLIC BLOOD PRESSURE: 78 MMHG | BODY MASS INDEX: 26.79 KG/M2 | SYSTOLIC BLOOD PRESSURE: 128 MMHG | WEIGHT: 166 LBS

## 2022-08-05 DIAGNOSIS — Z01.419 WELL WOMAN EXAM: ICD-10-CM

## 2022-08-05 DIAGNOSIS — Z90.710 S/P HYSTERECTOMY: ICD-10-CM

## 2022-08-05 DIAGNOSIS — Z01.419 WELL WOMAN EXAM WITH ROUTINE GYNECOLOGICAL EXAM: Primary | ICD-10-CM

## 2022-08-05 PROCEDURE — 3074F SYST BP LT 130 MM HG: CPT | Mod: CPTII,S$GLB,, | Performed by: OBSTETRICS & GYNECOLOGY

## 2022-08-05 PROCEDURE — 3008F BODY MASS INDEX DOCD: CPT | Mod: CPTII,S$GLB,, | Performed by: OBSTETRICS & GYNECOLOGY

## 2022-08-05 PROCEDURE — 99386 PREV VISIT NEW AGE 40-64: CPT | Mod: S$GLB,,, | Performed by: OBSTETRICS & GYNECOLOGY

## 2022-08-05 PROCEDURE — 3008F PR BODY MASS INDEX (BMI) DOCUMENTED: ICD-10-PCS | Mod: CPTII,S$GLB,, | Performed by: OBSTETRICS & GYNECOLOGY

## 2022-08-05 PROCEDURE — 1159F MED LIST DOCD IN RCRD: CPT | Mod: CPTII,S$GLB,, | Performed by: OBSTETRICS & GYNECOLOGY

## 2022-08-05 PROCEDURE — 99999 PR PBB SHADOW E&M-EST. PATIENT-LVL III: ICD-10-PCS | Mod: PBBFAC,,, | Performed by: OBSTETRICS & GYNECOLOGY

## 2022-08-05 PROCEDURE — 99386 PR PREVENTIVE VISIT,NEW,40-64: ICD-10-PCS | Mod: S$GLB,,, | Performed by: OBSTETRICS & GYNECOLOGY

## 2022-08-05 PROCEDURE — 99999 PR PBB SHADOW E&M-EST. PATIENT-LVL III: CPT | Mod: PBBFAC,,, | Performed by: OBSTETRICS & GYNECOLOGY

## 2022-08-05 PROCEDURE — 3044F PR MOST RECENT HEMOGLOBIN A1C LEVEL <7.0%: ICD-10-PCS | Mod: CPTII,S$GLB,, | Performed by: OBSTETRICS & GYNECOLOGY

## 2022-08-05 PROCEDURE — 1159F PR MEDICATION LIST DOCUMENTED IN MEDICAL RECORD: ICD-10-PCS | Mod: CPTII,S$GLB,, | Performed by: OBSTETRICS & GYNECOLOGY

## 2022-08-05 PROCEDURE — 3074F PR MOST RECENT SYSTOLIC BLOOD PRESSURE < 130 MM HG: ICD-10-PCS | Mod: CPTII,S$GLB,, | Performed by: OBSTETRICS & GYNECOLOGY

## 2022-08-05 PROCEDURE — 3078F DIAST BP <80 MM HG: CPT | Mod: CPTII,S$GLB,, | Performed by: OBSTETRICS & GYNECOLOGY

## 2022-08-05 PROCEDURE — 3078F PR MOST RECENT DIASTOLIC BLOOD PRESSURE < 80 MM HG: ICD-10-PCS | Mod: CPTII,S$GLB,, | Performed by: OBSTETRICS & GYNECOLOGY

## 2022-08-05 PROCEDURE — 3044F HG A1C LEVEL LT 7.0%: CPT | Mod: CPTII,S$GLB,, | Performed by: OBSTETRICS & GYNECOLOGY

## 2022-08-05 NOTE — PROGRESS NOTES
SUBJECTIVE:   Linh Clifton is a 62 y.o. female   for annual well woman exam. No LMP recorded. Patient has had a hysterectomy..  She has no unusual complaints.      S/p TVH for aub/fibroid @ age 33, denies HRT  Denies vaginal bleeding  Denies abnl pap  Intermittent vaginal dryness with intercourse, lubrication helps    Past Medical History:   Diagnosis Date    Anemia 2015    Coronary artery disease     GERD (gastroesophageal reflux disease)     Hyperlipidemia     Hypertension     Microcytic anemia 2015    Positive PPD 2015    Vitamin D deficiency disease 2015     Past Surgical History:   Procedure Laterality Date    BREAST BIOPSY      BREAST CYST EXCISION      CATARACT EXTRACTION, BILATERAL Bilateral 2019    COLONOSCOPY N/A 6/15/2018    Procedure: COLONOSCOPY;  Surgeon: Mikael Wilde MD;  Location: [facility];  Service: Endoscopy;  Laterality: N/A;  confirmed-ss    HYSTERECTOMY      PARTIAL HYSTERECTOMY      over 21 years ago     Social History     Socioeconomic History    Marital status:    Occupational History    Occupation: PA asst     Employer: [employer]   Tobacco Use    Smoking status: Never Smoker    Smokeless tobacco: Never Used   Substance and Sexual Activity    Alcohol use: Yes     Alcohol/week: 1.0 standard drink     Types: 1 Glasses of wine per week     Comment: occasional drinking    Drug use: No     Family History   Problem Relation Age of Onset    Heart disease Father     Hyperlipidemia Father     Hyperlipidemia Mother     Kidney disease Mother     Cancer Brother         pancreas    Heart disease Brother     Breast cancer Neg Hx     Colon cancer Neg Hx     Ovarian cancer Neg Hx      OB History    Para Term  AB Living   2 2 2         SAB IAB Ectopic Multiple Live Births                  # Outcome Date GA Lbr Morales/2nd Weight Sex Delivery Anes PTL Lv   2 Term            1 Term               Obstetric Comments    S/p TVH for aub/fibroid @ age 33, denies HRT   Denies abnl pap, Pap 2019 NEG   MMG 5/2022 NEG         Current Outpatient Medications   Medication Sig Dispense Refill    acyclovir (ZOVIRAX) 400 MG tablet Take 1 tablet (400 mg total) by mouth 3 (three) times daily as needed. 30 tablet 11    aspirin 81 MG Chew Take 81 mg by mouth once daily.      atorvastatin (LIPITOR) 20 MG tablet Take 1 tablet (20 mg total) by mouth once daily. 90 tablet 12    azelastine (ASTELIN) 137 mcg (0.1 %) nasal spray 1 spray (137 mcg total) by Nasal route 2 (two) times daily. 30 mL 0    cetirizine (ZYRTEC) 10 MG tablet Take 10 mg by mouth once daily.      chlorpheniramine (CHLOR-TRIMETON) 4 mg tablet Take 4 mg by mouth every 6 (six) hours as needed for Allergies.      ergocalciferol (ERGOCALCIFEROL) 50,000 unit Cap Take 1 capsule (50,000 Units total) by mouth every 7 days. 12 capsule 0    fluticasone (FLONASE) 50 mcg/actuation nasal spray 2 sprays by Each Nare route once daily. 16 g 0    hydroCHLOROthiazide (HYDRODIURIL) 25 MG tablet Take 1 tablet (25 mg total) by mouth once daily. 90 tablet 12    hydrocortisone 2.5 % cream Apply topically 2 (two) times daily. for 7 days 28 g 0    hydrOXYzine HCl (ATARAX) 25 MG tablet Take 25 mg by mouth.      metoprolol succinate (TOPROL-XL) 25 MG 24 hr tablet TAKE 1 TABLET BY MOUTH ONCE DAILY DUE FOR CHECKUP IN NEAR FUTURE LAST SEEN 2020 90 tablet 12    nystatin (MYCOSTATIN) cream Apply topically 2 (two) times daily. Apply to affected area BID 30 g 3    omeprazole (PRILOSEC) 20 MG capsule Take 1 capsule (20 mg total) by mouth once daily. 90 capsule 12     No current facility-administered medications for this visit.     Allergies: Patient has no known allergies.       ROS:  GENERAL: Denies weight gain or weight loss. Feeling well overall.   SKIN: Denies rash or lesions.   HEAD: Denies head injury or headache.   NODES: Denies enlarged lymph nodes.   CHEST: Denies chest pain or shortness of  breath.   CARDIOVASCULAR: Denies palpitations or left sided chest pain.   ABDOMEN: No abdominal pain, constipation, diarrhea, nausea, vomiting or rectal bleeding.   URINARY: No frequency, dysuria, hematuria, or burning on urination.  REPRODUCTIVE: Denies vaginal discharge, abnormal vaginal bleeding, lesions, pelvic pain  BREASTS: The patient performs breast self-examination and denies pain, lumps, or nipple discharge.   HEMATOLOGIC: No easy bruisability or excessive bleeding.  MUSCULOSKELETAL: Denies joint pain or swelling.   NEUROLOGIC: Denies syncope or weakness.   PSYCHIATRIC: Denies depression, anxiety or mood swings.      OBJECTIVE:   Wt 75.3 kg (166 lb 0.1 oz)   BMI 26.79 kg/m²   The patient appears well, alert, oriented x 3, in no distress.  PSYCH:  Normal, full range of affect  NECK: negative, no thyromegaly, trachea midline  SKIN: normal, good color, good turgor and no acne, striae, hirsutism  BREAST EXAM: breasts appear normal, no suspicious masses, no skin or nipple changes or axillary nodes  ABDOMEN: soft, non-tender; bowel sounds normal; no masses,  no organomegaly and no hernias, masses, or hepatosplenomegaly  GENITALIA: normal external genitalia, no erythema, no discharge  BLADDER: soft  URETHRA: normal appearing urethra with no masses, tenderness or lesions and normal urethra, normal urethral meatus  VAGINA: vaginal atrophy. Vaginal cuff without lesions  CERVIX: absent  UTERUS: absent  ADNEXA: no mass, fullness, tenderness      ASSESSMENT:     1. Health maintenance  -pap not indicated  -screening MMG UTD  -colonoscopy UTD  -counseled on exercise and healthy diet,   -bone health:  Discussed Vitamin D and Calcium supplementation, weight bearing exercises  2.  Discussed safety at home/school/work, healthy and balanced diet, sleep hygiene, as well as violence/weapons exposure.   3.  rtc every 2 years

## 2022-10-12 ENCOUNTER — TELEPHONE (OUTPATIENT)
Dept: OBSTETRICS AND GYNECOLOGY | Facility: CLINIC | Age: 62
End: 2022-10-12
Payer: COMMERCIAL

## 2022-10-12 NOTE — TELEPHONE ENCOUNTER
----- Message from Le Nick sent at 10/12/2022  2:52 PM CDT -----  Name of Caller pt   Reason for Visit/Symptoms pt requesting to be seen by dr cardenas only for left breast pain. Nothing available until 11/30/22. Call pt   Best Contact Number or Confirm if Mychart Preferred [phone] (home)     Preferred Date/Time of Appointment asap   Interested in Virtual Visit (yes/no)  Additional Information

## 2022-10-31 ENCOUNTER — OFFICE VISIT (OUTPATIENT)
Dept: OBSTETRICS AND GYNECOLOGY | Facility: CLINIC | Age: 62
End: 2022-10-31
Payer: COMMERCIAL

## 2022-10-31 VITALS
WEIGHT: 168.44 LBS | BODY MASS INDEX: 27.19 KG/M2 | DIASTOLIC BLOOD PRESSURE: 78 MMHG | SYSTOLIC BLOOD PRESSURE: 138 MMHG

## 2022-10-31 DIAGNOSIS — N64.4 BREAST PAIN: Primary | ICD-10-CM

## 2022-10-31 PROCEDURE — 99213 PR OFFICE/OUTPT VISIT, EST, LEVL III, 20-29 MIN: ICD-10-PCS | Mod: S$GLB,,, | Performed by: OBSTETRICS & GYNECOLOGY

## 2022-10-31 PROCEDURE — 3075F PR MOST RECENT SYSTOLIC BLOOD PRESS GE 130-139MM HG: ICD-10-PCS | Mod: CPTII,S$GLB,, | Performed by: OBSTETRICS & GYNECOLOGY

## 2022-10-31 PROCEDURE — 99213 OFFICE O/P EST LOW 20 MIN: CPT | Mod: S$GLB,,, | Performed by: OBSTETRICS & GYNECOLOGY

## 2022-10-31 PROCEDURE — 1159F MED LIST DOCD IN RCRD: CPT | Mod: CPTII,S$GLB,, | Performed by: OBSTETRICS & GYNECOLOGY

## 2022-10-31 PROCEDURE — 3075F SYST BP GE 130 - 139MM HG: CPT | Mod: CPTII,S$GLB,, | Performed by: OBSTETRICS & GYNECOLOGY

## 2022-10-31 PROCEDURE — 3044F HG A1C LEVEL LT 7.0%: CPT | Mod: CPTII,S$GLB,, | Performed by: OBSTETRICS & GYNECOLOGY

## 2022-10-31 PROCEDURE — 3078F PR MOST RECENT DIASTOLIC BLOOD PRESSURE < 80 MM HG: ICD-10-PCS | Mod: CPTII,S$GLB,, | Performed by: OBSTETRICS & GYNECOLOGY

## 2022-10-31 PROCEDURE — 3044F PR MOST RECENT HEMOGLOBIN A1C LEVEL <7.0%: ICD-10-PCS | Mod: CPTII,S$GLB,, | Performed by: OBSTETRICS & GYNECOLOGY

## 2022-10-31 PROCEDURE — 1159F PR MEDICATION LIST DOCUMENTED IN MEDICAL RECORD: ICD-10-PCS | Mod: CPTII,S$GLB,, | Performed by: OBSTETRICS & GYNECOLOGY

## 2022-10-31 PROCEDURE — 3078F DIAST BP <80 MM HG: CPT | Mod: CPTII,S$GLB,, | Performed by: OBSTETRICS & GYNECOLOGY

## 2022-10-31 PROCEDURE — 99999 PR PBB SHADOW E&M-EST. PATIENT-LVL III: ICD-10-PCS | Mod: PBBFAC,,, | Performed by: OBSTETRICS & GYNECOLOGY

## 2022-10-31 PROCEDURE — 99999 PR PBB SHADOW E&M-EST. PATIENT-LVL III: CPT | Mod: PBBFAC,,, | Performed by: OBSTETRICS & GYNECOLOGY

## 2022-10-31 NOTE — PROGRESS NOTES
SUBJECTIVE:   62 y.o. female   for left breast pain    No LMP recorded. Patient has had a hysterectomy..  .    Pt reported left sided breast pain x 2 weeks, mostly around the upper/outer quadrant.  Pt with with h/o left breast cyst removal about 10-15 years ago    Screening MMG in 2022 NEG    OB History    Para Term  AB Living   2 2 2         SAB IAB Ectopic Multiple Live Births                  # Outcome Date GA Lbr Morales/2nd Weight Sex Delivery Anes PTL Lv   2 Term            1 Term               Obstetric Comments   S/p TVH for aub/fibroid @ age 33, denies HRT   Denies abnl pap, Pap  NEG   MMG 2022 NEG     Past Medical History:   Diagnosis Date    Anemia 2015    Coronary artery disease     GERD (gastroesophageal reflux disease)     Hyperlipidemia     Hypertension     Microcytic anemia 2015    Positive PPD 2015    Vitamin D deficiency disease 2015     Past Surgical History:   Procedure Laterality Date    BREAST BIOPSY      BREAST CYST EXCISION      CATARACT EXTRACTION, BILATERAL Bilateral 2019    COLONOSCOPY N/A 6/15/2018    Procedure: COLONOSCOPY;  Surgeon: Mikael Wilde MD;  Location: [facility];  Service: Endoscopy;  Laterality: N/A;  confirmed-ss    HYSTERECTOMY      PARTIAL HYSTERECTOMY      over 21 years ago     Social History     Socioeconomic History    Marital status:    Occupational History    Occupation: PA asst     Employer: [employer]   Tobacco Use    Smoking status: Never    Smokeless tobacco: Never   Substance and Sexual Activity    Alcohol use: Yes     Alcohol/week: 1.0 standard drink     Types: 1 Glasses of wine per week     Comment: occasional drinking    Drug use: No     Family History   Problem Relation Age of Onset    Heart disease Father     Hyperlipidemia Father     Hyperlipidemia Mother     Kidney disease Mother     Cancer Brother         pancreas    Heart disease Brother     Breast cancer Neg Hx     Colon cancer Neg Hx      Ovarian cancer Neg Hx          Current Outpatient Medications   Medication Sig Dispense Refill    acyclovir (ZOVIRAX) 400 MG tablet Take 1 tablet (400 mg total) by mouth 3 (three) times daily as needed. 30 tablet 11    aspirin 81 MG Chew Take 81 mg by mouth once daily.      atorvastatin (LIPITOR) 20 MG tablet Take 1 tablet (20 mg total) by mouth once daily. 90 tablet 12    azelastine (ASTELIN) 137 mcg (0.1 %) nasal spray 1 spray (137 mcg total) by Nasal route 2 (two) times daily. 30 mL 0    cetirizine (ZYRTEC) 10 MG tablet Take 10 mg by mouth once daily.      chlorpheniramine (CHLOR-TRIMETON) 4 mg tablet Take 4 mg by mouth every 6 (six) hours as needed for Allergies.      ergocalciferol (ERGOCALCIFEROL) 50,000 unit Cap Take 1 capsule by mouth once a week 12 capsule 12    fluticasone (FLONASE) 50 mcg/actuation nasal spray 2 sprays by Each Nare route once daily. 16 g 0    hydroCHLOROthiazide (HYDRODIURIL) 25 MG tablet Take 1 tablet (25 mg total) by mouth once daily. 90 tablet 12    hydrocortisone 2.5 % cream Apply topically 2 (two) times daily. for 7 days 28 g 0    hydrOXYzine HCl (ATARAX) 25 MG tablet Take 25 mg by mouth.      metoprolol succinate (TOPROL-XL) 25 MG 24 hr tablet TAKE 1 TABLET BY MOUTH ONCE DAILY DUE FOR CHECKUP IN NEAR FUTURE LAST SEEN 2020 90 tablet 12    nystatin (MYCOSTATIN) cream Apply topically 2 (two) times daily. Apply to affected area BID 30 g 3    omeprazole (PRILOSEC) 20 MG capsule Take 1 capsule (20 mg total) by mouth once daily. 90 capsule 12     No current facility-administered medications for this visit.     Allergies: Patient has no known allergies.       ROS:  GENERAL: Denies weight gain or weight loss. Feeling well overall.   SKIN: Denies rash or lesions.   HEAD: Denies head injury or headache.   NODES: Denies enlarged lymph nodes.   CHEST: Denies chest pain or shortness of breath.   CARDIOVASCULAR: Denies palpitations or left sided chest pain.   ABDOMEN: No abdominal pain,  constipation, diarrhea, nausea, vomiting or rectal bleeding.   URINARY: No frequency, dysuria, hematuria, or burning on urination.  REPRODUCTIVE: Denies vaginal discharge, abnormal vaginal bleeding, lesions, pelvic pain  BREASTS: The patient performs breast self-examination and denies pain, lumps, or nipple discharge.   HEMATOLOGIC: No easy bruisability or excessive bleeding.  MUSCULOSKELETAL: Denies joint pain or swelling.   NEUROLOGIC: Denies syncope or weakness.   PSYCHIATRIC: Denies depression, anxiety or mood swings.      OBJECTIVE:   /78   Wt 76.4 kg (168 lb 6.9 oz)   BMI 27.19 kg/m²   The patient appears well, alert, oriented x 3, in no distress.  NECK: negative, no thyromegaly, trachea midline  SKIN: normal, good color, good turgor, and no acne, striae, hirsutism  BREAST EXAM: breasts appear normal, no suspicious masses, no skin or nipple changes or axillary nodes, symmetric fibrous changes in both upper outer quadrants  ABDOMEN: soft, non-tender; bowel sounds normal; no masses,  no organomegaly and no hernias, masses, or hepatosplenomegaly        ASSESSMENT:   1.Mastalgia:  Check breast US/diagnostic MMG.  Discussed caffeine sessation, supportive bras and Vit E supplements.

## 2023-01-04 ENCOUNTER — OFFICE VISIT (OUTPATIENT)
Dept: FAMILY MEDICINE | Facility: CLINIC | Age: 63
End: 2023-01-04
Payer: COMMERCIAL

## 2023-01-04 VITALS
TEMPERATURE: 99 F | SYSTOLIC BLOOD PRESSURE: 128 MMHG | BODY MASS INDEX: 27.07 KG/M2 | WEIGHT: 168.44 LBS | HEIGHT: 66 IN | HEART RATE: 106 BPM | DIASTOLIC BLOOD PRESSURE: 68 MMHG | OXYGEN SATURATION: 96 %

## 2023-01-04 DIAGNOSIS — R05.9 COUGH, UNSPECIFIED TYPE: ICD-10-CM

## 2023-01-04 DIAGNOSIS — R06.2 WHEEZING: ICD-10-CM

## 2023-01-04 DIAGNOSIS — I10 ESSENTIAL HYPERTENSION: ICD-10-CM

## 2023-01-04 DIAGNOSIS — J40 BRONCHITIS: Primary | ICD-10-CM

## 2023-01-04 PROCEDURE — 1159F MED LIST DOCD IN RCRD: CPT | Mod: CPTII,S$GLB,, | Performed by: NURSE PRACTITIONER

## 2023-01-04 PROCEDURE — 1160F PR REVIEW ALL MEDS BY PRESCRIBER/CLIN PHARMACIST DOCUMENTED: ICD-10-PCS | Mod: CPTII,S$GLB,, | Performed by: NURSE PRACTITIONER

## 2023-01-04 PROCEDURE — 99999 PR PBB SHADOW E&M-EST. PATIENT-LVL IV: CPT | Mod: PBBFAC,,, | Performed by: NURSE PRACTITIONER

## 2023-01-04 PROCEDURE — 3074F SYST BP LT 130 MM HG: CPT | Mod: CPTII,S$GLB,, | Performed by: NURSE PRACTITIONER

## 2023-01-04 PROCEDURE — 3078F DIAST BP <80 MM HG: CPT | Mod: CPTII,S$GLB,, | Performed by: NURSE PRACTITIONER

## 2023-01-04 PROCEDURE — 3074F PR MOST RECENT SYSTOLIC BLOOD PRESSURE < 130 MM HG: ICD-10-PCS | Mod: CPTII,S$GLB,, | Performed by: NURSE PRACTITIONER

## 2023-01-04 PROCEDURE — 3008F PR BODY MASS INDEX (BMI) DOCUMENTED: ICD-10-PCS | Mod: CPTII,S$GLB,, | Performed by: NURSE PRACTITIONER

## 2023-01-04 PROCEDURE — 1159F PR MEDICATION LIST DOCUMENTED IN MEDICAL RECORD: ICD-10-PCS | Mod: CPTII,S$GLB,, | Performed by: NURSE PRACTITIONER

## 2023-01-04 PROCEDURE — 99214 OFFICE O/P EST MOD 30 MIN: CPT | Mod: S$GLB,,, | Performed by: NURSE PRACTITIONER

## 2023-01-04 PROCEDURE — 3008F BODY MASS INDEX DOCD: CPT | Mod: CPTII,S$GLB,, | Performed by: NURSE PRACTITIONER

## 2023-01-04 PROCEDURE — U0005 INFEC AGEN DETEC AMPLI PROBE: HCPCS | Performed by: NURSE PRACTITIONER

## 2023-01-04 PROCEDURE — 99214 PR OFFICE/OUTPT VISIT, EST, LEVL IV, 30-39 MIN: ICD-10-PCS | Mod: S$GLB,,, | Performed by: NURSE PRACTITIONER

## 2023-01-04 PROCEDURE — 99999 PR PBB SHADOW E&M-EST. PATIENT-LVL IV: ICD-10-PCS | Mod: PBBFAC,,, | Performed by: NURSE PRACTITIONER

## 2023-01-04 PROCEDURE — 3078F PR MOST RECENT DIASTOLIC BLOOD PRESSURE < 80 MM HG: ICD-10-PCS | Mod: CPTII,S$GLB,, | Performed by: NURSE PRACTITIONER

## 2023-01-04 PROCEDURE — 1160F RVW MEDS BY RX/DR IN RCRD: CPT | Mod: CPTII,S$GLB,, | Performed by: NURSE PRACTITIONER

## 2023-01-04 RX ORDER — PROMETHAZINE HYDROCHLORIDE AND DEXTROMETHORPHAN HYDROBROMIDE 6.25; 15 MG/5ML; MG/5ML
5 SYRUP ORAL EVERY 4 HOURS PRN
Qty: 118 ML | Refills: 0 | Status: SHIPPED | OUTPATIENT
Start: 2023-01-04 | End: 2023-01-14

## 2023-01-04 RX ORDER — METHYLPREDNISOLONE 4 MG/1
TABLET ORAL
Qty: 21 EACH | Refills: 0 | Status: SHIPPED | OUTPATIENT
Start: 2023-01-04 | End: 2023-01-25

## 2023-01-04 RX ORDER — ALBUTEROL SULFATE 90 UG/1
2 AEROSOL, METERED RESPIRATORY (INHALATION) EVERY 6 HOURS PRN
Qty: 18 G | Refills: 0 | Status: SHIPPED | OUTPATIENT
Start: 2023-01-04 | End: 2024-01-04

## 2023-01-04 NOTE — PROGRESS NOTES
Chief Complaint  Chief Complaint   Patient presents with    Sinus Problem    Nasal Congestion     Congestion in Chest       HPI    HPI   Ms. iLnh Clifton is a 62 y.o. female with medical problems as listed below. The patient presents to clinic with c/o sinus, sore throat and nasal congestion. She also feels like she has congestion in her chest.     She states she test negative yesterday for COVID with POCT testing. She would still like to get tested today for COVID.     She has been taking coricidin and flonase with no relief.     PAST MEDICAL HISTORY:  Past Medical History:   Diagnosis Date    Anemia 4/16/2015    Coronary artery disease     GERD (gastroesophageal reflux disease)     Hyperlipidemia     Hypertension     Microcytic anemia 4/16/2015    Positive PPD 4/16/2015    Vitamin D deficiency disease 4/16/2015       PAST SURGICAL HISTORY:  Past Surgical History:   Procedure Laterality Date    BREAST BIOPSY      BREAST CYST EXCISION      CATARACT EXTRACTION, BILATERAL Bilateral 02/2019    COLONOSCOPY N/A 6/15/2018    Procedure: COLONOSCOPY;  Surgeon: Mikael Wilde MD;  Location: [facility];  Service: Endoscopy;  Laterality: N/A;  confirmed-ss    HYSTERECTOMY      PARTIAL HYSTERECTOMY      over 21 years ago       SOCIAL HISTORY:  Social History     Socioeconomic History    Marital status:    Occupational History    Occupation: PA asst     Employer: [employer]   Tobacco Use    Smoking status: Never    Smokeless tobacco: Never   Substance and Sexual Activity    Alcohol use: Yes     Alcohol/week: 1.0 standard drink     Types: 1 Glasses of wine per week     Comment: occasional drinking    Drug use: No       FAMILY HISTORY:  Family History   Problem Relation Age of Onset    Heart disease Father     Hyperlipidemia Father     Hyperlipidemia Mother     Kidney disease Mother     Cancer Brother         pancreas    Heart disease Brother     Breast cancer Neg Hx     Colon cancer Neg Hx     Ovarian cancer  Neg Hx        ALLERGIES AND MEDICATIONS: updated and reviewed.  Review of patient's allergies indicates:  No Known Allergies  Current Outpatient Medications   Medication Sig Dispense Refill    acyclovir (ZOVIRAX) 400 MG tablet Take 1 tablet (400 mg total) by mouth 3 (three) times daily as needed. 30 tablet 11    albuterol (VENTOLIN HFA) 90 mcg/actuation inhaler Inhale 2 puffs into the lungs every 6 (six) hours as needed for Wheezing. Rescue 18 g 0    aspirin 81 MG Chew Take 81 mg by mouth once daily.      atorvastatin (LIPITOR) 20 MG tablet Take 1 tablet (20 mg total) by mouth once daily. 90 tablet 12    azelastine (ASTELIN) 137 mcg (0.1 %) nasal spray 1 spray (137 mcg total) by Nasal route 2 (two) times daily. 30 mL 0    cetirizine (ZYRTEC) 10 MG tablet Take 10 mg by mouth once daily.      chlorpheniramine (CHLOR-TRIMETON) 4 mg tablet Take 4 mg by mouth every 6 (six) hours as needed for Allergies.      ergocalciferol (ERGOCALCIFEROL) 50,000 unit Cap Take 1 capsule by mouth once a week 12 capsule 12    fluticasone (FLONASE) 50 mcg/actuation nasal spray 2 sprays by Each Nare route once daily. 16 g 0    hydroCHLOROthiazide (HYDRODIURIL) 25 MG tablet Take 1 tablet (25 mg total) by mouth once daily. 90 tablet 12    hydrocortisone 2.5 % cream Apply topically 2 (two) times daily. for 7 days 28 g 0    hydrOXYzine HCl (ATARAX) 25 MG tablet Take 25 mg by mouth.      methylPREDNISolone (MEDROL DOSEPACK) 4 mg tablet use as directed 21 each 0    metoprolol succinate (TOPROL-XL) 25 MG 24 hr tablet TAKE 1 TABLET BY MOUTH ONCE DAILY DUE FOR CHECKUP IN NEAR FUTURE LAST SEEN 2020 90 tablet 12    nystatin (MYCOSTATIN) cream Apply topically 2 (two) times daily. Apply to affected area BID 30 g 3    omeprazole (PRILOSEC) 20 MG capsule Take 1 capsule (20 mg total) by mouth once daily. 90 capsule 12    promethazine-dextromethorphan (PROMETHAZINE-DM) 6.25-15 mg/5 mL Syrp Take 5 mLs by mouth every 4 (four) hours as needed (cough). 118 mL 0  "    No current facility-administered medications for this visit.       Patient Care Team:  Feliciano Chandler MD as PCP - General (Internal Medicine)    ROS  Review of Systems   Constitutional:  Negative for chills, fatigue, fever and unexpected weight change.   HENT:  Positive for ear pain, sinus pressure and sinus pain. Negative for congestion, ear discharge and postnasal drip.    Eyes:  Negative for photophobia, pain and visual disturbance.   Respiratory:  Negative for cough, shortness of breath and wheezing.    Cardiovascular:  Negative for chest pain, palpitations and leg swelling.   Gastrointestinal:  Negative for abdominal pain, constipation, diarrhea, nausea and vomiting.   Genitourinary:  Negative for dysuria, frequency, urgency and vaginal discharge.   Musculoskeletal:  Negative for back pain, joint swelling and neck stiffness.   Skin:  Negative for rash.   Neurological:  Negative for weakness and headaches.   Psychiatric/Behavioral:  Negative for dysphoric mood and sleep disturbance. The patient is not nervous/anxious.          Physical Exam  Vitals:    01/04/23 1353   BP: 128/68   BP Location: Left arm   Patient Position: Sitting   BP Method: Large (Manual)   Pulse: 106   Temp: 98.6 °F (37 °C)   TempSrc: Oral   SpO2: 96%   Weight: 76.4 kg (168 lb 6.9 oz)   Height: 5' 6" (1.676 m)    Body mass index is 27.19 kg/m².  Weight: 76.4 kg (168 lb 6.9 oz)   Height: 5' 6" (167.6 cm)   Physical Exam  Constitutional:       Appearance: She is well-developed.   HENT:      Head: Normocephalic and atraumatic.      Salivary Glands: Right salivary gland is diffusely enlarged and tender. Left salivary gland is diffusely enlarged and tender.      Right Ear: External ear normal. A middle ear effusion is present.      Left Ear: External ear normal. A middle ear effusion is present. Tympanic membrane is injected.      Nose: Nose normal.      Right Sinus: No maxillary sinus tenderness or frontal sinus tenderness.      Left " Sinus: No maxillary sinus tenderness or frontal sinus tenderness.      Mouth/Throat:      Pharynx: Posterior oropharyngeal erythema present.   Eyes:      Extraocular Movements: Extraocular movements intact.   Cardiovascular:      Rate and Rhythm: Normal rate.   Pulmonary:      Effort: Pulmonary effort is normal.   Musculoskeletal:         General: Normal range of motion.      Cervical back: Normal range of motion and neck supple.   Skin:     General: Skin is warm and dry.   Neurological:      Mental Status: She is alert and oriented to person, place, and time.   Psychiatric:         Behavior: Behavior normal.           Health Maintenance         Date Due Completion Date    Hepatitis C Screening Never done ---    HIV Screening Never done ---    Shingles Vaccine (1 of 2) Never done ---    COVID-19 Vaccine (3 - Booster for Moderna series) 06/03/2021 4/8/2021    Mammogram 05/03/2023 5/3/2022    Override on 11/6/2013: Done    Colorectal Cancer Screening 06/15/2023 10/1/2020    Override on 11/12/2008: Done    High Dose Statin 06/23/2023 6/23/2022    Aspirin/Antiplatelet Therapy 06/23/2023 6/23/2022    Lipid Panel 06/23/2027 6/23/2022    TETANUS VACCINE 10/17/2027 10/17/2017    Override on 10/17/2017: Done          Health maintenance reviewed at this time.     Assessment & Plan  Bronchitis  Antihistamine and Flonase   Warm salt water rinses  Mucinex   Rest and lots of fluids.   Will retest for COVID and report the results to the patient.    Wheezing  -     albuterol (VENTOLIN HFA) 90 mcg/actuation inhaler; Inhale 2 puffs into the lungs every 6 (six) hours as needed for Wheezing. Rescue  Dispense: 18 g; Refill: 0  -     methylPREDNISolone (MEDROL DOSEPACK) 4 mg tablet; use as directed  Dispense: 21 each; Refill: 0    Cough, unspecified type  -     promethazine-dextromethorphan (PROMETHAZINE-DM) 6.25-15 mg/5 mL Syrp; Take 5 mLs by mouth every 4 (four) hours as needed (cough).  Dispense: 118 mL; Refill: 0  -     COVID-19  Routine Screening; Future; Expected date: 01/04/2023    Essential hypertension  The current medical regimen is effective;  continue present plan and medications.              Follow-up: Follow up if symptoms worsen or fail to improve.

## 2023-01-05 ENCOUNTER — TELEPHONE (OUTPATIENT)
Dept: FAMILY MEDICINE | Facility: CLINIC | Age: 63
End: 2023-01-05

## 2023-01-05 LAB
SARS-COV-2 RNA RESP QL NAA+PROBE: NOT DETECTED
SARS-COV-2- CYCLE NUMBER: NORMAL

## 2023-01-05 NOTE — TELEPHONE ENCOUNTER
----- Message from Lorena Viveros NP sent at 1/5/2023  3:16 PM CST -----  Please call patient with their attached lab results.     Her COVID was negative.    Thanks  Lorena

## 2023-01-11 ENCOUNTER — TELEPHONE (OUTPATIENT)
Dept: FAMILY MEDICINE | Facility: CLINIC | Age: 63
End: 2023-01-11

## 2023-01-11 DIAGNOSIS — R05.9 COUGH, UNSPECIFIED TYPE: ICD-10-CM

## 2023-01-11 DIAGNOSIS — J40 BRONCHITIS: Primary | ICD-10-CM

## 2023-01-11 RX ORDER — AZITHROMYCIN 250 MG/1
TABLET, FILM COATED ORAL
Qty: 6 TABLET | Refills: 0 | Status: SHIPPED | OUTPATIENT
Start: 2023-01-11 | End: 2023-01-16

## 2023-01-11 NOTE — TELEPHONE ENCOUNTER
Spoke with patient she states that she was giving 3 rx last Thursday and has almost finished most of the medication. Patient says blowing nose, constant cough and when she takes a deep breath she is still having pain underneath her breast. Patient says she's upset that she spent almost $80 but is still having these symptoms. Patient is asking what her other option should be.

## 2023-01-11 NOTE — TELEPHONE ENCOUNTER
Im sorry she feels that way. Realistically bronchitis which is what she has can take WEEKS to clear up. Cough can last for weeks. I told her at the visit that she can contact me if her symptoms failed to improve and we could discuss next steps. Initially when symptoms start we do not go to an antibiotic right away. Since she still has all of the symptoms I will send in antibiotic to her pharmacy. Even with the antibiotic it can take a while for all the symptoms to go away. I hope she feels better.

## 2023-01-11 NOTE — TELEPHONE ENCOUNTER
----- Message from Marlena Dickson sent at 1/11/2023  9:41 AM CST -----  Name of Who is Calling:PRAVIN SPAIN [[id number]]              What is the request in detail:Requesting a call back to be seen as soon possible. Patient states that she has completed all medications and still having the same symptoms. Please assist.               Can the clinic reply by [facility]:no              [facility]:[phone]

## 2023-01-26 ENCOUNTER — NURSE TRIAGE (OUTPATIENT)
Dept: ADMINISTRATIVE | Facility: CLINIC | Age: 63
End: 2023-01-26

## 2023-01-26 NOTE — TELEPHONE ENCOUNTER
Tried to call patient to check on her to see if she is doing ok. Patient c/o wheezing at night. Patient didn't answer the phone but lvm.

## 2023-01-26 NOTE — TELEPHONE ENCOUNTER
Pt stated that she was seen on 1/4/23. Pt completed ABT. C/o coughing and wheezing at night only. Pt denies symptoms at this time. Pt states that she feels fine now. Care advise to receive call back today per protocol. Encounter routed to provider.      Reason for Disposition   Nursing judgment    Protocols used: Information Only Call - No Triage-A-[facility]

## 2023-05-10 ENCOUNTER — HOSPITAL ENCOUNTER (OUTPATIENT)
Dept: RADIOLOGY | Facility: HOSPITAL | Age: 63
Discharge: HOME | End: 2023-05-10
Attending: OBSTETRICS & GYNECOLOGY
Payer: MEDICARE

## 2023-05-10 DIAGNOSIS — N64.4 BREAST PAIN: ICD-10-CM

## 2023-05-10 PROCEDURE — 76642 ULTRASOUND BREAST LIMITED: CPT | Mod: 26,LT,, | Performed by: RADIOLOGY

## 2023-05-10 PROCEDURE — 77062 BREAST TOMOSYNTHESIS BI: CPT | Mod: 26,,, | Performed by: RADIOLOGY

## 2023-05-10 PROCEDURE — 76642 US BREAST LEFT LIMITED: ICD-10-PCS | Mod: 26,LT,, | Performed by: RADIOLOGY

## 2023-05-10 PROCEDURE — 77062 BREAST TOMOSYNTHESIS BI: CPT | Mod: TC

## 2023-05-10 PROCEDURE — 77066 DX MAMMO INCL CAD BI: CPT | Mod: 26,,, | Performed by: RADIOLOGY

## 2023-05-10 PROCEDURE — 77062 MAMMO DIGITAL DIAGNOSTIC BILAT WITH TOMO: ICD-10-PCS | Mod: 26,,, | Performed by: RADIOLOGY

## 2023-05-10 PROCEDURE — 76642 ULTRASOUND BREAST LIMITED: CPT | Mod: TC,LT

## 2023-05-10 PROCEDURE — 77066 MAMMO DIGITAL DIAGNOSTIC BILAT WITH TOMO: ICD-10-PCS | Mod: 26,,, | Performed by: RADIOLOGY

## 2023-06-14 DIAGNOSIS — Z78.0 MENOPAUSE: ICD-10-CM

## 2023-07-12 DIAGNOSIS — I10 HYPERTENSION: ICD-10-CM

## 2023-07-25 ENCOUNTER — OFFICE VISIT (OUTPATIENT)
Dept: FAMILY MEDICINE | Facility: CLINIC | Age: 63
End: 2023-07-25
Payer: MEDICARE

## 2023-07-25 VITALS
TEMPERATURE: 98 F | SYSTOLIC BLOOD PRESSURE: 126 MMHG | WEIGHT: 166.75 LBS | BODY MASS INDEX: 26.92 KG/M2 | DIASTOLIC BLOOD PRESSURE: 70 MMHG | OXYGEN SATURATION: 98 % | HEART RATE: 80 BPM

## 2023-07-25 DIAGNOSIS — R05.1 ACUTE COUGH: ICD-10-CM

## 2023-07-25 DIAGNOSIS — Z12.11 COLON CANCER SCREENING: ICD-10-CM

## 2023-07-25 DIAGNOSIS — D64.9 ANEMIA, UNSPECIFIED TYPE: ICD-10-CM

## 2023-07-25 DIAGNOSIS — Z23 NEED FOR SHINGLES VACCINE: ICD-10-CM

## 2023-07-25 DIAGNOSIS — U07.1 COVID-19 VIRUS DETECTED: ICD-10-CM

## 2023-07-25 DIAGNOSIS — J06.9 UPPER RESPIRATORY TRACT INFECTION, UNSPECIFIED TYPE: Primary | ICD-10-CM

## 2023-07-25 DIAGNOSIS — I10 PRIMARY HYPERTENSION: Chronic | ICD-10-CM

## 2023-07-25 DIAGNOSIS — E78.5 HYPERLIPIDEMIA, UNSPECIFIED HYPERLIPIDEMIA TYPE: ICD-10-CM

## 2023-07-25 LAB — SARS-COV-2 RNA RESP QL NAA+PROBE: DETECTED

## 2023-07-25 PROCEDURE — 3078F DIAST BP <80 MM HG: CPT | Mod: HCNC,CPTII,S$GLB, | Performed by: NURSE PRACTITIONER

## 2023-07-25 PROCEDURE — 3288F FALL RISK ASSESSMENT DOCD: CPT | Mod: HCNC,CPTII,S$GLB, | Performed by: NURSE PRACTITIONER

## 2023-07-25 PROCEDURE — 1101F PT FALLS ASSESS-DOCD LE1/YR: CPT | Mod: HCNC,CPTII,S$GLB, | Performed by: NURSE PRACTITIONER

## 2023-07-25 PROCEDURE — 3008F PR BODY MASS INDEX (BMI) DOCUMENTED: ICD-10-PCS | Mod: HCNC,CPTII,S$GLB, | Performed by: NURSE PRACTITIONER

## 2023-07-25 PROCEDURE — 99214 OFFICE O/P EST MOD 30 MIN: CPT | Mod: HCNC,S$GLB,, | Performed by: NURSE PRACTITIONER

## 2023-07-25 PROCEDURE — 99999 PR PBB SHADOW E&M-EST. PATIENT-LVL V: CPT | Mod: PBBFAC,HCNC,, | Performed by: NURSE PRACTITIONER

## 2023-07-25 PROCEDURE — 3074F SYST BP LT 130 MM HG: CPT | Mod: HCNC,CPTII,S$GLB, | Performed by: NURSE PRACTITIONER

## 2023-07-25 PROCEDURE — 3074F PR MOST RECENT SYSTOLIC BLOOD PRESSURE < 130 MM HG: ICD-10-PCS | Mod: HCNC,CPTII,S$GLB, | Performed by: NURSE PRACTITIONER

## 2023-07-25 PROCEDURE — 1159F PR MEDICATION LIST DOCUMENTED IN MEDICAL RECORD: ICD-10-PCS | Mod: HCNC,CPTII,S$GLB, | Performed by: NURSE PRACTITIONER

## 2023-07-25 PROCEDURE — 3288F PR FALLS RISK ASSESSMENT DOCUMENTED: ICD-10-PCS | Mod: HCNC,CPTII,S$GLB, | Performed by: NURSE PRACTITIONER

## 2023-07-25 PROCEDURE — 1101F PR PT FALLS ASSESS DOC 0-1 FALLS W/OUT INJ PAST YR: ICD-10-PCS | Mod: HCNC,CPTII,S$GLB, | Performed by: NURSE PRACTITIONER

## 2023-07-25 PROCEDURE — 1159F MED LIST DOCD IN RCRD: CPT | Mod: HCNC,CPTII,S$GLB, | Performed by: NURSE PRACTITIONER

## 2023-07-25 PROCEDURE — 3078F PR MOST RECENT DIASTOLIC BLOOD PRESSURE < 80 MM HG: ICD-10-PCS | Mod: HCNC,CPTII,S$GLB, | Performed by: NURSE PRACTITIONER

## 2023-07-25 PROCEDURE — 87635 SARS-COV-2 COVID-19 AMP PRB: CPT | Mod: HCNC | Performed by: NURSE PRACTITIONER

## 2023-07-25 PROCEDURE — 3008F BODY MASS INDEX DOCD: CPT | Mod: HCNC,CPTII,S$GLB, | Performed by: NURSE PRACTITIONER

## 2023-07-25 PROCEDURE — 99214 PR OFFICE/OUTPT VISIT, EST, LEVL IV, 30-39 MIN: ICD-10-PCS | Mod: HCNC,S$GLB,, | Performed by: NURSE PRACTITIONER

## 2023-07-25 PROCEDURE — 99999 PR PBB SHADOW E&M-EST. PATIENT-LVL V: ICD-10-PCS | Mod: PBBFAC,HCNC,, | Performed by: NURSE PRACTITIONER

## 2023-07-25 RX ORDER — SALICYLIC ACID 40 %
ADHESIVE PATCH, MEDICATED TOPICAL
Qty: 1 EACH | Refills: 0 | Status: SHIPPED | OUTPATIENT
Start: 2023-07-25

## 2023-07-25 RX ORDER — PROMETHAZINE HYDROCHLORIDE AND DEXTROMETHORPHAN HYDROBROMIDE 6.25; 15 MG/5ML; MG/5ML
5 SYRUP ORAL EVERY 8 HOURS PRN
Qty: 240 ML | Refills: 0 | Status: SHIPPED | OUTPATIENT
Start: 2023-07-25 | End: 2023-10-27

## 2023-07-25 RX ORDER — BENZONATATE 200 MG/1
200 CAPSULE ORAL 3 TIMES DAILY PRN
Qty: 15 CAPSULE | Refills: 0 | Status: SHIPPED | OUTPATIENT
Start: 2023-07-25

## 2023-07-25 RX ORDER — FLUTICASONE PROPIONATE 50 MCG
2 SPRAY, SUSPENSION (ML) NASAL DAILY
Qty: 11.1 ML | Refills: 0 | Status: SHIPPED | OUTPATIENT
Start: 2023-07-25

## 2023-07-25 RX ORDER — GUAIFENESIN 600 MG/1
600 TABLET, EXTENDED RELEASE ORAL 2 TIMES DAILY PRN
Qty: 20 TABLET | Refills: 0 | Status: SHIPPED | OUTPATIENT
Start: 2023-07-25

## 2023-07-25 NOTE — PATIENT INSTRUCTIONS
Medical Fitness--[phone]  Imaging, Xray, CT, MRI, Ultrasound---[phone]  Bariatrics---[phone]  Breast Surgery---[phone]  Case Management---[phone]  Colonoscopy---[phone]  DME---[phone]  Infectious Disease---[phone]  Interventional Radiology---[phone]  Medical Records---[phone]  [facility] On [facility]---[phone]  Optometry/Ophthalmology---[phone]  [facility]---[phone]  Physical Therapy---[phone]  Psychiatry---[phone] or [phone]  Plastic Surgery---[phone]  Recovery--[phone] option 2, or [phone].  Sleep Study---[phone]  Smoking Cessation---[phone]  Wound Care---[phone]  Referral Desk---[phone]      Patient Education       Viral Upper Respiratory Infection Discharge Instructions, Adult   About this topic   You have an upper respiratory infection or URI. A URI can affect your nose, throat, ears, and sinuses. A virus is the cause of almost all URIs and antibiotics will not help you feel better more quickly. The common cold is an example of a viral URI.  URIs are easy to spread from person to person, most often through coughing or sneezing. A URI will almost always get better in a week or two without any treatment.         What care is needed at home?   Ask your doctor what you need to do when you go home. Make sure you ask questions if you do not understand what the doctor says.  If you smoke, try to quit. Your doctor or nurse can help.  Drink lots of fluids like water, juice, or broth. This will help replace any fluids lost if you have a runny nose or fever. Warm tea or soup can help soothe a sore throat.  If the air in your home feels dry, use a cool mist humidifier. This can help a stuffy nose and make it easier to breathe.  You can also use saline nose drops to relieve stuffiness.  If you decide to take over-the-counter cough or cold medicines, follow the directions on the label carefully. Be sure you do not take more  than 1 medicine that contains acetaminophen. Also, if you have a heart problem or high blood pressure, check with your doctor before you take any of these medicines.  Wash your hands often. Cough or sneeze into a tissue or your elbow instead of your hands. This will help keep others healthy.  What follow-up care is needed?   Your doctor may ask you to make visits to the office to check on your progress. Be sure to keep these visits.  What drugs may be needed?   The doctor may order drugs to:  Open up the tubes of your lungs  Treat viral infection  Relieve or stop coughing  Help with pain from a sore throat  Relieve runny and stuffy nose  Provide oxygen  Will physical activity be limited?   You need to rest for a few days to let your body recover from the infection.  What changes to diet are needed?   Eat soft foods like soup if swallowing is too painful.  What problems could happen?   Asthma attack  Sinus infections  Lung problems like pneumonia and bronchitis  Severe fluid loss. This is dehydration.  What can be done to prevent this health problem?   Wash your hands often with soap and water for at least 20 seconds, especially after coughing or sneezing. Alcohol-based hand sanitizers also work to kill the virus.  If you are sick, cover your mouth and nose with tissue when you cough or sneeze. You can also cough into your elbow. Throw away tissues in the trash and wash your hands after touching used tissues.  Do not get too close (kissing, hugging) to people who are sick.  Do not share towels or hankies with anyone who is sick. Clean commonly handled things like door handles, remotes, toys, and phones. Wipe them with a disinfectant.  Stay away from crowded places.  Cover your nose and mouth when you sneeze or cough.  Take vitamin C to help build up your body's ability to fight disease.  Get a flu shot each year.  When do I need to call the doctor?   You have trouble breathing when talking or sitting still.  You have  a fever of 100.4°F (38°C) or higher for several days, chills, a very bad sore throat, or ear or sinus pain.  You develop a new fever after several days of feeling the same or improving.  You develop chest pain when you cough.  You have a cough that lasts more than 10 days.  You cough up blood, or the color of the mucus you cough up changes.  Teach Back: Helping You Understand   The Teach Back Method helps you understand the information we are giving you. After you talk with the staff, tell them in your own words what you learned. This helps to make sure the staff has described each thing clearly. It also helps to explain things that may have been confusing. Before going home, make sure you can do these:  I can tell you about my condition.  I can tell you what may help ease my signs.  I can tell you what I will do if I have a fever, chills, breathing very fast, or trouble breathing.  Where can I learn more?   American Lung Association  https://www.lung.org/blog/can-you-exercise-with-a-cold   American Lung Association  https://www.lung.org/lung-health-diseases/lung-disease-lookup/influenza/facts-about-the-common-cold   NHS Choices  https://www.nhs.uk/conditions/respiratory-tract-infection/   UpToDate  https://www.uptodate.com/contents/the-common-cold-in-adults-beyond-the-basics   Last Reviewed Date   2021-06-08  Consumer Information Use and Disclaimer   This information is not specific medical advice and does not replace information you receive from your health care provider. This is only a brief summary of general information. It does NOT include all information about conditions, illnesses, injuries, tests, procedures, treatments, therapies, discharge instructions or life-style choices that may apply to you. You must talk with your health care provider for complete information about your health and treatment options. This information should not be used to decide whether or not to accept your health care providers  advice, instructions or recommendations. Only your health care provider has the knowledge and training to provide advice that is right for you.  Copyright   Copyright © 2021 UpToDate, Inc. and its affiliates and/or licensors. All rights reserved.  Patient Education       Cough, Runny Nose, and the Common Cold Discharge Instructions   About this topic   The common cold is an infection in the nose and throat. A tiny germ, called a virus, causes this infection. It often affects your nose, throat, ears, and sinuses. A cold can easily spread from person to person. Coughing, sneezing, or touching something with the germ on it spreads the cold.  Most colds go away on their own without treatment. Antibiotics will not help a cold. Your doctor may order drugs to help with the signs of a cold. Even though you may feel very sick, the common cold is not a health emergency.         What care is needed at home?   Ask your doctor what you need to do when you go home. Make sure you ask questions if you do not understand what the doctor says.  To help you feel better:  Use a cool mist humidifier to avoid breathing dry air.  Use hard candy or cough drops to soothe sore throat and cough.  Gargle with salt water. Mix 1/2 teaspoon (2.5 grams) salt with a cup (240 mL) of warm water a few times a day.  Spray saltwater mist in each nostril. Any normal saline spray works.  Sip warm liquids to keep your throat moist.  Take warm, steamy showers to help soothe the cough.  Do not smoke or be in smoke-filled places. Avoid things that may cause breathing problems like vaping, fumes, pollution, dust, and other common allergens.  You may want to use over-the-counter medicines for allergies or acid reflux if your cough is due to one of these problems.  You can also use an over-the-counter cough medicine.  Drink plenty of fluids whenever you are thirsty.  What follow-up care is needed?   Your doctor may ask you to make visits to the office to check on  your progress. Be sure to keep these visits.  What drugs may be needed?   Your doctor may order drugs to:  Help a stuffy nose  Lower a fever  Help with pain  Treat an allergy  Help with cough  Always talk to your doctor before you take any drugs. This includes over-the-counter (OTC) drugs and herbal supplements. This is very important if you have high blood pressure.  Will physical activity be limited?   Get lots of rest. Sleep when you are feeling tired. Avoid doing tiring activities.  What changes to diet are needed?   Chicken soup may be helpful. Warm fluids help thin mucus and help the body get rid of it easier.  What problems could happen?   Colds may cause signs of asthma for people who have asthma.  Sinus or ear infection  Lung infections  Bronchitis  What can be done to prevent this health problem?   Wash your hands often with soap and water for at least 20 seconds, especially after coughing or sneezing. Alcohol-based hand sanitizers also work to kill the virus.  If you are sick, cover your mouth and nose with tissue when you cough or sneeze. You can also cough into your elbow. Throw away tissues in the trash and wash your hands after touching used tissues.  Clean items and surfaces you most often touch like door handles, remotes, phones, or toys. Wipe them with a disinfectant. This can help reduce the spread of infection.  Do not get too close (kissing, hugging) to people who are sick.  Do not share towels or hankies with anyone who is sick.  Stay away from crowded places.  Keep your hands away from your eyes and nose because the virus can enter easily to those body areas.  Get a flu shot each year.  When do I need to call the doctor?   You have chest pain when you cough or trouble breathing.  You start to cough up blood or yellow or green mucus.  You have a fever of 100.4°F (38°C) or higher or chills.  You cough so hard you throw up.  You are still coughing in 10 days.  Helpful tips   It is normal that  mucus from your runny nose may become thicker and change color. Do not take an antibiotic. Instead, drink more water-based fluids, especially hot tea and soups.  Most colds go away within a week. If you are still sick after 14 days, see your doctor.  Teach Back: Helping You Understand   The Teach Back Method helps you understand the information we are giving you. After you talk with the staff, tell them in your own words what you learned. This helps to make sure the staff has described each thing clearly. It also helps to explain things that may have been confusing. Before going home, make sure you can do these:  I can tell you about my condition.  I can tell you what may help ease my breathing.  I can tell you what I can do to help avoid passing the infection to others.  I can tell you what I will do if I have a fever, chills, or trouble breathing.  Where can I learn more?   American Academy of Family Physicians  https://familydoctor.org/condition/colds-and-the-flu/   American Lung Association  http://www.lung.org/lung-health-and-diseases/lung-disease-lookup/influenza/facts-about-the-common-cold.html   Centers for Disease Control and Prevention  https://www.cdc.gov/features/rhinoviruses/   Kids Health  http://kidshealth.org/en/parents/cold.html?ref=search&WT.ac=nbp-k-lpbx-im-dbppnh-wnd   Last Reviewed Date   2021-06-09  Consumer Information Use and Disclaimer   This information is not specific medical advice and does not replace information you receive from your health care provider. This is only a brief summary of general information. It does NOT include all information about conditions, illnesses, injuries, tests, procedures, treatments, therapies, discharge instructions or life-style choices that may apply to you. You must talk with your health care provider for complete information about your health and treatment options. This information should not be used to decide whether or not to accept your health care  providers advice, instructions or recommendations. Only your health care provider has the knowledge and training to provide advice that is right for you.  Copyright   Copyright © 2021 UpToDate, Inc. and its affiliates and/or licensors. All rights reserved.  Patient Education       Sinusitis in Adults   The Basics   Written by the doctors and editors at [facility]   What is sinusitis? -- Sinusitis is a condition that can cause a stuffy nose, pain in the face, and discharge (mucus) from the nose. The sinuses are hollow areas in the bones of the face (figure 1). They have a thin lining that normally makes a small amount of mucus. When this lining gets irritated or infected, it swells and makes extra mucus. This causes symptoms.  Sinusitis can occur when a person gets sick with a cold. The germs causing the cold can also infect the sinuses. Many times, a person feels like their cold is getting better. But then they get sinusitis and begin to feel sick again.  What are the symptoms of sinusitis? -- Common symptoms of sinusitis include:  Stuffy or blocked nose  Thick white, yellow, or green discharge from the nose  Pain in the teeth  Pain or pressure in the face - This often feels worse when a person bends forward.  People with sinusitis can also have other symptoms that include:  Fever  Cough  Trouble smelling  Ear pressure or fullness  Headache  Bad breath  Feeling tired  Most of the time, symptoms start to improve in 7 to 10 days.  Should I see a doctor or nurse? -- See your doctor or nurse if your symptoms last more than 10 days, or if your symptoms first get better but then get worse.  Rarely, sinusitis can lead to serious problems. See your doctor or nurse right away (do not wait 10 days) if you have:  Fever higher than 102°F (38.9°C)  Sudden and severe pain in the face and head  Trouble seeing or seeing double  Trouble thinking clearly  Swelling or redness around one or both eyes  A stiff neck  Is there anything I  "can do on my own to feel better? -- Yes. To reduce your symptoms, you can:  Take an over-the-counter pain reliever to reduce the pain  Rinse your nose and sinuses with salt water a few times a day - Ask your doctor or nurse about the best way to do this.  Your doctor might also prescribe a steroid nose spray to reduce the swelling in your nose. (These kinds of steroid nose sprays are safe to take, and do not contain the same steroids that some athletes take illegally.)  How is sinusitis treated? -- Most of the time, sinusitis does not need to be treated with antibiotic medicines. This is because most sinusitis is caused by viruses, not bacteria, and antibiotics do not kill viruses. In fact, even sinusitis caused by bacteria will usually get better on its own without antibiotics.   Some people with sinusitis do need treatment with antibiotics. If your symptoms have not improved after 10 days, ask your doctor if you should take antibiotics. Your doctor might recommend that you wait 1 more week to see if your symptoms improve. But if you have symptoms such as a fever or a lot of pain, they might prescribe antibiotics. It is important to follow your doctor's instructions about taking your antibiotics.  What if my symptoms do not get better? -- If your symptoms do not get better, talk with your doctor or nurse. They might order tests to figure out why you still have symptoms. These can include:  CT scan or other imaging tests - Imaging tests create pictures of the inside of the body.  A test to look inside the sinuses - For this test, a doctor puts a thin tube with a camera on the end into the nose and up into the sinuses.  Some people get a lot of sinus infections or have symptoms that last at least 3 months. These people can have a different type of sinusitis called "chronic sinusitis." Chronic sinusitis can be caused by different things. For example, some people have growths inside their nose or sinuses that are " "called "polyps." Other people have allergies that cause their symptoms.  Chronic sinusitis can be treated in different ways. If you have chronic sinusitis, talk with your doctor about which treatments are right for you.  All topics are updated as new evidence becomes available and our peer review process is complete.  This topic retrieved from [employer] on: Sep 21, 2021.  Topic 47493 Version 17.0  Release: 29.4.2 - C29.263  © 2021 UpToDate, Inc. and/or its affiliates. All rights reserved.  figure 1: Sinuses of the face     This drawing shows the sinuses of the face, from the side and front views.  Graphic 036517 Version 1.0    Consumer Information Use and Disclaimer   This information is not specific medical advice and does not replace information you receive from your health care provider. This is only a brief summary of general information. It does NOT include all information about conditions, illnesses, injuries, tests, procedures, treatments, therapies, discharge instructions or life-style choices that may apply to you. You must talk with your health care provider for complete information about your health and treatment options. This information should not be used to decide whether or not to accept your health care provider's advice, instructions or recommendations. Only your health care provider has the knowledge and training to provide advice that is right for you. The use of this information is governed by the [employer] End User License Agreement, available at https://www.[employer]en/solutions/[employer]/about/aristeo.The use of [employer] content is governed by the [employer] Terms of Use. ©2021 UpToDate, Inc. All rights reserved.  Copyright   © 2021 UpToDate, Inc. and/or its affiliates. All rights reserved.        "

## 2023-07-25 NOTE — PROGRESS NOTES
HPI     Chief Complaint:  Chief Complaint   Patient presents with    Sinus Problem       Linh Clifton is a 63 y.o. female with multiple medical diagnoses as listed in the medical history and problem list that presents for cough and sore throat.  Pt is new to me but is known to this clinic with her last appointment being Visit date not found.      Sinus Problem  This is a new problem. The current episode started 1 to 4 weeks ago. The problem has been waxing and waning since onset. There has been no fever. Associated symptoms include chills, congestion, coughing (non productive), diaphoresis, headaches, a hoarse voice and a sore throat. Pertinent negatives include no shortness of breath. (Aural fullness) Treatments tried: claritin. The treatment provided mild relief.     Denies sick contacts. Reports home COVID-19 test was negative.     Pt was treated for bronchitis on 1/3/23.     She was treated for bacterial sinus infection on 5/27/23 with Augmentin.       Assessment & Plan     Problem List Items Addressed This Visit          Cardiac/Vascular    Hypertension (Chronic)    BP Readings from Last 3 Encounters:   07/25/23 126/70   01/04/23 128/68   10/31/22 138/78       -continue current medication regimen  -DASH diet, regular cardiovascular exercises, portion control  - ?weight loss  -f/u with BP logs in 2 weeks       Hyperlipidemia    discussed ways to lower triglycerides such as cutting simple sugars out of diet (white breads, candies, cookies, cakes, etc.) and reducing/eliminating intake of highly processed trans fatty acids.   Exercise 30 minutes a day for 4-5 days a week.   Eat more fiber.         Oncology    Anemia    The current medical regimen is effective;  continue present plan       Other Visit Diagnoses       Upper respiratory tract infection, unspecified type    -  Primary    -AFVSS in clinic today.  -Mild symptoms, most likely viral etiology.  -based on clinical findings today, does not warrant Abx  treatment at this time. D/w pt about the potential risks of inappropriate Abx use and that if patient's Sx worsens, we will re-evaluate to assess the need to change the treatment plan.    -Warm tea with honey and lemon, and/or warm salt water gargles every 4 hours PRN for sore throat. May try OTC Cepacol if pt desires.  -advised frequent hand washing, rest, and plenty of fluids. Increase water intake to 64-80 oz daily to help thin mucus.  -Tylenol tablets as needed for fever, headaches, sore throat, ear pain, bodyaches, and/or nasal/sinus inflammation.   -Nasal Saline spray (Over the counter [place] spray or [place])  2 sprays each nostril 2-3 times a day for nasal congestion.     I counseled the patient on fluids, rest, OTC medications that can safely be used, hand/cough hygiene, expected course of illness, and when further medical attention would be warranted.      COVID-19 screen    Medications as below    Discussed DDx, condition, and treatment.   Education sent to patient portal/included in after visit summary.  ED precautions given.   Notify provider if symptoms do not resolve or increase in severity.   Patient verbalizes understanding and agrees with plan of care.        Relevant Medications    sod bicarb-sod chlor-neti pot (SINUS WASH NETI POT) pkdv    menthol-herbal drugs Lozg    fluticasone propionate (FLONASE) 50 mcg/actuation nasal spray    promethazine-dextromethorphan (PROMETHAZINE-DM) 6.25-15 mg/5 mL Syrp    guaiFENesin (MUCINEX) 600 mg 12 hr tablet    Need for shingles vaccine        Relevant Medications    varicella-zoster gE-AS01B, PF, (SHINGRIX) 50 mcg/0.5 mL injection    Other Relevant Orders    Ambulatory referral/consult to [facility]    Colon cancer screening        Relevant Orders    Ambulatory referral/consult to Endo Procedure     Acute cough        As above.    Relevant Medications    sod bicarb-sod chlor-neti pot (SINUS WASH NETI POT) pkdv    menthol-herbal drugs  Lozg    fluticasone propionate (FLONASE) 50 mcg/actuation nasal spray    promethazine-dextromethorphan (PROMETHAZINE-DM) 6.25-15 mg/5 mL Syrp    guaiFENesin (MUCINEX) 600 mg 12 hr tablet    benzonatate (TESSALON) 200 MG capsule    Other Relevant Orders    COVID-19 Routine Screening            --------------------------------------------      Health Maintenance:  Health Maintenance         Date Due Completion Date    Hepatitis C Screening Never done ---    HIV Screening Never done ---    DEXA Scan Never done ---    Shingles Vaccine (1 of 2) Never done ---    COVID-19 Vaccine (3 - Moderna series) 06/03/2021 4/8/2021    Pneumococcal Vaccines (Age 65+) (1 - PCV) Never done ---    Colorectal Cancer Screening 06/15/2023 10/1/2020    Override on 11/12/2008: Done    Influenza Vaccine (1) 09/01/2023 10/24/2022    Mammogram 05/10/2024 5/10/2023    Override on 11/6/2013: Done    High Dose Statin 07/25/2024 7/25/2023    Aspirin/Antiplatelet Therapy 07/25/2024 7/25/2023    Hemoglobin A1c (Diabetic Prevention Screening) 06/23/2025 6/23/2022    Lipid Panel 06/23/2027 6/23/2022    TETANUS VACCINE 10/17/2027 10/17/2017    Override on 10/17/2017: Done            Shingles vaccine ordered and Colon cancer screening ordered    Follow Up:  Follow up in about 2 weeks (around 8/8/2023), or if symptoms worsen or fail to improve.    Exam     Review of Systems:  (as noted above)  Review of Systems   Constitutional:  Positive for chills and diaphoresis. Negative for fever.   HENT:  Positive for congestion, hoarse voice and sore throat. Negative for trouble swallowing.    Eyes:  Negative for visual disturbance.   Respiratory:  Positive for cough (non productive). Negative for chest tightness, shortness of breath and wheezing.    Cardiovascular:  Negative for chest pain.   Gastrointestinal:  Negative for blood in stool.   Neurological:  Positive for headaches.     Physical Exam:   Physical Exam  Constitutional:       General: She is not in acute  distress.     Appearance: She is not ill-appearing or diaphoretic.   HENT:      Head: Normocephalic and atraumatic.   Cardiovascular:      Rate and Rhythm: Normal rate and regular rhythm.      Heart sounds: No murmur heard.    No friction rub. No gallop.   Pulmonary:      Effort: No respiratory distress.   Chest:      Chest wall: No tenderness.   Musculoskeletal:      Cervical back: No rigidity.   Skin:     Capillary Refill: Capillary refill takes 2 to 3 seconds.   Neurological:      General: No focal deficit present.      Mental Status: She is alert and oriented to person, place, and time.     Vitals:    07/25/23 1039   BP: 126/70   BP Location: Left arm   Patient Position: Sitting   BP Method: Medium (Manual)   Pulse: 80   Temp: 98.4 °F (36.9 °C)   TempSrc: Oral   SpO2: 98%   Weight: 75.7 kg (166 lb 12.5 oz)      Body mass index is 26.92 kg/m².        History     Past Medical History:  Past Medical History:   Diagnosis Date    Anemia 4/16/2015    Coronary artery disease     GERD (gastroesophageal reflux disease)     Hyperlipidemia     Hypertension     Microcytic anemia 4/16/2015    Positive PPD 4/16/2015    Vitamin D deficiency disease 4/16/2015       Past Surgical History:  Past Surgical History:   Procedure Laterality Date    BREAST BIOPSY      BREAST CYST EXCISION      CATARACT EXTRACTION, BILATERAL Bilateral 02/2019    COLONOSCOPY N/A 6/15/2018    Procedure: COLONOSCOPY;  Surgeon: Mikael Wilde MD;  [facility];  Service: Endoscopy;  Laterality: N/A;  confirmed-ss    HYSTERECTOMY      PARTIAL HYSTERECTOMY      over 21 years ago       Social History:  Social History     Socioeconomic History    Marital status:    Occupational History    Occupation: PA asst     Employer: [employer]   Tobacco Use    Smoking status: Never    Smokeless tobacco: Never   Substance and Sexual Activity    Alcohol use: Yes     Alcohol/week: 1.0 standard drink     Types: 1 Glasses of wine per week     Comment:  occasional drinking    Drug use: No       Family History:  Family History   Problem Relation Age of Onset    Heart disease Father     Hyperlipidemia Father     Hyperlipidemia Mother     Kidney disease Mother     Cancer Brother         pancreas    Heart disease Brother     Breast cancer Neg Hx     Colon cancer Neg Hx     Ovarian cancer Neg Hx        Allergies and Medications: (updated and reviewed)  Review of patient's allergies indicates:  No Known Allergies  Current Outpatient Medications   Medication Sig Dispense Refill    albuterol (VENTOLIN HFA) 90 mcg/actuation inhaler Inhale 2 puffs into the lungs every 6 (six) hours as needed for Wheezing. Rescue 18 g 0    aspirin 81 MG Chew Take 81 mg by mouth once daily.      atorvastatin (LIPITOR) 20 MG tablet Take 1 tablet (20 mg total) by mouth once daily. 90 tablet 12    cetirizine (ZYRTEC) 10 MG tablet Take 10 mg by mouth once daily.      chlorpheniramine (CHLOR-TRIMETON) 4 mg tablet Take 4 mg by mouth every 6 (six) hours as needed for Allergies.      ergocalciferol (ERGOCALCIFEROL) 50,000 unit Cap Take 1 capsule by mouth once a week 12 capsule 12    hydroCHLOROthiazide (HYDRODIURIL) 25 MG tablet Take 1 tablet (25 mg total) by mouth once daily. 90 tablet 12    hydrOXYzine HCl (ATARAX) 25 MG tablet Take 25 mg by mouth.      metoprolol succinate (TOPROL-XL) 25 MG 24 hr tablet TAKE 1 TABLET BY MOUTH ONCE DAILY DUE FOR CHECKUP IN NEAR FUTURE LAST SEEN 2020 90 tablet 12    nystatin (MYCOSTATIN) cream Apply topically 2 (two) times daily. Apply to affected area BID 30 g 3    omeprazole (PRILOSEC) 20 MG capsule Take 1 capsule (20 mg total) by mouth once daily. 90 capsule 12    acyclovir (ZOVIRAX) 400 MG tablet Take 1 tablet (400 mg total) by mouth 3 (three) times daily as needed. 30 tablet 11    azelastine (ASTELIN) 137 mcg (0.1 %) nasal spray 1 spray (137 mcg total) by Nasal route 2 (two) times daily. 30 mL 0    benzonatate (TESSALON) 200 MG capsule Take 1 capsule (200 mg  total) by mouth 3 (three) times daily as needed for Cough. 15 capsule 0    fluticasone propionate (FLONASE) 50 mcg/actuation nasal spray 2 sprays (100 mcg total) by Each Nostril route once daily. 11.1 mL 0    guaiFENesin (MUCINEX) 600 mg 12 hr tablet Take 1 tablet (600 mg total) by mouth 2 (two) times daily as needed for Congestion. 20 tablet 0    hydrocortisone 2.5 % cream Apply topically 2 (two) times daily. for 7 days 28 g 0    menthol-herbal drugs Lozg 1 lozenge by Mucous Membrane route every 2 (two) hours as needed (cough and sore throat). 24 lozenge 2    promethazine-dextromethorphan (PROMETHAZINE-DM) 6.25-15 mg/5 mL Syrp Take 5 mLs by mouth every 8 (eight) hours as needed (cough). 240 mL 0    sod bicarb-sod chlor-neti pot (SINUS WASH NETI POT) pkdv Use three times daily as needed for sinus congestion 1 each 0    varicella-zoster gE-AS01B, PF, (SHINGRIX) 50 mcg/0.5 mL injection Inject 0.5 mLs into the muscle once. for 1 dose 1 each 0     No current facility-administered medications for this visit.       Patient Care Team:  Feliciano Chandler MD as PCP - General (Internal Medicine)         - The patient is given an After Visit Summary that lists all medications with directions, allergies, education, orders placed during this encounter and follow-up instructions.      - I have reviewed the patient's medical information including past medical, family, and social history sections including the medications and allergies.      - We discussed the patient's current medications.     This note was created by combination of typed  and MModal dictation.  Transcription errors may be present.  If there are any questions, please contact me.       Rodolfo Urena NP

## 2023-07-26 ENCOUNTER — PATIENT MESSAGE (OUTPATIENT)
Dept: FAMILY MEDICINE | Facility: CLINIC | Age: 63
End: 2023-07-26
Payer: MEDICARE

## 2023-07-26 ENCOUNTER — TELEPHONE (OUTPATIENT)
Dept: FAMILY MEDICINE | Facility: CLINIC | Age: 63
End: 2023-07-26
Payer: MEDICARE

## 2023-07-26 NOTE — TELEPHONE ENCOUNTER
Called pt and discussed lab results, all questions answered. + for COVID-19.    She reports her symptoms have improved over night.    Discussed pathophysiology, symptom/presentation, and management of COVID-19.    Continue with supportive care, Tylenol every 4-6 hours as needed for fever, headaches, sore throat, ear pain, bodyaches, and/or nasal/sinus inflammation    Discussed the importance of hydration and rest.     Provided patient with material outlining COVID-19 and treatment plan    Discussed treatment options. She is not a candidate for EUA Paxlovid.     Recommend to stay inside and isolate yourself from family members, as well as washing your hands frequently and drinking plenty of fluids. Wear a mask if you absolutely need to go out and practice social distancing.  Before resuming your regular activities, you should be fever-free for at least 72 hours WITHOUT taking any fever reducting medications (i.e, Tylenol) and/or being at least 5 days out from the initial positive test. Wear a facemask when around others. Cover your coughs and sneezes. Wash your hands often with soap and water; hand  can be used, too. Avoid sharing personal household items. Wipe down surfaces used daily.    ED precautions given.   Notify provider if symptoms do not resolve or increase in severity.   Patient verbalizes understanding and agrees with plan of care.

## 2023-07-28 ENCOUNTER — NURSE TRIAGE (OUTPATIENT)
Dept: ADMINISTRATIVE | Facility: CLINIC | Age: 63
End: 2023-07-28
Payer: MEDICARE

## 2023-07-28 NOTE — TELEPHONE ENCOUNTER
[place]    PCP:  Dr. Feliciano Breauxensing    C/O HA and nasal congestion.  She reports that it's not worse but symptoms aren't improving.  She is taking [employer] Severe Sinus Congestion but it is not helping.  Denies fever, SOB, and CP.  Symptoms started on Monday.  Per protocol, care advised is go to office now.  Pt refused care advised.  Care advice reinforced but pt continues to refuse care advice.  VV with PCP/Dept and [facility] VV offered but pt continues to refuse appt.  Advised to call for worsening/questions/concerns.  VU.     Reason for Disposition   SEVERE sinus pain    Additional Information   Negative: Sounds like a life-threatening emergency to the triager   Negative: Difficulty breathing, and not from stuffy nose (e.g., not relieved by cleaning out the nose)   Negative: SEVERE headache and has fever   Negative: Patient sounds very sick or weak to the triager    Protocols used: Sinus Pain or Congestion-A-[place]

## 2023-08-21 ENCOUNTER — TELEPHONE (OUTPATIENT)
Dept: ENDOSCOPY | Facility: HOSPITAL | Age: 63
End: 2023-08-21
Payer: MEDICARE

## 2023-08-21 DIAGNOSIS — Z12.11 COLON CANCER SCREENING: Primary | ICD-10-CM

## 2023-08-21 NOTE — TELEPHONE ENCOUNTER
Called patient to schedule Colonoscopy, procedure scheduled, instructions sent to e-mail: [email or web address]

## 2023-09-27 ENCOUNTER — TELEPHONE (OUTPATIENT)
Dept: ENDOSCOPY | Facility: HOSPITAL | Age: 63
End: 2023-09-27
Payer: MEDICARE

## 2023-09-27 VITALS — HEIGHT: 66 IN | BODY MASS INDEX: 26.68 KG/M2 | WEIGHT: 166 LBS

## 2023-09-27 NOTE — TELEPHONE ENCOUNTER
Spoke to patient to schedule procedure(s) Colonoscopy       Physician to perform procedure(s) Dr. KALLI Farah  Date of Procedure (s) 11/10/23  Arrival Time 7:00 AM  Time of Procedure(s) 8:00 AM   Location of Procedure(s) [facility] [address] [facility]  Type of Rx Prep sent to patient: PEG  Instructions provided to patient via Postal Mail    Patient was informed on the following information and verbalized understanding. Screening questionnaire reviewed with patient and complete. If procedure requires anesthesia, a responsible adult needs to be present to accompany the patient home, patient cannot drive after receiving anesthesia. Appointment details are tentative, especially check-in time. Patient will receive a prep-op call 4 days prior to confirm check-in time for procedure. If applicable the patient should contact their pharmacy to verify Rx for procedure prep is ready for pick-up. Patient was advised to call the scheduling department at [phone] if pharmacy states no Rx is available. Patient was advised to call the endoscopy scheduling department if any questions or concerns arise.      [facility] Scheduling Department

## 2023-09-27 NOTE — TELEPHONE ENCOUNTER
No care due was identified.  [facility] Embedded Care Due Messages. Reference number: [id number].   9/26/2023 7:49:26 PM CDT

## 2023-09-28 RX ORDER — ATORVASTATIN CALCIUM 20 MG/1
20 TABLET, FILM COATED ORAL
Qty: 90 TABLET | Refills: 0 | Status: SHIPPED | OUTPATIENT
Start: 2023-09-28 | End: 2023-12-26

## 2023-10-02 ENCOUNTER — TELEPHONE (OUTPATIENT)
Dept: FAMILY MEDICINE | Facility: CLINIC | Age: 63
End: 2023-10-02
Payer: MEDICARE

## 2023-10-02 NOTE — TELEPHONE ENCOUNTER
----- Message from Errol Olivier sent at 9/29/2023 10:43 AM CDT -----  Type: Patient Call Back    Who called:self     What is the request in detail: Asking for lab orders and shingles injection     Can the clinic reply by [facility]? No     Would the patient rather a call back or a response via [facility]? Call     Best call back number: [phone] (home)

## 2023-10-06 ENCOUNTER — HOSPITAL ENCOUNTER (OUTPATIENT)
Dept: RADIOLOGY | Facility: CLINIC | Age: 63
Discharge: HOME | End: 2023-10-06
Attending: INTERNAL MEDICINE
Payer: MEDICARE

## 2023-10-06 DIAGNOSIS — Z78.0 MENOPAUSE: ICD-10-CM

## 2023-10-06 PROCEDURE — 77080 DXA BONE DENSITY AXIAL SKELETON 1 OR MORE SITES: ICD-10-PCS | Mod: 26,HCNC,, | Performed by: INTERNAL MEDICINE

## 2023-10-06 PROCEDURE — 77080 DXA BONE DENSITY AXIAL: CPT | Mod: TC,HCNC,PO

## 2023-10-06 PROCEDURE — 77080 DXA BONE DENSITY AXIAL: CPT | Mod: 26,HCNC,, | Performed by: INTERNAL MEDICINE

## 2023-10-26 ENCOUNTER — OFFICE VISIT (OUTPATIENT)
Dept: FAMILY MEDICINE | Facility: CLINIC | Age: 63
End: 2023-10-26
Payer: MEDICARE

## 2023-10-26 VITALS
TEMPERATURE: 98 F | HEIGHT: 66 IN | HEART RATE: 90 BPM | WEIGHT: 171.5 LBS | BODY MASS INDEX: 27.56 KG/M2 | OXYGEN SATURATION: 97 % | DIASTOLIC BLOOD PRESSURE: 60 MMHG | SYSTOLIC BLOOD PRESSURE: 132 MMHG

## 2023-10-26 DIAGNOSIS — I25.10 CORONARY ARTERY DISEASE INVOLVING NATIVE CORONARY ARTERY OF NATIVE HEART WITHOUT ANGINA PECTORIS: ICD-10-CM

## 2023-10-26 DIAGNOSIS — Z12.31 ENCOUNTER FOR SCREENING MAMMOGRAM FOR BREAST CANCER: ICD-10-CM

## 2023-10-26 DIAGNOSIS — J30.2 SEASONAL ALLERGIC RHINITIS, UNSPECIFIED TRIGGER: ICD-10-CM

## 2023-10-26 DIAGNOSIS — D50.9 MICROCYTIC ANEMIA: ICD-10-CM

## 2023-10-26 DIAGNOSIS — Z12.11 SCREENING FOR COLORECTAL CANCER: ICD-10-CM

## 2023-10-26 DIAGNOSIS — Z12.12 SCREENING FOR COLORECTAL CANCER: ICD-10-CM

## 2023-10-26 DIAGNOSIS — E55.9 VITAMIN D DEFICIENCY DISEASE: ICD-10-CM

## 2023-10-26 DIAGNOSIS — E78.5 HYPERLIPIDEMIA, UNSPECIFIED HYPERLIPIDEMIA TYPE: ICD-10-CM

## 2023-10-26 DIAGNOSIS — D64.9 ANEMIA, UNSPECIFIED TYPE: ICD-10-CM

## 2023-10-26 DIAGNOSIS — Z86.010 HISTORY OF COLONIC POLYPS: ICD-10-CM

## 2023-10-26 DIAGNOSIS — K21.9 GASTROESOPHAGEAL REFLUX DISEASE, UNSPECIFIED WHETHER ESOPHAGITIS PRESENT: ICD-10-CM

## 2023-10-26 DIAGNOSIS — D56.3 BETA THALASSEMIA TRAIT: ICD-10-CM

## 2023-10-26 DIAGNOSIS — Z00.00 ROUTINE MEDICAL EXAM: Primary | ICD-10-CM

## 2023-10-26 DIAGNOSIS — I10 PRIMARY HYPERTENSION: ICD-10-CM

## 2023-10-26 PROCEDURE — 99999 PR PBB SHADOW E&M-EST. PATIENT-LVL III: ICD-10-PCS | Mod: PBBFAC,HCNC,, | Performed by: INTERNAL MEDICINE

## 2023-10-26 PROCEDURE — 99214 PR OFFICE/OUTPT VISIT, EST, LEVL IV, 30-39 MIN: ICD-10-PCS | Mod: 25,HCNC,S$GLB, | Performed by: INTERNAL MEDICINE

## 2023-10-26 PROCEDURE — 3075F SYST BP GE 130 - 139MM HG: CPT | Mod: HCNC,CPTII,S$GLB, | Performed by: INTERNAL MEDICINE

## 2023-10-26 PROCEDURE — 3078F PR MOST RECENT DIASTOLIC BLOOD PRESSURE < 80 MM HG: ICD-10-PCS | Mod: HCNC,CPTII,S$GLB, | Performed by: INTERNAL MEDICINE

## 2023-10-26 PROCEDURE — 3288F PR FALLS RISK ASSESSMENT DOCUMENTED: ICD-10-PCS | Mod: HCNC,CPTII,S$GLB, | Performed by: INTERNAL MEDICINE

## 2023-10-26 PROCEDURE — 3008F BODY MASS INDEX DOCD: CPT | Mod: HCNC,CPTII,S$GLB, | Performed by: INTERNAL MEDICINE

## 2023-10-26 PROCEDURE — 3075F PR MOST RECENT SYSTOLIC BLOOD PRESS GE 130-139MM HG: ICD-10-PCS | Mod: HCNC,CPTII,S$GLB, | Performed by: INTERNAL MEDICINE

## 2023-10-26 PROCEDURE — 1101F PR PT FALLS ASSESS DOC 0-1 FALLS W/OUT INJ PAST YR: ICD-10-PCS | Mod: HCNC,CPTII,S$GLB, | Performed by: INTERNAL MEDICINE

## 2023-10-26 PROCEDURE — 3078F DIAST BP <80 MM HG: CPT | Mod: HCNC,CPTII,S$GLB, | Performed by: INTERNAL MEDICINE

## 2023-10-26 PROCEDURE — 99397 PER PM REEVAL EST PAT 65+ YR: CPT | Mod: HCNC,S$GLB,, | Performed by: INTERNAL MEDICINE

## 2023-10-26 PROCEDURE — 99397 PR PREVENTIVE VISIT,EST,65 & OVER: ICD-10-PCS | Mod: HCNC,S$GLB,, | Performed by: INTERNAL MEDICINE

## 2023-10-26 PROCEDURE — 1101F PT FALLS ASSESS-DOCD LE1/YR: CPT | Mod: HCNC,CPTII,S$GLB, | Performed by: INTERNAL MEDICINE

## 2023-10-26 PROCEDURE — 3008F PR BODY MASS INDEX (BMI) DOCUMENTED: ICD-10-PCS | Mod: HCNC,CPTII,S$GLB, | Performed by: INTERNAL MEDICINE

## 2023-10-26 PROCEDURE — 3288F FALL RISK ASSESSMENT DOCD: CPT | Mod: HCNC,CPTII,S$GLB, | Performed by: INTERNAL MEDICINE

## 2023-10-26 PROCEDURE — 99214 OFFICE O/P EST MOD 30 MIN: CPT | Mod: 25,HCNC,S$GLB, | Performed by: INTERNAL MEDICINE

## 2023-10-26 PROCEDURE — 99999 PR PBB SHADOW E&M-EST. PATIENT-LVL III: CPT | Mod: PBBFAC,HCNC,, | Performed by: INTERNAL MEDICINE

## 2023-10-26 NOTE — PROGRESS NOTES
Chief complaint:  Follow-up /physical exam- last seen       63-year-old black female here for her physical.   mammogram .  All her blood work was ok .  Doing well, still working at a [employer].  due to insurance reasons she needed to find a new gyn -seen 10/22      Reviewed her history of a hyperplastic polyp and she could go 10 years.  She is more comfortable going every five years and so she    ROS:   CONST: weight stable. EYES: no vision change. ENT: no sore throat. CV: no chest pain w/ exertion. RESP: no shortness of breath. GI: no nausea, vomiting, diarrhea. No dysphagia. : no urinary issues. MUSCULOSKELETAL: no new myalgias or arthralgias. SKIN: no new changes. NEURO: no focal deficits. PSYCH: no new issues. ENDOCRINE: no polyuria. HEME: no lymph nodes. ALLERGY: no general pruritis.      Past medical history:  1.  Hypertension  2.  Hyperlipidemia  3.  Heart disease, seen by Dr. Tirado in the past and cardiac angiogram over 10 years ago showed only a 40% blockage- SARAH neg   4.  Normal colonoscopy at age 48, 1 polyp Hyperplastic polyp  - 5-10 years  5.  Mammograms done outside the system  6.  ALLERGIC rhinitis  7.  Vitamin D deficiency  8.  Positive PPD with normal chest x-rays  9.  Microcytic anemia -beta thalassemia trait AND iron def  10.  Hyperplastic polyp on colonoscopy , 10 years  11.  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  Both brothers were smokers.    Vital signs as above,   Gen: no distress  EYES: conjunctiva clear, non-icteric, PERRL  ENT: nose clear, nasal mucosa normal, oropharynx clear and moist, teeth good  NECK:supple, thyroid non-palpable  RESP: effort  is good, lungs clear  CV: heart RRR w/o murmur, gallops or rubs; no carotid bruits, no edema  GI: abdomen soft, non-distended, non-tender, no hepatosplenomegaly  MS: gait normal, no clubbing or cyanosis of the digits  SKIN: no rashes, warm to touch      Diagnoses and all orders for this visit:    Routine medical exam, keep follow-up with gyn, she chooses to get colonoscopy, up-to-date on mammogram  -     Comprehensive Metabolic Panel; Future  -     TSH; Future  -     Lipid Panel; Future  -     CBC Auto Differential; Future  -     Hepatitis C Antibody; Future    Encounter for screening mammogram for breast cancer    Screening for colorectal cancer                                            Additional evaluation and management issues:      Additionally patient has other medical issues to address separate from her physical.  She has hypertension which is running under good control.  We will check renal function other associated lab work.  She is due for lipid profile which was excellent in the past.  She has a chronic hereditary anemia which we will reassess.  She did have some iron deficiency as well but given the thalassemia would hesitate to add additional iron unless blood counts are significantly lower..  She has history of coronary disease but no angina.  We will assess cardiac risk factors especially lipids.  She has history of reflux which is quiet at this time.  Vitamin-D deficiency was adequately replaced in the past.  She has seasonal allergies which are ulcer under good control.    We discussed vaccines and now that she is age 63 we discussed Prevnar 20 but she did leave the clinic today before we can get that to her.  She has her annual wellness visit tomorrow and that can be addressed at that time.  We discussed the shingles vaccine at the pharmacy as well as getting the updated COVID vaccine.  She received her flu shot already.                  Evaluation and management of the separate issues will be  based on medical decision making.                  Assessment and plan:                 Primary hypertension, chronic and stable  -     Comprehensive Metabolic Panel; Future  -     TSH; Future  -     Lipid Panel; Future  -     CBC Auto Differential; Future    Hyperlipidemia, unspecified hyperlipidemia type, reassess  -     Lipid Panel; Future    Anemia, unspecified type, reassess  -     CBC Auto Differential; Future    History of colonic polyps, patient wishes to follow-up every five years even though polyp was hyperplastic, no family history of colon cancer    Coronary artery disease involving native coronary artery of native heart without angina pectoris, chronic and stable    Beta thalassemia trait, reassess    Gastroesophageal reflux disease, unspecified whether esophagitis present, chronic and stable    Microcytic anemia    Vitamin D deficiency disease, chronic and stable    Seasonal allergic rhinitis, unspecified trigger, chronic and stable

## 2023-10-27 ENCOUNTER — OFFICE VISIT (OUTPATIENT)
Dept: FAMILY MEDICINE | Facility: CLINIC | Age: 63
End: 2023-10-27
Payer: MEDICARE

## 2023-10-27 ENCOUNTER — LAB VISIT (OUTPATIENT)
Dept: LAB | Facility: HOSPITAL | Age: 63
End: 2023-10-27
Attending: INTERNAL MEDICINE
Payer: MEDICARE

## 2023-10-27 VITALS
BODY MASS INDEX: 27.37 KG/M2 | SYSTOLIC BLOOD PRESSURE: 128 MMHG | TEMPERATURE: 99 F | DIASTOLIC BLOOD PRESSURE: 72 MMHG | HEART RATE: 88 BPM | WEIGHT: 170.31 LBS | HEIGHT: 66 IN | OXYGEN SATURATION: 98 %

## 2023-10-27 DIAGNOSIS — D64.9 ANEMIA, UNSPECIFIED TYPE: ICD-10-CM

## 2023-10-27 DIAGNOSIS — E55.9 VITAMIN D DEFICIENCY DISEASE: ICD-10-CM

## 2023-10-27 DIAGNOSIS — E78.5 HYPERLIPIDEMIA, UNSPECIFIED HYPERLIPIDEMIA TYPE: ICD-10-CM

## 2023-10-27 DIAGNOSIS — J30.2 SEASONAL ALLERGIC RHINITIS, UNSPECIFIED TRIGGER: ICD-10-CM

## 2023-10-27 DIAGNOSIS — I10 HYPERTENSION, UNSPECIFIED TYPE: Chronic | ICD-10-CM

## 2023-10-27 DIAGNOSIS — D50.9 MICROCYTIC ANEMIA: ICD-10-CM

## 2023-10-27 DIAGNOSIS — I10 PRIMARY HYPERTENSION: ICD-10-CM

## 2023-10-27 DIAGNOSIS — Z00.00 ENCOUNTER FOR PREVENTIVE HEALTH EXAMINATION: Primary | ICD-10-CM

## 2023-10-27 DIAGNOSIS — D56.3 BETA THALASSEMIA TRAIT: ICD-10-CM

## 2023-10-27 DIAGNOSIS — Z00.00 ROUTINE MEDICAL EXAM: ICD-10-CM

## 2023-10-27 PROCEDURE — 1160F RVW MEDS BY RX/DR IN RCRD: CPT | Mod: HCNC,CPTII,S$GLB, | Performed by: NURSE PRACTITIONER

## 2023-10-27 PROCEDURE — 3288F PR FALLS RISK ASSESSMENT DOCUMENTED: ICD-10-PCS | Mod: HCNC,CPTII,S$GLB, | Performed by: NURSE PRACTITIONER

## 2023-10-27 PROCEDURE — 1101F PT FALLS ASSESS-DOCD LE1/YR: CPT | Mod: HCNC,CPTII,S$GLB, | Performed by: NURSE PRACTITIONER

## 2023-10-27 PROCEDURE — G0402 INITIAL PREVENTIVE EXAM: HCPCS | Mod: HCNC,S$GLB,, | Performed by: NURSE PRACTITIONER

## 2023-10-27 PROCEDURE — 99999 PR PBB SHADOW E&M-EST. PATIENT-LVL V: CPT | Mod: PBBFAC,HCNC,, | Performed by: NURSE PRACTITIONER

## 2023-10-27 PROCEDURE — 1160F PR REVIEW ALL MEDS BY PRESCRIBER/CLIN PHARMACIST DOCUMENTED: ICD-10-PCS | Mod: HCNC,CPTII,S$GLB, | Performed by: NURSE PRACTITIONER

## 2023-10-27 PROCEDURE — 1159F MED LIST DOCD IN RCRD: CPT | Mod: HCNC,CPTII,S$GLB, | Performed by: NURSE PRACTITIONER

## 2023-10-27 PROCEDURE — 3074F PR MOST RECENT SYSTOLIC BLOOD PRESSURE < 130 MM HG: ICD-10-PCS | Mod: HCNC,CPTII,S$GLB, | Performed by: NURSE PRACTITIONER

## 2023-10-27 PROCEDURE — G0402 PR WELCOME MEDICARE PREVENTIVE VISIT NEW ENROLLEE: ICD-10-PCS | Mod: HCNC,S$GLB,, | Performed by: NURSE PRACTITIONER

## 2023-10-27 PROCEDURE — 3074F SYST BP LT 130 MM HG: CPT | Mod: HCNC,CPTII,S$GLB, | Performed by: NURSE PRACTITIONER

## 2023-10-27 PROCEDURE — 3078F DIAST BP <80 MM HG: CPT | Mod: HCNC,CPTII,S$GLB, | Performed by: NURSE PRACTITIONER

## 2023-10-27 PROCEDURE — 99999 PR PBB SHADOW E&M-EST. PATIENT-LVL V: ICD-10-PCS | Mod: PBBFAC,HCNC,, | Performed by: NURSE PRACTITIONER

## 2023-10-27 PROCEDURE — 1159F PR MEDICATION LIST DOCUMENTED IN MEDICAL RECORD: ICD-10-PCS | Mod: HCNC,CPTII,S$GLB, | Performed by: NURSE PRACTITIONER

## 2023-10-27 PROCEDURE — 3288F FALL RISK ASSESSMENT DOCD: CPT | Mod: HCNC,CPTII,S$GLB, | Performed by: NURSE PRACTITIONER

## 2023-10-27 PROCEDURE — 3078F PR MOST RECENT DIASTOLIC BLOOD PRESSURE < 80 MM HG: ICD-10-PCS | Mod: HCNC,CPTII,S$GLB, | Performed by: NURSE PRACTITIONER

## 2023-10-27 PROCEDURE — 1101F PR PT FALLS ASSESS DOC 0-1 FALLS W/OUT INJ PAST YR: ICD-10-PCS | Mod: HCNC,CPTII,S$GLB, | Performed by: NURSE PRACTITIONER

## 2023-10-27 NOTE — PATIENT INSTRUCTIONS
Counseling and Referral of Other Preventative  (Italic type indicates deductible and co-insurance are waived)    Patient Name: Linh Clifton  Today's Date: 10/27/2023    Health Maintenance       Date Due Completion Date    Hepatitis C Screening Never done ---    Pneumococcal Vaccines (Age 65+) (1 - PCV) Never done ---    HIV Screening Never done ---    Shingles Vaccine (1 of 2) Never done ---    Colorectal Cancer Screening 06/15/2023 10/1/2020    Override on 11/12/2008: Done    COVID-19 Vaccine (3 - 2023-24 season) 09/01/2023 4/8/2021    Mammogram 05/10/2024 5/10/2023    Override on 11/6/2013: Done    Aspirin/Antiplatelet Therapy 07/25/2024 7/25/2023    High Dose Statin 09/28/2024 9/28/2023    DEXA Scan 10/06/2025 10/6/2023    TETANUS VACCINE 10/17/2027 10/17/2017    Override on 10/17/2017: Done        No orders of the defined types were placed in this encounter.    The following information is provided to all patients.  This information is to help you find resources for any of the problems found today that may be affecting your health:                Living healthy guide: www.[facility].[place].gov      Understanding Diabetes: www.diabetes.org      Eating healthy: www.cdc.gov/healthyweight      [facility] home safety checklist: www.cdc.gov/steadi/patient.html      Agency on Aging: www.goea.[place].[facility]      Alcoholics anonymous (AA): www.aa.org      Physical Activity: www.[place].nih.gov/kv2muuo      Tobacco use: www.quitwithusla.org

## 2023-10-27 NOTE — PROGRESS NOTES
"  Linh Clifton presented for a  Medicare AWV and comprehensive Health Risk Assessment today. The following components were reviewed and updated:    Medical history  Family History  Social history  Allergies and Current Medications  Health Risk Assessment  Health Maintenance  Care Team       ** See Completed Assessments for Annual Wellness Visit within the encounter summary.**       The following assessments were completed:  Living Situation  CAGE  Depression Screening  Timed Get Up and Go  Whisper Test  Cognitive Function Screening  Nutrition Screening  ADL Screening  PAQ Screening          Vitals:    10/27/23 0929   BP: 128/72   Pulse: 88   Temp: 98.5 °F (36.9 °C)   TempSrc: Oral   SpO2: 98%   Weight: 77.3 kg (170 lb 4.9 oz)   Height: 5' 6" (1.676 m)     Body mass index is 27.49 kg/m².  Physical Exam  Vitals and nursing note reviewed.   Constitutional:       Appearance: Normal appearance.   Cardiovascular:      Rate and Rhythm: Normal rate.      Pulses: Normal pulses.      Heart sounds: Normal heart sounds.   Pulmonary:      Effort: Pulmonary effort is normal.      Breath sounds: Normal breath sounds.   Musculoskeletal:         General: Normal range of motion.   Neurological:      Mental Status: She is alert and oriented to person, place, and time.   Psychiatric:         Mood and Affect: Mood normal.         Behavior: Behavior normal.             Diagnoses and health risks identified today and associated recommendations/orders:    1. Encounter for preventive health examination  Pt was seen today for an Annual Wellness visit. Healthcare maintenance and screening recommendations were discussed and updated as indicated. Return in one year for AWV.    Review current opioid prescriptions:n/a  Screen for potential Substance Use Disorders:n/a    2. Hypertension, unspecified type  Stable. The current medical regimen is effective;  continue present plan and medications.    3. Hyperlipidemia, unspecified hyperlipidemia " type  Stable. On statin therapy. The current medical regimen is effective;  continue present plan and medications.    4. Microcytic anemia  The current medical regimen is effective;  continue present plan and medications.  Followed by PCP.    5. Beta thalassemia trait  The current medical regimen is effective;  continue present plan and medications.  Followed by PCP.    6. Vitamin D deficiency disease  The current medical regimen is effective;  continue present plan and medications.  Followed by PCP.     7. Seasonal allergic rhinitis, unspecified trigger  Avoid known triggers. The current medical regimen is effective;  continue present plan and medications.        Provided Linh with a 5-10 year written screening schedule and personal prevention plan. Recommendations were developed using the USPSTF age appropriate recommendations. Education, counseling, and referrals were provided as needed. After Visit Summary printed and given to patient which includes a list of additional screenings\tests needed.    No follow-ups on file.    MANDEEP Menon  I offered to discuss advanced care planning, including how to pick a person who would make decisions for you if you were unable to make them for yourself, called a health care power of , and what kind of decisions you might make such as use of life sustaining treatments such as ventilators and tube feeding when faced with a life limiting illness recorded on a living will that they will need to know. (How you want to be cared for as you near the end of your natural life)     X Patient is interested in learning more about how to make advanced directives.  I provided them paperwork and offered to discuss this with them.

## 2023-10-28 LAB
ALBUMIN SERPL BCP-MCNC: 3.4 G/DL (ref 3.5–5.2)
ALP SERPL-CCNC: 140 U/L (ref 55–135)
ALT SERPL W/O P-5'-P-CCNC: 20 U/L (ref 10–44)
ANION GAP SERPL CALC-SCNC: 12 MMOL/L (ref 8–16)
AST SERPL-CCNC: 26 U/L (ref 10–40)
BASOPHILS # BLD AUTO: 0.02 K/UL (ref 0–0.2)
BASOPHILS NFR BLD: 0.4 % (ref 0–1.9)
BILIRUB SERPL-MCNC: 0.3 MG/DL (ref 0.1–1)
BUN SERPL-MCNC: 19 MG/DL (ref 8–23)
CALCIUM SERPL-MCNC: 9.7 MG/DL (ref 8.7–10.5)
CHLORIDE SERPL-SCNC: 106 MMOL/L (ref 95–110)
CHOLEST SERPL-MCNC: 173 MG/DL (ref 120–199)
CHOLEST/HDLC SERPL: 3 {RATIO} (ref 2–5)
CO2 SERPL-SCNC: 24 MMOL/L (ref 23–29)
CREAT SERPL-MCNC: 0.8 MG/DL (ref 0.5–1.4)
DIFFERENTIAL METHOD: ABNORMAL
EOSINOPHIL # BLD AUTO: 0.2 K/UL (ref 0–0.5)
EOSINOPHIL NFR BLD: 3.5 % (ref 0–8)
ERYTHROCYTE [DISTWIDTH] IN BLOOD BY AUTOMATED COUNT: 17.9 % (ref 11.5–14.5)
EST. GFR  (NO RACE VARIABLE): >60 ML/MIN/1.73 M^2
GLUCOSE SERPL-MCNC: 92 MG/DL (ref 70–110)
HCT VFR BLD AUTO: 33.8 % (ref 37–48.5)
HCV AB SERPL QL IA: NORMAL
HDLC SERPL-MCNC: 58 MG/DL (ref 40–75)
HDLC SERPL: 33.5 % (ref 20–50)
HGB BLD-MCNC: 10 G/DL (ref 12–16)
IMM GRANULOCYTES # BLD AUTO: 0.02 K/UL (ref 0–0.04)
IMM GRANULOCYTES NFR BLD AUTO: 0.4 % (ref 0–0.5)
LDLC SERPL CALC-MCNC: 100 MG/DL (ref 63–159)
LYMPHOCYTES # BLD AUTO: 2.3 K/UL (ref 1–4.8)
LYMPHOCYTES NFR BLD: 43.7 % (ref 18–48)
MCH RBC QN AUTO: 19.8 PG (ref 27–31)
MCHC RBC AUTO-ENTMCNC: 29.6 G/DL (ref 32–36)
MCV RBC AUTO: 67 FL (ref 82–98)
MONOCYTES # BLD AUTO: 0.4 K/UL (ref 0.3–1)
MONOCYTES NFR BLD: 7.7 % (ref 4–15)
NEUTROPHILS # BLD AUTO: 2.3 K/UL (ref 1.8–7.7)
NEUTROPHILS NFR BLD: 44.3 % (ref 38–73)
NONHDLC SERPL-MCNC: 115 MG/DL
NRBC BLD-RTO: 0 /100 WBC
PLATELET # BLD AUTO: 222 K/UL (ref 150–450)
PMV BLD AUTO: ABNORMAL FL (ref 9.2–12.9)
POTASSIUM SERPL-SCNC: 3.9 MMOL/L (ref 3.5–5.1)
PROT SERPL-MCNC: 7.2 G/DL (ref 6–8.4)
RBC # BLD AUTO: 5.05 M/UL (ref 4–5.4)
SODIUM SERPL-SCNC: 142 MMOL/L (ref 136–145)
TRIGL SERPL-MCNC: 75 MG/DL (ref 30–150)
TSH SERPL DL<=0.005 MIU/L-ACNC: 1.75 UIU/ML (ref 0.4–4)
WBC # BLD AUTO: 5.2 K/UL (ref 3.9–12.7)

## 2023-10-28 PROCEDURE — 85025 COMPLETE CBC W/AUTO DIFF WBC: CPT | Mod: HCNC | Performed by: INTERNAL MEDICINE

## 2023-10-28 PROCEDURE — 36415 COLL VENOUS BLD VENIPUNCTURE: CPT | Mod: HCNC,PO | Performed by: INTERNAL MEDICINE

## 2023-10-28 PROCEDURE — 80053 COMPREHEN METABOLIC PANEL: CPT | Mod: HCNC | Performed by: INTERNAL MEDICINE

## 2023-10-28 PROCEDURE — 86803 HEPATITIS C AB TEST: CPT | Mod: HCNC | Performed by: INTERNAL MEDICINE

## 2023-10-28 PROCEDURE — 84443 ASSAY THYROID STIM HORMONE: CPT | Mod: HCNC | Performed by: INTERNAL MEDICINE

## 2023-10-28 PROCEDURE — 80061 LIPID PANEL: CPT | Mod: HCNC | Performed by: INTERNAL MEDICINE

## 2023-11-03 ENCOUNTER — TELEPHONE (OUTPATIENT)
Dept: ENDOSCOPY | Facility: HOSPITAL | Age: 63
End: 2023-11-03
Payer: MEDICARE

## 2023-11-09 NOTE — H&P
[facility] 4th Floor  Colorectal Surgery  History & Physical    Patient Name: Linh Clifton  MRN: [id number]  Admission Date: 11/10/2023  Attending Physician: Felicia Daniels MD   Primary Care Provider: Feliciano Chandler MD    Subjective:     Chief Complaint/Reason for Admission: colonoscopy    History of Present Illness: Ms. Clifton presents for colonoscopy.  Last study was 2018 with 1 polyp removed.  No family history of colon cancer.  No changes in bowel habits    PTA Medications   Medication Sig    aspirin 81 MG Chew Take 81 mg by mouth once daily.    atorvastatin (LIPITOR) 20 MG tablet Take 1 tablet by mouth once daily    cetirizine (ZYRTEC) 10 MG tablet Take 10 mg by mouth once daily.    ergocalciferol (ERGOCALCIFEROL) 50,000 unit Cap Take 1 capsule by mouth once a week    hydroCHLOROthiazide (HYDRODIURIL) 25 MG tablet Take 1 tablet (25 mg total) by mouth once daily.    metoprolol succinate (TOPROL-XL) 25 MG 24 hr tablet TAKE 1 TABLET BY MOUTH ONCE DAILY DUE  FOR  CHECKUP  IN  NEAR  FUTURE    omeprazole (PRILOSEC) 20 MG capsule Take 1 capsule (20 mg total) by mouth once daily.    albuterol (VENTOLIN HFA) 90 mcg/actuation inhaler Inhale 2 puffs into the lungs every 6 (six) hours as needed for Wheezing. Rescue    benzonatate (TESSALON) 200 MG capsule Take 1 capsule (200 mg total) by mouth 3 (three) times daily as needed for Cough. (Patient not taking: Reported on 10/27/2023)    fluticasone propionate (FLONASE) 50 mcg/actuation nasal spray 2 sprays (100 mcg total) by Each Nostril route once daily.    guaiFENesin (MUCINEX) 600 mg 12 hr tablet Take 1 tablet (600 mg total) by mouth 2 (two) times daily as needed for Congestion. (Patient not taking: Reported on 10/27/2023)    sod bicarb-sod chlor-neti pot (SINUS WASH NETI POT) pkdv Use three times daily as needed for sinus congestion       Review of patient's allergies indicates:  No Known Allergies    Past Medical History:   Diagnosis Date    Anemia  4/16/2015    Coronary artery disease     GERD (gastroesophageal reflux disease)     Hyperlipidemia     Hypertension     Microcytic anemia 4/16/2015    Positive PPD 4/16/2015    Vitamin D deficiency disease 4/16/2015     Past Surgical History:   Procedure Laterality Date    BREAST BIOPSY      BREAST CYST EXCISION      CATARACT EXTRACTION, BILATERAL Bilateral 02/2019    COLONOSCOPY N/A 6/15/2018    Procedure: COLONOSCOPY;  Surgeon: Mikael Wilde MD;  Location: [facility];  Service: Endoscopy;  Laterality: N/A;  confirmed-ss    HYSTERECTOMY      PARTIAL HYSTERECTOMY      over 21 years ago     Family History       Problem Relation (Age of Onset)    Cancer Brother    Heart disease Father, Brother    Hyperlipidemia Father, Mother    Kidney disease Mother          Tobacco Use    Smoking status: Never     Passive exposure: Never    Smokeless tobacco: Never   Substance and Sexual Activity    Alcohol use: Not Currently     Comment: occasional drinking    Drug use: No    Sexual activity: Yes     Partners: Male     Review of Systems   All other systems reviewed and are negative.    Objective:     Vital Signs (Most Recent):  Temp: 97.5 °F (36.4 °C) (11/10/23 0724)  Pulse: (!) 18 (11/10/23 0724)  Resp: 16 (11/10/23 0724)  BP: (!) 181/80 (11/10/23 0724)  SpO2: 98 % (11/10/23 0724) Vital Signs (24h Range):  Temp:  [97.5 °F (36.4 °C)] 97.5 °F (36.4 °C)  Pulse:  [18] 18  Resp:  [16] 16  SpO2:  [98 %] 98 %  BP: (181)/(80) 181/80     Weight: 77.1 kg (170 lb)  Body mass index is 26.63 kg/m².    Physical Exam  Vitals reviewed.   Constitutional:       Appearance: Normal appearance.   HENT:      Head: Normocephalic and atraumatic.      Mouth/Throat:      Mouth: Mucous membranes are moist.   Eyes:      Extraocular Movements: Extraocular movements intact.      Pupils: Pupils are equal, round, and reactive to light.   Cardiovascular:      Rate and Rhythm: Normal rate and regular rhythm.   Pulmonary:      Effort: Pulmonary effort is normal.       Breath sounds: Normal breath sounds.   Abdominal:      General: Abdomen is flat.      Palpations: Abdomen is soft.   Musculoskeletal:         General: Normal range of motion.   Skin:     General: Skin is warm and dry.   Neurological:      General: No focal deficit present.      Mental Status: She is alert and oriented to person, place, and time.   Psychiatric:         Mood and Affect: Mood normal.         Behavior: Behavior normal.         Significant Labs:  None    Significant Diagnostics:  None    Assessment/Plan:     There are no hospital problems to display for this patient.    Ms. Clifton presents for colonoscopy    - consent signed  - proceed    Felicia Daniels MD  Colorectal Surgery  [facility] 4th [facility]

## 2023-11-10 ENCOUNTER — HOSPITAL ENCOUNTER (OUTPATIENT)
Facility: HOSPITAL | Age: 63
Discharge: HOME | End: 2023-11-10
Attending: SURGERY | Admitting: SURGERY
Payer: MEDICARE

## 2023-11-10 ENCOUNTER — ANESTHESIA EVENT (OUTPATIENT)
Dept: ENDOSCOPY | Facility: HOSPITAL | Age: 63
End: 2023-11-10
Payer: MEDICARE

## 2023-11-10 ENCOUNTER — ANESTHESIA (OUTPATIENT)
Dept: ENDOSCOPY | Facility: HOSPITAL | Age: 63
End: 2023-11-10
Payer: MEDICARE

## 2023-11-10 VITALS
RESPIRATION RATE: 16 BRPM | WEIGHT: 170 LBS | SYSTOLIC BLOOD PRESSURE: 149 MMHG | HEART RATE: 75 BPM | HEIGHT: 67 IN | BODY MASS INDEX: 26.68 KG/M2 | TEMPERATURE: 98 F | DIASTOLIC BLOOD PRESSURE: 71 MMHG | OXYGEN SATURATION: 96 %

## 2023-11-10 DIAGNOSIS — Z12.11 ENCOUNTER FOR SCREENING COLONOSCOPY: ICD-10-CM

## 2023-11-10 PROCEDURE — 63600175 PHARM REV CODE 636 W HCPCS: Mod: HCNC | Performed by: NURSE ANESTHETIST, CERTIFIED REGISTERED

## 2023-11-10 PROCEDURE — 37000009 HC ANESTHESIA EA ADD 15 MINS: Mod: HCNC | Performed by: SURGERY

## 2023-11-10 PROCEDURE — G0105 COLORECTAL SCRN; HI RISK IND: HCPCS | Mod: HCNC | Performed by: SURGERY

## 2023-11-10 PROCEDURE — G0105 COLORECTAL SCRN; HI RISK IND: ICD-10-PCS | Mod: HCNC,,, | Performed by: SURGERY

## 2023-11-10 PROCEDURE — 25000003 PHARM REV CODE 250: Mod: HCNC | Performed by: NURSE ANESTHETIST, CERTIFIED REGISTERED

## 2023-11-10 PROCEDURE — E9220 PRA ENDO ANESTHESIA: HCPCS | Mod: HCNC,,, | Performed by: NURSE ANESTHETIST, CERTIFIED REGISTERED

## 2023-11-10 PROCEDURE — E9220 PRA ENDO ANESTHESIA: ICD-10-PCS | Mod: HCNC,,, | Performed by: NURSE ANESTHETIST, CERTIFIED REGISTERED

## 2023-11-10 PROCEDURE — G0105 COLORECTAL SCRN; HI RISK IND: HCPCS | Mod: HCNC,,, | Performed by: SURGERY

## 2023-11-10 PROCEDURE — 37000008 HC ANESTHESIA 1ST 15 MINUTES: Mod: HCNC | Performed by: SURGERY

## 2023-11-10 RX ORDER — PROPOFOL 10 MG/ML
VIAL (ML) INTRAVENOUS CONTINUOUS PRN
Status: DISCONTINUED | OUTPATIENT
Start: 2023-11-10 | End: 2023-11-10

## 2023-11-10 RX ORDER — SODIUM CHLORIDE 9 MG/ML
INJECTION, SOLUTION INTRAVENOUS CONTINUOUS
Status: DISCONTINUED | OUTPATIENT
Start: 2023-11-10 | End: 2023-11-10 | Stop reason: HOSPADM

## 2023-11-10 RX ORDER — PROPOFOL 10 MG/ML
VIAL (ML) INTRAVENOUS
Status: DISCONTINUED | OUTPATIENT
Start: 2023-11-10 | End: 2023-11-10

## 2023-11-10 RX ORDER — LABETALOL HYDROCHLORIDE 5 MG/ML
INJECTION, SOLUTION INTRAVENOUS
Status: DISCONTINUED | OUTPATIENT
Start: 2023-11-10 | End: 2023-11-10

## 2023-11-10 RX ORDER — LIDOCAINE HYDROCHLORIDE 20 MG/ML
INJECTION INTRAVENOUS
Status: DISCONTINUED | OUTPATIENT
Start: 2023-11-10 | End: 2023-11-10

## 2023-11-10 RX ADMIN — PROPOFOL 150 MCG/KG/MIN: 10 INJECTION, EMULSION INTRAVENOUS at 07:11

## 2023-11-10 RX ADMIN — LABETALOL HYDROCHLORIDE 10 MG: 5 INJECTION, SOLUTION INTRAVENOUS at 08:11

## 2023-11-10 RX ADMIN — LIDOCAINE HYDROCHLORIDE 50 MG: 20 INJECTION INTRAVENOUS at 07:11

## 2023-11-10 RX ADMIN — SODIUM CHLORIDE: 9 INJECTION, SOLUTION INTRAVENOUS at 07:11

## 2023-11-10 RX ADMIN — PROPOFOL 80 MG: 10 INJECTION, EMULSION INTRAVENOUS at 07:11

## 2023-11-10 NOTE — ANESTHESIA PREPROCEDURE EVALUATION
11/10/2023  Linh Clifton is a 63 y.o., female.  Patient Active Problem List   Diagnosis    Chest pain    Hypertension    Allergic rhinitis, seasonal    Hyperlipidemia    Vitamin D deficiency disease    Microcytic anemia    CAD (coronary artery disease)    Positive PPD    Encounter for screening colonoscopy    Angioedema    Palpitations    FLETCHER (dyspnea on exertion)     Past Surgical History:   Procedure Laterality Date    BREAST BIOPSY      BREAST CYST EXCISION      CATARACT EXTRACTION, BILATERAL Bilateral 02/2019    COLONOSCOPY N/A 6/15/2018    Procedure: COLONOSCOPY;  Surgeon: Mikael Wilde MD;  Location: [facility];  Service: Endoscopy;  Laterality: N/A;  confirmed-ss    HYSTERECTOMY      PARTIAL HYSTERECTOMY      over 21 years ago     Results for orders placed or performed in visit on 10/20/21   IN OFFICE EKG 12-LEAD (to [place])    Collection Time: 10/20/21  2:54 PM    Narrative    Test Reason : I25.10,E78.5,I10,R07.9,R00.2,R06.00,    Vent. Rate : 079 BPM     Atrial Rate : 079 BPM     P-R Int : 150 ms          QRS Dur : 082 ms      QT Int : 372 ms       P-R-T Axes : 068 076 044 degrees     QTc Int : 426 ms    Normal sinus rhythm  Normal ECG  When compared with ECG of 05-FEB-2016 13:25,  No significant change was found  Confirmed by Harjinder BETHEA, Carole DELONG ([id number]) on 10/20/2021 10:58:49 PM    Referred By:  SHANE           Confirmed By:Carole Grande MD         Pre-op Assessment    I have reviewed the Patient Summary Reports.    I have reviewed the NPO Status.   I have reviewed the Medications.     Review of Systems  Anesthesia Hx:  No problems with previous Anesthesia                Hematology/Oncology:  Hematology Normal   Oncology Normal                                   EENT/Dental:  chronic allergic rhinitis           Cardiovascular:     Hypertension   CAD           hyperlipidemia FLETCHER                             Pulmonary:      Shortness of breath (On exertion)                  Renal/:  Renal/ Normal                 Hepatic/GI:     GERD             Musculoskeletal:  Musculoskeletal Normal                Neurological:  Neurology Normal                                      Endocrine:  Endocrine Normal            Dermatological:  Skin Normal    Psych:  Psychiatric Normal                    Physical Exam  General: Well nourished, Cooperative, Alert and Oriented    Airway:  Mallampati: II   Mouth Opening: Normal  TM Distance: Normal  Tongue: Normal  Neck ROM: Normal ROM    Dental:  Intact    Chest/Lungs:  Normal Respiratory Rate        Anesthesia Plan  Type of Anesthesia, risks & benefits discussed:    Anesthesia Type: Gen Natural Airway  Intra-op Monitoring Plan: Standard ASA Monitors  Post Op Pain Control Plan: multimodal analgesia  Induction:  IV  Informed Consent: Informed consent signed with the Patient and all parties understand the risks and agree with anesthesia plan.  All questions answered.   ASA Score: 2  Day of Surgery Review of History & Physical: H&P Update referred to the surgeon/provider.    Ready For Surgery From Anesthesia Perspective.     .

## 2023-11-10 NOTE — ANESTHESIA POSTPROCEDURE EVALUATION
Anesthesia Post Evaluation    Patient: Linh Clifton    Procedure(s) Performed: Procedure(s) (LRB):  COLONOSCOPY (N/A)    Final Anesthesia Type: general      Patient location during evaluation: GI PACU  Patient participation: Yes- Able to Participate  Level of consciousness: awake and alert  Post-procedure vital signs: reviewed and stable  Pain management: adequate  Airway patency: patent    PONV status at discharge: No PONV  Anesthetic complications: no      Cardiovascular status: blood pressure returned to baseline  Respiratory status: unassisted  Hydration status: euvolemic  Follow-up not needed.          Vitals Value Taken Time   /71 11/10/23 0842   Temp 36.7 °C (98 °F) 11/10/23 0814   Pulse 75 11/10/23 0842   Resp 16 11/10/23 0842   SpO2 96 % 11/10/23 0842         Event Time   Out of Recovery 08:45:26         Pain/Jackson Score: Jackson Score: 8 (11/10/2023  8:14 AM)

## 2023-11-10 NOTE — PROVATION PATIENT INSTRUCTIONS
Discharge Summary/Instructions after an Endoscopic Procedure  Patient Name: Linh Clifton  Patient MRN: [id number]  Patient YOB: 1960  Friday, November 10, 2023  Felicia Daniels MD  Dear patient,  As a result of recent federal legislation (The Federal Cures Act), you may   receive lab or pathology results from your procedure in your [facility]   account before your physician is able to contact you. Your physician or   their representative will relay the results to you with their   recommendations at their soonest availability.  Thank you,  RESTRICTIONS:  During your procedure today, you received medications for sedation.  These   medications may affect your judgment, balance and coordination.  Therefore,   for 24 hours, you have the following restrictions:   - DO NOT drive a car, operate machinery, make legal/financial decisions,   sign important papers or drink alcohol.    ACTIVITY:  Today: no heavy lifting, straining or running due to procedural   sedation/anesthesia.  The following day: return to full activity including work.  DIET:  Eat and drink normally unless instructed otherwise.     TREATMENT FOR COMMON SIDE EFFECTS:  - Mild abdominal pain, nausea, belching, bloating or excessive gas:  rest,   eat lightly and use a heating pad.  - Sore Throat: treat with throat lozenges and/or gargle with warm salt   water.  - Because air was used during the procedure, expelling large amounts of air   from your rectum or belching is normal.  - If a bowel prep was taken, you may not have a bowel movement for 1-3 days.    This is normal.  SYMPTOMS TO WATCH FOR AND REPORT TO YOUR PHYSICIAN:  1. Abdominal pain or bloating, other than gas cramps.  2. Chest pain.  3. Back pain.  4. Signs of infection such as: chills or fever occurring within 24 hours   after the procedure.  5. Rectal bleeding, which would show as bright red, maroon, or black stools.   (A tablespoon of blood from the rectum is not serious, especially if    hemorrhoids are present.)  6. Vomiting.  7. Weakness or dizziness.  GO DIRECTLY TO THE NEAREST EMERGENCY ROOM IF YOU HAVE ANY OF THE FOLLOWING:      Difficulty breathing              Chills and/or fever over 101 F   Persistent vomiting and/or vomiting blood   Severe abdominal pain   Severe chest pain   Black, tarry stools   Bleeding- more than one tablespoon   Any other symptom or condition that you feel may need urgent attention  Your doctor recommends these additional instructions:  If any biopsies were taken, your doctors clinic will contact you in 1 to 2   weeks with any results.  - Patient has a contact number available for emergencies.  The signs and   symptoms of potential delayed complications were discussed with the   patient.  Return to normal activities tomorrow.  Written discharge   instructions were provided to the patient.   - Resume previous diet.   - Continue present medications.   - Repeat colonoscopy in 7-10 years for surveillance.   - Discharge patient to home (ambulatory).  For questions, problems or results please call your physician - Felicia Daniels MD at Work:  [phone].  [facility], EMERGENCY ROOM PHONE NUMBER: [phone]  IF A COMPLICATION OR EMERGENCY SITUATION ARISES AND YOU ARE UNABLE TO REACH   YOUR PHYSICIAN - GO DIRECTLY TO THE EMERGENCY ROOM.  MD Felicia Cevallos MD  11/10/2023 8:08:19 AM  This report has been verified and signed electronically.  Dear patient,  As a result of recent federal legislation (The Federal Cures Act), you may   receive lab or pathology results from your procedure in your [facility]   account before your physician is able to contact you. Your physician or   their representative will relay the results to you with their   recommendations at their soonest availability.  Thank you,  PROVATION

## 2023-11-10 NOTE — TRANSFER OF CARE
"Anesthesia Transfer of Care Note    Patient: Linh Clifton    Procedure(s) Performed: Procedure(s) (LRB):  COLONOSCOPY (N/A)    Patient location: GI    Anesthesia Type: general    Transport from OR: Transported from OR on room air with adequate spontaneous ventilation    Post pain: adequate analgesia    Post assessment: no apparent anesthetic complications and tolerated procedure well    Post vital signs: stable    Level of consciousness: awake, alert and responds to stimulation    Nausea/Vomiting: no nausea/vomiting    Complications: none    Transfer of care protocol was followedComments: Report given to recovery RN.       Last vitals: Visit Vitals  BP (!) 126/64 (BP Location: Left arm, Patient Position: Lying)   Pulse 88   Temp 36.4 °C (97.5 °F) (Temporal)   Resp 18   Ht 5' 7" (1.702 m)   Wt 77.1 kg (170 lb)   SpO2 99%   Breastfeeding No   BMI 26.63 kg/m²     "

## 2023-11-23 RX ORDER — METOPROLOL SUCCINATE 25 MG/1
TABLET, EXTENDED RELEASE ORAL
Qty: 90 TABLET | Refills: 0 | Status: SHIPPED | OUTPATIENT
Start: 2023-11-23 | End: 2024-02-19

## 2023-11-23 NOTE — TELEPHONE ENCOUNTER
No care due was identified.  Health [facility] Embedded Care Due Messages. Reference number: [id number].   11/22/2023 10:40:51 PM CST

## 2023-11-30 DIAGNOSIS — K21.9 GASTROESOPHAGEAL REFLUX DISEASE: ICD-10-CM

## 2023-11-30 NOTE — TELEPHONE ENCOUNTER
No care due was identified.  Health [facility] Embedded Care Due Messages. Reference number: [id number].   11/30/2023 3:26:11 PM CST

## 2023-12-01 RX ORDER — HYDROCHLOROTHIAZIDE 25 MG/1
25 TABLET ORAL
Qty: 90 TABLET | Refills: 12 | Status: SHIPPED | OUTPATIENT
Start: 2023-12-01

## 2023-12-01 RX ORDER — OMEPRAZOLE 20 MG/1
20 CAPSULE, DELAYED RELEASE ORAL
Qty: 90 CAPSULE | Refills: 3 | Status: SHIPPED | OUTPATIENT
Start: 2023-12-01

## 2023-12-01 NOTE — TELEPHONE ENCOUNTER
Refill Routing Note   Medication(s) are not appropriate for processing by [facility] for the following reason(s):        Required vitals abnormal-BP  11/10/23 (!) 149/71       [facility] action(s):  Defer  Approve               Appointments  past 12m or future 3m with PCP    Date Provider   Last Visit   10/26/2023 Feliciano Chandler MD   Next Visit   Visit date not found Feliciano Chandler MD   ED visits in past 90 days: 0        Note composed:7:32 AM 12/01/2023

## 2023-12-25 NOTE — TELEPHONE ENCOUNTER
No care due was identified.  [facility] Embedded Care Due Messages. Reference number: [id number].   12/25/2023 9:23:35 AM CST

## 2023-12-26 RX ORDER — ATORVASTATIN CALCIUM 20 MG/1
20 TABLET, FILM COATED ORAL
Qty: 90 TABLET | Refills: 0 | Status: SHIPPED | OUTPATIENT
Start: 2023-12-26

## 2024-01-02 ENCOUNTER — HOSPITAL ENCOUNTER (EMERGENCY)
Facility: HOSPITAL | Age: 64
Discharge: HOME | End: 2024-01-02
Attending: EMERGENCY MEDICINE
Payer: MEDICARE

## 2024-01-02 VITALS
WEIGHT: 170 LBS | DIASTOLIC BLOOD PRESSURE: 78 MMHG | BODY MASS INDEX: 26.63 KG/M2 | SYSTOLIC BLOOD PRESSURE: 163 MMHG | TEMPERATURE: 98 F | OXYGEN SATURATION: 100 % | RESPIRATION RATE: 17 BRPM | HEART RATE: 68 BPM
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
## 2024-01-02 DIAGNOSIS — M79.642 PAIN IN BOTH HANDS: ICD-10-CM

## 2024-01-02 DIAGNOSIS — M25.562 LEFT KNEE PAIN, UNSPECIFIED CHRONICITY: ICD-10-CM

## 2024-01-02 DIAGNOSIS — M79.641 PAIN IN BOTH HANDS: ICD-10-CM

## 2024-01-02 PROCEDURE — 99284 EMERGENCY DEPT VISIT MOD MDM: CPT | Mod: HCNC,ER

## 2024-01-02 PROCEDURE — 96372 THER/PROPH/DIAG INJ SC/IM: CPT | Performed by: PHYSICIAN ASSISTANT

## 2024-01-02 PROCEDURE — 63600175 PHARM REV CODE 636 W HCPCS: Mod: HCNC,ER | Performed by: PHYSICIAN ASSISTANT

## 2024-01-02 RX ORDER — KETOROLAC TROMETHAMINE 30 MG/ML
15 INJECTION, SOLUTION INTRAMUSCULAR; INTRAVENOUS
Status: COMPLETED | OUTPATIENT
Start: 2024-01-02 | End: 2024-01-02

## 2024-01-02 RX ORDER — MELOXICAM 7.5 MG/1
7.5 TABLET ORAL DAILY
Qty: 7 TABLET | Refills: 0 | Status: SHIPPED | OUTPATIENT
Start: 2024-01-02 | End: 2024-01-09

## 2024-01-02 RX ADMIN — KETOROLAC TROMETHAMINE 15 MG: 30 INJECTION, SOLUTION INTRAMUSCULAR; INTRAVENOUS at 02:01

## 2024-01-02 NOTE — ED PROVIDER NOTES
Encounter Date: 1/2/2024    SCRIBE #1 NOTE: I, Katiuska Abernathy, am scribing for, and in the presence of,  Steffanie Zapata PA-C. I have scribed the following portions of the note - Other sections scribed: HPI, ROS, PE.       History     Chief Complaint   Patient presents with    Hand Injury     Pt fell on new years gris hurting bilateral hands and left knee      Linh Clifton is a 63 y.o. female, with a past medical history of CAD, HLD, and HTN, who presents to the ED with bilateral hand pain that is worse on the right, and left knee pain that began after a trip and fall 2 days ago. Patient reports she tripped and fell onto her hands. Patient reports taking ibuprofen yesterday, but none today. Patient states she is able to ambulate. Patient is right handed. Patient reports associated bruising to bilateral wrists. Patient denies head trauma, loss of consciousness, feeling dizzy or lightheaded prior to falling, or other associated symptoms. Patient denies blood thinners.       The history is provided by the patient. No  was used.     Review of patient's allergies indicates:  Not on File  Past Medical History:   Diagnosis Date    Anemia 4/16/2015    Coronary artery disease     GERD (gastroesophageal reflux disease)     Hyperlipidemia     Hypertension     Microcytic anemia 4/16/2015    Positive PPD 4/16/2015    Vitamin D deficiency disease 4/16/2015     Past Surgical History:   Procedure Laterality Date    BREAST BIOPSY      BREAST CYST EXCISION      CATARACT EXTRACTION, BILATERAL Bilateral 02/2019    COLONOSCOPY N/A 6/15/2018    Procedure: COLONOSCOPY;  Surgeon: Mikael Wilde MD;  Location: [facility];  Service: Endoscopy;  Laterality: N/A;  confirmed-ss    COLONOSCOPY N/A 11/10/2023    Procedure: COLONOSCOPY;  Surgeon: Felicia Daniels MD;  Location: [facility] ([address] [place] [address]);  Service: Endoscopy;  Laterality: N/A;  8/21-amb referral on chart, last colon 2018, history of colon polyps, instr to e-mail,  Suprep not covered, changed to PEG prep  9/27 r/s; updated instructions mailed-st  moved pt from [facility] to [facility]---[facility]  11/3-lvm for precall-[place]  11/8-precall complete-[facility]    HYSTERECTOMY      PARTIAL HYSTERECTOMY      over 21 years ago     Family History   Problem Relation Age of Onset    Heart disease Father     Hyperlipidemia Father     Hyperlipidemia Mother     Kidney disease Mother     Cancer Brother         pancreas    Heart disease Brother     Breast cancer Neg Hx     Colon cancer Neg Hx     Ovarian cancer Neg Hx      Social History     Tobacco Use    Smoking status: Never     Passive exposure: Never    Smokeless tobacco: Never   Substance Use Topics    Alcohol use: Not Currently     Comment: occasional drinking    Drug use: No     Review of Systems   Constitutional:  Negative for chills, fatigue and fever.   HENT:  Negative for congestion, sinus pressure, sinus pain, sneezing and sore throat.    Eyes:  Negative for visual disturbance.   Respiratory:  Negative for cough and shortness of breath.    Cardiovascular:  Negative for chest pain.   Gastrointestinal:  Negative for abdominal pain, nausea and vomiting.   Genitourinary:  Negative for difficulty urinating.   Musculoskeletal:  Positive for arthralgias (left knee). Negative for back pain.        (+) bilateral hand pain   Skin:  Negative for rash.        (+) bruising to bilateral wrists   Neurological:  Negative for syncope and headaches.       Physical Exam     Initial Vitals [01/02/24 1059]   BP Pulse Resp Temp SpO2   (!) 150/72 72 20 98.3 °F (36.8 °C) 97 %      MAP       --         Physical Exam    Nursing note and vitals reviewed.  Constitutional: She appears well-developed and well-nourished. She is not diaphoretic. No distress.   HENT:   Head: Normocephalic and atraumatic.   Right Ear: External ear normal.   Left Ear: External ear normal.   Cardiovascular:  Normal rate, regular rhythm and intact distal pulses.           Pulses:       Radial pulses are 2+ on  the right side and 2+ on the left side.   Pulmonary/Chest: Breath sounds normal. No respiratory distress.   Abdominal: Abdomen is soft. Bowel sounds are normal. She exhibits no distension. There is no abdominal tenderness.   Musculoskeletal:         General: No edema.      Right wrist: No snuff box tenderness.      Left wrist: No snuff box tenderness.      Right hand: Tenderness present. Decreased range of motion (due to pain).      Left hand: Tenderness present. Normal range of motion. Normal strength.      Left knee: No swelling or deformity. Normal range of motion. Tenderness (mild, anterior aspect of midline) present.      Comments: Tenderness and bruising to bilateral palmar aspects under thumbs.      Neurological: She is alert and oriented to person, place, and time. GCS score is 15. GCS eye subscore is 4. GCS verbal subscore is 5. GCS motor subscore is 6.   Skin: Skin is warm and dry. Capillary refill takes less than 2 seconds.   Psychiatric: She has a normal mood and affect. Her behavior is normal. Judgment normal.         ED Course   Procedures  Labs Reviewed - No data to display       Imaging Results              X-Ray Knee 3 View Left (Final result)  Result time 01/02/24 13:31:41      Final result by Sergio Brewster MD (01/02/24 13:31:41)                   Impression:      1. No acute displaced fracture or dislocation of the knee.      Electronically signed by: Sergio Brewster MD  Date:    01/02/2024  Time:    13:31               Narrative:    EXAMINATION:  XR KNEE 3 VIEW LEFT    CLINICAL HISTORY:  Unspecified fall, initial encounter    TECHNIQUE:  AP, lateral, and Merchant views of the left knee were performed.    COMPARISON:  None    FINDINGS:  Three views left knee.    No acute displaced fracture or dislocation of the knee.  No radiopaque foreign body.  No significant edema.  No large knee joint effusion.                                       X-Ray Hand 3 View Bilateral (Final result)  Result time  01/02/24 14:09:04      Final result by Sergio Brewster MD (01/02/24 14:09:04)                   Impression:      1. No acute displaced fracture or dislocation of the left or right hand.      Electronically signed by: Sergio Brewster MD  Date:    01/02/2024  Time:    14:09               Narrative:    EXAMINATION:  XR HAND COMPLETE 3 VIEWS BILATERAL    CLINICAL HISTORY:  fall;.    TECHNIQUE:  PA, lateral, and oblique views of both hands were performed.    COMPARISON:  None    FINDINGS:  Three views bilateral hands.    There are degenerative changes of the PIP and D IP joints.  There is mild edema about the dorsal aspect of the left hand.  No acute displaced fracture or dislocation of the left or right hand.  No radiopaque foreign body.                                       Medications   ketorolac injection 15 mg (15 mg Intramuscular Given 1/2/24 1420)     Medical Decision Making  Linh Clifton is a 63 y.o. female, with a past medical history of CAD, HLD, and HTN, who presents to the ED with bilateral hand pain that is worse on the right, and left knee pain that began after a trip and fall 2 days ago. Patient reports she tripped and fell onto her hands. Patient reports taking ibuprofen yesterday, but none today. Patient states she is able to ambulate. Patient is right handed. Patient reports associated bruising to bilateral wrists. Patient denies head trauma, loss of consciousness, feeling dizzy or lightheaded prior to falling, or other associated symptoms. Patient denies blood thinners. Exam above.  X-ray of the left knee in the bilateral hands resulted negative for acute fracture or dislocation.  Reviewed results with patient.  She was given 15 mg IM Toradol in the ED. Patient states her right hand is bothering her the most.  Therefore, Ace wrap was applied to the right hand for support and compression.  Patient was instructed to perform RICE therapy for 1 week.  Meloxicam prescription sent to her pharmacy.   Follow up with primary care doctor in week.  Return precautions discussed.    Of note: Differential diagnosis to include but not limited to:  Fracture, dislocation, sprain    Steffanie Zapata PA-C    DISCLAIMER: This note was prepared with [employer] voice recognition transcription software. Garbled syntax, mangled pronouns, and other bizarre constructions may be attributed to that software system. If you have any questions regarding information in this note please contact me.         Amount and/or Complexity of Data Reviewed  Radiology: ordered.    Risk  Prescription drug management.            Scribe Attestation:   Scribe #1: I performed the above scribed service and the documentation accurately describes the services I performed. I attest to the accuracy of the note.                             I, Steffanie Zapata, personally performed the services described in this documentation.  All medical record entries made by the scribe were at my direction and in my presence.  I have reviewed the chart and agree that the record reflects my personal performance and is accurate and complete.    Clinical Impression:  Final diagnoses:  [W19.XXXA] Fall (Primary)  [M79.641, M79.642] Pain in both hands  [M25.562] Left knee pain, unspecified chronicity          ED Disposition Condition    Discharge Stable          ED Prescriptions       Medication Sig Dispense Start Date End Date Auth. Provider    meloxicam (MOBIC) 7.5 MG tablet Take 1 tablet (7.5 mg total) by mouth once daily. for 7 days 7 tablet 1/2/2024 1/9/2024 Steffanie Zapata PA-C          Follow-up Information       Follow up With Specialties Details Why Contact Info    Feliciano Chandler MD Internal Medicine Schedule an appointment as soon as possible for a visit in 1 week For follow up [address] [facility] [id number]  [phone]      [facility] ED Emergency Medicine Go to  As needed, If symptoms worsen [address] [facility]  [id number]  [phone]             Steffanie Zapata PA-C  01/02/24 1747

## 2024-01-02 NOTE — DISCHARGE INSTRUCTIONS
Please take the prescribed medications according to the directions on the bottle.  Use the Ace wrap provided.  Alternate ice and heating pad for 15 minutes at a time multiple times a day to the areas of your body that hurt.  If your symptoms worsen you may return to the ED for reevaluation. Otherwise, please follow up with your primary care doctor within 1 week.

## 2024-01-02 NOTE — ED TRIAGE NOTES
Walking from [employer] game and slipped over flower bed. Fell forward and caught herself with hands. Denies hitting head, denies blood thinners. Pain to bilateral hands and wrists. Bruise to bilateral palms and wrists.

## 2024-01-02 NOTE — Clinical Note
"Linh Lucero" Etienne was seen and treated in our emergency department on 1/2/2024.  She may return to work on 01/03/2024.       If you have any questions or concerns, please don't hesitate to call.      Anahi Tellez RN    "

## 2024-02-18 NOTE — TELEPHONE ENCOUNTER
Care Due:                  Date            Visit Type   Department     Provider  --------------------------------------------------------------------------------                                EP [facility]                              PRIMARY      MED/ INTERNAL  Feliciano Castellano  Last Visit: 10-      CARE ([facility])   MED/ PEDS      Ehrensing  Next Visit: None Scheduled  None         None Found                                                            Last  Test          Frequency    Reason                     Performed    Due Date  --------------------------------------------------------------------------------    Vitamin D...  12 months..  ergocalciferol...........  06- 06-    [facility] Embedded Care Due Messages. Reference number: [id number].   2/18/2024 11:51:35 AM CST

## 2024-02-19 RX ORDER — METOPROLOL SUCCINATE 25 MG/1
TABLET, EXTENDED RELEASE ORAL
Qty: 90 TABLET | Refills: 0 | Status: SHIPPED | OUTPATIENT
Start: 2024-02-19 | End: 2024-05-17

## 2024-02-20 DIAGNOSIS — E55.9 VITAMIN D DEFICIENCY DISEASE: ICD-10-CM

## 2024-02-20 NOTE — TELEPHONE ENCOUNTER
No care due was identified.  Health [facility] Embedded Care Due Messages. Reference number: [id number].   2/20/2024 5:45:06 PM CST

## 2024-02-21 RX ORDER — ERGOCALCIFEROL 1.25 MG/1
CAPSULE ORAL
Qty: 12 CAPSULE | Refills: 0 | Status: SHIPPED | OUTPATIENT
Start: 2024-02-21 | End: 2024-05-13

## 2024-04-16 ENCOUNTER — OFFICE VISIT (OUTPATIENT)
Dept: FAMILY MEDICINE | Facility: CLINIC | Age: 64
End: 2024-04-16
Payer: MEDICARE

## 2024-04-16 VITALS
HEART RATE: 79 BPM | DIASTOLIC BLOOD PRESSURE: 68 MMHG | WEIGHT: 169 LBS | HEIGHT: 67 IN | BODY MASS INDEX: 26.53 KG/M2 | TEMPERATURE: 98 F | OXYGEN SATURATION: 98 % | SYSTOLIC BLOOD PRESSURE: 122 MMHG

## 2024-04-16 DIAGNOSIS — I10 HYPERTENSION, UNSPECIFIED TYPE: Chronic | ICD-10-CM

## 2024-04-16 DIAGNOSIS — M79.672 LEFT FOOT PAIN: Primary | ICD-10-CM

## 2024-04-16 DIAGNOSIS — E66.3 OVERWEIGHT (BMI 25.0-29.9): ICD-10-CM

## 2024-04-16 DIAGNOSIS — E78.5 HYPERLIPIDEMIA, UNSPECIFIED HYPERLIPIDEMIA TYPE: ICD-10-CM

## 2024-04-16 PROCEDURE — 99214 OFFICE O/P EST MOD 30 MIN: CPT | Mod: HCNC,S$GLB,, | Performed by: FAMILY MEDICINE

## 2024-04-16 PROCEDURE — 3074F SYST BP LT 130 MM HG: CPT | Mod: HCNC,CPTII,S$GLB, | Performed by: FAMILY MEDICINE

## 2024-04-16 PROCEDURE — 1159F MED LIST DOCD IN RCRD: CPT | Mod: HCNC,CPTII,S$GLB, | Performed by: FAMILY MEDICINE

## 2024-04-16 PROCEDURE — 3288F FALL RISK ASSESSMENT DOCD: CPT | Mod: HCNC,CPTII,S$GLB, | Performed by: FAMILY MEDICINE

## 2024-04-16 PROCEDURE — 3008F BODY MASS INDEX DOCD: CPT | Mod: HCNC,CPTII,S$GLB, | Performed by: FAMILY MEDICINE

## 2024-04-16 PROCEDURE — 1160F RVW MEDS BY RX/DR IN RCRD: CPT | Mod: HCNC,CPTII,S$GLB, | Performed by: FAMILY MEDICINE

## 2024-04-16 PROCEDURE — 99999 PR PBB SHADOW E&M-EST. PATIENT-LVL IV: CPT | Mod: PBBFAC,HCNC,, | Performed by: FAMILY MEDICINE

## 2024-04-16 PROCEDURE — 1126F AMNT PAIN NOTED NONE PRSNT: CPT | Mod: HCNC,CPTII,S$GLB, | Performed by: FAMILY MEDICINE

## 2024-04-16 PROCEDURE — 3078F DIAST BP <80 MM HG: CPT | Mod: HCNC,CPTII,S$GLB, | Performed by: FAMILY MEDICINE

## 2024-04-16 PROCEDURE — 1101F PT FALLS ASSESS-DOCD LE1/YR: CPT | Mod: HCNC,CPTII,S$GLB, | Performed by: FAMILY MEDICINE

## 2024-04-16 RX ORDER — ATORVASTATIN CALCIUM 20 MG/1
20 TABLET, FILM COATED ORAL NIGHTLY
Qty: 90 TABLET | Refills: 1 | Status: SHIPPED | OUTPATIENT
Start: 2024-04-16

## 2024-04-16 RX ORDER — NAPROXEN 500 MG/1
500 TABLET ORAL 2 TIMES DAILY WITH MEALS
Qty: 30 TABLET | Refills: 0 | Status: SHIPPED | OUTPATIENT
Start: 2024-04-16

## 2024-04-16 NOTE — PROGRESS NOTES
"  Physical Exam  /68   Pulse 79   Temp 98.1 °F (36.7 °C) (Oral)   Ht 5' 7" (1.702 m)   Wt 76.6 kg (168 lb 15.7 oz)   SpO2 98%   BMI 26.47 kg/m²      Office Visit    Patient Name: Linh Clifton    : 1960  MRN: [id number]      Assessment/Plan:  Linh Clifotn is a 63 y.o. female who presents today for :    Left foot pain  -     naproxen (NAPROSYN) 500 MG tablet; Take 1 tablet (500 mg total) by mouth 2 (two) times daily with meals.  Dispense: 30 tablet; Refill: 0  -improving, continue conservative therapy with activity modfication. Discussed adding NSAIDs and monitor for resolution. She may continue to use Epsom salt and Tylenol as needed. Discussed indications for imaging such as XR. F/u as needed    Hypertension  Hyperlipidemia  -     atorvastatin (LIPITOR) 20 MG tablet; Take 1 tablet (20 mg total) by mouth every evening.  Dispense: 90 tablet; Refill: 1  Overweight (BMI 25.0-29.9)  -continue current medication regimen  -DASH diet, regular cardiovascular exercises  -weight loss      Follow up for worsening Sx. Urgent care/ED precautions provided.        This note was created by combination of typed  and MModal dictation.  Transcription errors may be present.  If there are any questions, please contact me.        ----------------------------------------------------------------------------------------------------------------------      HPI:  Patient Care Team:  Feliciano Chandler MD as PCP - General (Internal Medicine)    Linh is a 63 y.o. female with      Patient Active Problem List   Diagnosis    Chest pain    Hypertension    Allergic rhinitis, seasonal    Hyperlipidemia    Vitamin D deficiency disease    Microcytic anemia    CAD (coronary artery disease)    Positive PPD    Encounter for screening colonoscopy    Angioedema    Palpitations    FLETCHER (dyspnea on exertion)     This patient is new to me      Linh presents today for:  PAIN IN FOOT and Foot Swelling    that started two days " ago after she got home from [employer] services. She denies any recent injury/trauma/falls but apparently had some swelling on the inside of the L foot behind her big toe. She denies any Hx of gout, and hadn't had any dietary triggers that she is aware of. She has been dipping her foot in epsom salt and taking OTC pain medication with significant relief, which has improved to about a 2/10 today as compared to 10/10 two days ago - swelling has also gone down. She denies any pain radiation.    Additional ROS    Negative review of system    except per HPI             Patient Active Problem List   Diagnosis    Chest pain    Hypertension    Allergic rhinitis, seasonal    Hyperlipidemia    Vitamin D deficiency disease    Microcytic anemia    CAD (coronary artery disease)    Positive PPD    Encounter for screening colonoscopy    Angioedema    Palpitations    FLETCHER (dyspnea on exertion)       Current Medications  Medications reviewed/updated.     Current Outpatient Medications on File Prior to Visit   Medication Sig Dispense Refill    aspirin 81 MG Chew Take 81 mg by mouth once daily.      cetirizine (ZYRTEC) 10 MG tablet Take 10 mg by mouth once daily.      ergocalciferol (ERGOCALCIFEROL) 50,000 unit Cap Take 1 capsule by mouth once a week 12 capsule 0    fluticasone propionate (FLONASE) 50 mcg/actuation nasal spray 2 sprays (100 mcg total) by Each Nostril route once daily. 11.1 mL 0    hydroCHLOROthiazide (HYDRODIURIL) 25 MG tablet Take 1 tablet by mouth once daily 90 tablet 12    metoprolol succinate (TOPROL-XL) 25 MG 24 hr tablet TAKE 1 TABLET BY MOUTH ONCE DAILY . APPOINTMENT REQUIRED FOR FUTURE REFILLS 90 tablet 0    omeprazole (PRILOSEC) 20 MG capsule Take 1 capsule by mouth once daily 90 capsule 3    sod bicarb-sod chlor-neti pot (SINUS WASH NETI POT) pkdv Use three times daily as needed for sinus congestion 1 each 0    [DISCONTINUED] atorvastatin (LIPITOR) 20 MG tablet Take 1 tablet by mouth once daily 90 tablet 0     albuterol (VENTOLIN HFA) 90 mcg/actuation inhaler Inhale 2 puffs into the lungs every 6 (six) hours as needed for Wheezing. Rescue 18 g 0    [DISCONTINUED] benzonatate (TESSALON) 200 MG capsule Take 1 capsule (200 mg total) by mouth 3 (three) times daily as needed for Cough. (Patient not taking: Reported on 10/27/2023) 15 capsule 0    [DISCONTINUED] guaiFENesin (MUCINEX) 600 mg 12 hr tablet Take 1 tablet (600 mg total) by mouth 2 (two) times daily as needed for Congestion. (Patient not taking: Reported on 10/27/2023) 20 tablet 0     No current facility-administered medications on file prior to visit.       Past Surgical History:   Procedure Laterality Date    BREAST BIOPSY      BREAST CYST EXCISION      CATARACT EXTRACTION, BILATERAL Bilateral 02/2019    COLONOSCOPY N/A 6/15/2018    Procedure: COLONOSCOPY;  Surgeon: Mikael Wilde MD;  Location: [facility] ENDO;  Service: Endoscopy;  Laterality: N/A;  confirmed-ss    COLONOSCOPY N/A 11/10/2023    Procedure: COLONOSCOPY;  Surgeon: Felicia Daniels MD;  Location: [facility] ([address] [place] [address]);  Service: Endoscopy;  Laterality: N/A;  8/21-amb referral on chart, last colon 2018, history of colon polyps, instr to e-mail, Suprep not covered, changed to PEG prep  9/27 r/s; updated instructions mailed-st  moved pt from [place] to [facility]---[facility]  11/3-lvm for precall-[place]  11/8-precall complete-[facility]    HYSTERECTOMY      PARTIAL HYSTERECTOMY      over 21 years ago       Family History   Problem Relation Name Age of Onset    Heart disease Father      Hyperlipidemia Father      Hyperlipidemia Mother      Kidney disease Mother      Cancer Brother          pancreas    Heart disease Brother      Breast cancer Neg Hx      Colon cancer Neg Hx      Ovarian cancer Neg Hx         Social History     Socioeconomic History    Marital status:    Occupational History    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     Employer: [employer]   Tobacco Use    Smoking status: Never     Passive exposure: Never    Smokeless tobacco:  "Never   Substance and Sexual Activity    Alcohol use: Not Currently     Comment: occasional drinking    Drug use: No    Sexual activity: Yes     Partners: Male           Allergies   Review of patient's allergies indicates:  No Known Allergies          Review of Systems  See HPI      [unfilled]  /68   Pulse 79   Temp 98.1 °F (36.7 °C) (Oral)   Ht 5' 7" (1.702 m)   Wt 76.6 kg (168 lb 15.7 oz)   SpO2 98%   BMI 26.47 kg/m²     GEN: NAD, pleasant  HEENT: NCAT, PERRLA, EOMI, sclera clear, anicteric  MSK: warm/well perfused, normal ROM in all extremities, no c/c/e. +mild TTP over the proximal base of the L great toe without any obvious edema nor erythema. Normal station/gait.                "

## 2024-05-10 DIAGNOSIS — E55.9 VITAMIN D DEFICIENCY DISEASE: ICD-10-CM

## 2024-05-11 NOTE — TELEPHONE ENCOUNTER
Care Due:                  Date            Visit Type   Department     Provider  --------------------------------------------------------------------------------                                EP [facility]                              PRIMARY      MED/ INTERNAL  Last Visit: 04-      CARE ([facility])   MED/ PEDS      Raghav Costello  Next Visit: None Scheduled  None         None Found                                                            Last  Test          Frequency    Reason                     Performed    Due Date  --------------------------------------------------------------------------------    Vitamin D...  12 months..  ergocalciferol...........  06- 06-    [facility] Embedded Care Due Messages. Reference number: [id number].   5/10/2024 8:09:47 PM CDT

## 2024-05-13 RX ORDER — ERGOCALCIFEROL 1.25 MG/1
CAPSULE ORAL
Qty: 12 CAPSULE | Refills: 0 | Status: SHIPPED | OUTPATIENT
Start: 2024-05-13

## 2024-05-16 NOTE — TELEPHONE ENCOUNTER
No care due was identified.  Health [facility] Embedded Care Due Messages. Reference number: [id number].   5/16/2024 9:27:26 AM CDT

## 2024-05-17 RX ORDER — METOPROLOL SUCCINATE 25 MG/1
TABLET, EXTENDED RELEASE ORAL
Qty: 90 TABLET | Refills: 0 | Status: SHIPPED | OUTPATIENT
Start: 2024-05-17

## 2024-05-28 ENCOUNTER — HOSPITAL ENCOUNTER (OUTPATIENT)
Dept: RADIOLOGY | Facility: HOSPITAL | Age: 64
Discharge: HOME | End: 2024-05-28
Attending: INTERNAL MEDICINE
Payer: MEDICARE

## 2024-05-28 VITALS — BODY MASS INDEX: 26.51 KG/M2 | WEIGHT: 168.88 LBS | HEIGHT: 67 IN

## 2024-05-28 DIAGNOSIS — Z12.31 ENCOUNTER FOR SCREENING MAMMOGRAM FOR BREAST CANCER: ICD-10-CM

## 2024-05-28 PROCEDURE — 77063 BREAST TOMOSYNTHESIS BI: CPT | Mod: TC,HCNC

## 2024-05-28 PROCEDURE — 77063 BREAST TOMOSYNTHESIS BI: CPT | Mod: 26,HCNC,, | Performed by: RADIOLOGY

## 2024-05-28 PROCEDURE — 77067 SCR MAMMO BI INCL CAD: CPT | Mod: 26,HCNC,, | Performed by: RADIOLOGY

## 2024-08-02 DIAGNOSIS — E55.9 VITAMIN D DEFICIENCY DISEASE: ICD-10-CM

## 2024-08-02 NOTE — TELEPHONE ENCOUNTER
Care Due:                  Date            Visit Type   Department     Provider  --------------------------------------------------------------------------------                                EP -         [facility] FAMILY                              PRIMARY      MED/ INTERNAL  Last Visit: 04-      CARE ([facility])   MED/ PEDS      Raghav Costello  Next Visit: None Scheduled  None         None Found                                                            Last  Test          Frequency    Reason                     Performed    Due Date  --------------------------------------------------------------------------------    CBC.........  12 months..  naproxen.................  10-   10-    CMP.........  12 months..  atorvastatin,              10-   10-                             ergocalciferol,                             hydroCHLOROthiazide,                             naproxen.................    Lipid Panel.  12 months..  atorvastatin.............  10-   10-    Vitamin D...  12 months..  ergocalciferol...........  Not Found    Overdue    [employer] Embedded Care Due Messages. Reference number: [id number].   8/02/2024 9:30:09 AM CDT

## 2024-08-03 RX ORDER — ERGOCALCIFEROL 1.25 MG/1
CAPSULE ORAL
Qty: 12 CAPSULE | Refills: 0 | Status: SHIPPED | OUTPATIENT
Start: 2024-08-03

## 2024-08-12 RX ORDER — METOPROLOL SUCCINATE 25 MG/1
TABLET, EXTENDED RELEASE ORAL
Qty: 90 TABLET | Refills: 0 | Status: SHIPPED | OUTPATIENT
Start: 2024-08-12

## 2024-08-12 NOTE — TELEPHONE ENCOUNTER
No care due was identified.  Health [facility] Embedded Care Due Messages. Reference number: [id number].   8/12/2024 9:10:50 AM CDT

## 2024-09-04 ENCOUNTER — OFFICE VISIT (OUTPATIENT)
Dept: FAMILY MEDICINE | Facility: CLINIC | Age: 64
End: 2024-09-04
Payer: MEDICARE

## 2024-09-04 VITALS
OXYGEN SATURATION: 98 % | RESPIRATION RATE: 17 BRPM | HEIGHT: 67 IN | HEART RATE: 72 BPM | WEIGHT: 170.63 LBS | SYSTOLIC BLOOD PRESSURE: 124 MMHG | TEMPERATURE: 98 F | BODY MASS INDEX: 26.78 KG/M2 | DIASTOLIC BLOOD PRESSURE: 72 MMHG

## 2024-09-04 DIAGNOSIS — M54.31 SCIATICA OF RIGHT SIDE: Primary | ICD-10-CM

## 2024-09-04 PROCEDURE — 99999 PR PBB SHADOW E&M-EST. PATIENT-LVL IV: CPT | Mod: PBBFAC,HCNC,,

## 2024-09-04 RX ORDER — KETOROLAC TROMETHAMINE 30 MG/ML
30 INJECTION, SOLUTION INTRAMUSCULAR; INTRAVENOUS
Status: COMPLETED | OUTPATIENT
Start: 2024-09-04 | End: 2024-09-04

## 2024-09-04 RX ORDER — METHOCARBAMOL 500 MG/1
500 TABLET, FILM COATED ORAL EVERY 12 HOURS PRN
Qty: 30 TABLET | Refills: 0 | Status: SHIPPED | OUTPATIENT
Start: 2024-09-04

## 2024-09-04 RX ORDER — TRIAMCINOLONE ACETONIDE 40 MG/ML
40 INJECTION, SUSPENSION INTRA-ARTICULAR; INTRAMUSCULAR
Status: COMPLETED | OUTPATIENT
Start: 2024-09-04 | End: 2024-09-04

## 2024-09-04 RX ADMIN — TRIAMCINOLONE ACETONIDE 40 MG: 40 INJECTION, SUSPENSION INTRA-ARTICULAR; INTRAMUSCULAR at 10:09

## 2024-09-04 RX ADMIN — KETOROLAC TROMETHAMINE 30 MG: 30 INJECTION, SOLUTION INTRAMUSCULAR; INTRAVENOUS at 10:09

## 2024-09-04 NOTE — PROGRESS NOTES
Family Medicine     Patient name: Linh Clifton  MRN: [id number]  : 1960  PCP NAME: Feliciano Chandler MD    Subjective     History of Present Illness:  Patient ID: Linh Clifton is a 64 y.o. Black or  female presents to the clinic today. Chronic medical issues, if present, have been documented.   Active Problem List with Overview Notes    Diagnosis Date Noted    Palpitations 10/20/2021    FLETCHER (dyspnea on exertion) 10/20/2021    Angioedema 06/10/2019     2019 swelling of lip/face - started prednisone, refer to Allergy       Encounter for screening colonoscopy 06/15/2018    Vitamin D deficiency disease 2015    Microcytic anemia 2015    CAD (coronary artery disease) 2015    Positive PPD 2015    Allergic rhinitis, seasonal 12/15/2014    Hyperlipidemia 12/15/2014    Hypertension 2012    Chest pain 2012     Dx updated per 2019 IMO Load         Chief Complaint  Chief Complaint   Patient presents with    Follow-up     Pt states she's been experiencing back pain and unable to bend and lift      Complains of back pain for the last 1 week.  Unable to bend or lift without discomfort pain is described as achy and spasms.  Worse on the right side.  Worsened by sneezing.  Has tried 2 days of ibuprofen with no relief.  Minimal radiation down the legs.  Has a history of sciatica on the other side.  No complaints of urinary of fecal incontinence.    Medications   Medication List with Changes/Refills   New Medications    METHOCARBAMOL (ROBAXIN) 500 MG TAB    Take 1 tablet (500 mg total) by mouth every 12 (twelve) hours as needed (back spasms).   Current Medications    ALBUTEROL (VENTOLIN HFA) 90 MCG/ACTUATION INHALER    Inhale 2 puffs into the lungs every 6 (six) hours as needed for Wheezing. Rescue    ASPIRIN 81 MG CHEW    Take 81 mg by mouth once daily.    ATORVASTATIN (LIPITOR) 20 MG TABLET    Take 1 tablet (20 mg total) by mouth every evening.     CETIRIZINE (ZYRTEC) 10 MG TABLET    Take 10 mg by mouth once daily.    ERGOCALCIFEROL (ERGOCALCIFEROL) 50,000 UNIT CAP    Take 1 capsule by mouth once a week    FLUTICASONE PROPIONATE (FLONASE) 50 MCG/ACTUATION NASAL SPRAY    2 sprays (100 mcg total) by Each Nostril route once daily.    HYDROCHLOROTHIAZIDE (HYDRODIURIL) 25 MG TABLET    Take 1 tablet by mouth once daily    METOPROLOL SUCCINATE (TOPROL-XL) 25 MG 24 HR TABLET    Take 1 tablet by mouth once daily    NAPROXEN (NAPROSYN) 500 MG TABLET    Take 1 tablet (500 mg total) by mouth 2 (two) times daily with meals.    OMEPRAZOLE (PRILOSEC) 20 MG CAPSULE    Take 1 capsule by mouth once daily    SOD BICARB-SOD CHLOR-NETI POT (SINUS WASH NETI POT) PKDV    Use three times daily as needed for sinus congestion       Allergies  Review of patient's allergies indicates:  No Known Allergies  Past Medical history  Past Medical History:   Diagnosis Date    Anemia 4/16/2015    Coronary artery disease     GERD (gastroesophageal reflux disease)     Hyperlipidemia     Hypertension     Microcytic anemia 4/16/2015    Positive PPD 4/16/2015    Vitamin D deficiency disease 4/16/2015          Surgical History  Past Surgical History:   Procedure Laterality Date    BREAST BIOPSY      BREAST CYST EXCISION      CATARACT EXTRACTION, BILATERAL Bilateral 02/2019    COLONOSCOPY N/A 6/15/2018    Procedure: COLONOSCOPY;  Surgeon: Mikael Wilde MD;  Location: [facility];  Service: Endoscopy;  Laterality: N/A;  confirmed-ss    COLONOSCOPY N/A 11/10/2023    Procedure: COLONOSCOPY;  Surgeon: Felicia Daniels MD;  Location: [address]);  Service: Endoscopy;  Laterality: N/A;  8/21-amb referral on chart, last colon 2018, history of colon polyps, instr to e-mail, Suprep not covered, changed to PEG prep  9/27 r/s; updated instructions mailed-st  moved pt from [facility] to [facility]---[facility]  11/3-lvm for precall-MS  11/8-precall complete-Kpvt    HYSTERECTOMY      PARTIAL HYSTERECTOMY      over 21 years ago  "    Family History  Family History   Problem Relation Name Age of Onset    Heart disease Father      Hyperlipidemia Father      Hyperlipidemia Mother      Kidney disease Mother      Cancer Brother          pancreas    Heart disease Brother      Breast cancer Neg Hx      Colon cancer Neg Hx      Ovarian cancer Neg Hx       Social History  Social History     Tobacco Use    Smoking status: Never     Passive exposure: Never    Smokeless tobacco: Never   Substance Use Topics    Alcohol use: Not Currently     Comment: occasional drinking    Drug use: No          Review of system     ROS  Negative except as mentioned above  Physical exam   Vital Signs  Vitals:    09/04/24 0936   BP: 124/72   BP Location: Left arm   Patient Position: Sitting   BP Method: Large (Manual)   Pulse: 72   Resp: 17   Temp: 98.2 °F (36.8 °C)   TempSrc: Oral   SpO2: 98%   Weight: 77.4 kg (170 lb 10.2 oz)   Height: 5' 7" (1.702 m)       Physical Exam  Vitals and nursing note reviewed.   Constitutional:       Appearance: Normal appearance.   Cardiovascular:      Rate and Rhythm: Normal rate.      Pulses: Normal pulses.      Heart sounds: Normal heart sounds.   Pulmonary:      Effort: Pulmonary effort is normal.      Breath sounds: Normal breath sounds.   Musculoskeletal:      Lumbar back: Positive right straight leg raise test.   Neurological:      Mental Status: She is alert and oriented to person, place, and time.   Psychiatric:         Mood and Affect: Mood normal.         Behavior: Behavior normal.         Wt Readings from Last 3 Encounters:   09/04/24 77.4 kg (170 lb 10.2 oz)   05/28/24 76.6 kg (168 lb 14 oz)   04/16/24 76.6 kg (168 lb 15.7 oz)          Body mass index is 26.73 kg/m².      Laboratory data and other diagnostic findings     Lab Results   Component Value Date    WBC 5.20 10/28/2023    HGB 10.0 (L) 10/28/2023    HCT 33.8 (L) 10/28/2023    MCV 67 (L) 10/28/2023     10/28/2023         Lab Results   Component Value Date    " CREATININE 0.8 10/28/2023    BUN 19 10/28/2023     10/28/2023    K 3.9 10/28/2023     10/28/2023    CO2 24 10/28/2023         Assessment and plan       ICD-10-CM ICD-9-CM   1. Sciatica of right side  M54.31 724.3        Sciatica of right side  Examination findings support acute exacerbation of chronic problem.  Discussed the need for back exercises.  Injection steroids and NSAID  -     methocarbamoL (ROBAXIN) 500 MG Tab; Take 1 tablet (500 mg total) by mouth every 12 (twelve) hours as needed (back spasms).  Dispense: 30 tablet; Refill: 0  -     triamcinolone acetonide injection 40 mg  -     ketorolac injection 30 mg            Future Appointments  Future Appointments   Date Time Provider Department Center   10/14/2024  1:00 PM Esther Thompson, [facility] [place]         Follow up if symptoms worsen or fail to improve. and PRN.      Ronnie Lara MD    This note was created by combination of typed  and MModal dictation.  Transcription errors may be present.  If there are any questions, please contact me.

## 2024-09-04 NOTE — PROGRESS NOTES
Health Maintenance Due   Topic     Pneumococcal Vaccines (Age 65+) (1 of 2 - PCV) Consult PCP     Influenza Vaccine (1) Consult PCP     COVID-19 Vaccine (4 - 2023-24 season) Not offered at this office

## 2024-09-04 NOTE — PROGRESS NOTES
Administered Ketorolac 30 mg IM to right upper outer quad gluteus.  Patient tolerated injection well, no adverse reactions noted.   Administered Kenalog 40 mg IM to left upper outer quad gluteus.  Patient tolerated injection well, no adverse reactions noted.

## 2024-09-17 ENCOUNTER — CLINICAL SUPPORT (OUTPATIENT)
Dept: FAMILY MEDICINE | Facility: CLINIC | Age: 64
End: 2024-09-17
Payer: MEDICARE

## 2024-09-17 DIAGNOSIS — Z23 INFLUENZA VACCINE ADMINISTERED: Primary | ICD-10-CM

## 2024-09-17 PROCEDURE — G0008 ADMIN INFLUENZA VIRUS VAC: HCPCS | Mod: HCNC,S$GLB,, | Performed by: INTERNAL MEDICINE

## 2024-09-17 PROCEDURE — 90656 IIV3 VACC NO PRSV 0.5 ML IM: CPT | Mod: HCNC,S$GLB,, | Performed by: INTERNAL MEDICINE

## 2024-09-17 PROCEDURE — 99999 PR PBB SHADOW E&M-EST. PATIENT-LVL I: CPT | Mod: PBBFAC,HCNC,,

## 2024-10-23 DIAGNOSIS — E78.5 HYPERLIPIDEMIA, UNSPECIFIED HYPERLIPIDEMIA TYPE: ICD-10-CM

## 2024-10-24 DIAGNOSIS — E55.9 VITAMIN D DEFICIENCY DISEASE: ICD-10-CM

## 2024-10-24 RX ORDER — ATORVASTATIN CALCIUM 20 MG/1
20 TABLET, FILM COATED ORAL NIGHTLY
Qty: 90 TABLET | Refills: 1 | Status: SHIPPED | OUTPATIENT
Start: 2024-10-24

## 2024-10-24 RX ORDER — ERGOCALCIFEROL 1.25 MG/1
50000 CAPSULE ORAL
Qty: 12 CAPSULE | Refills: 0 | Status: SHIPPED | OUTPATIENT
Start: 2024-10-24

## 2024-10-24 NOTE — TELEPHONE ENCOUNTER
Care Due:                  Date            Visit Type   Department     Provider  --------------------------------------------------------------------------------                                EP -         [facility] FAM[facility]                              PRIMARY      MED/ INTERNAL  Last Visit: 04-      CARE ([facility])   MED/ PEDS      Raghav BOOTH                              ANNUAL       [facility]                              CHECKUP/PHY  MED/ INTERNAL  Feliciano Castellano  Next Visit: 10-      S            MED/ PEDS      Ehren[place]                                                            Last  Test          Frequency    Reason                     Performed    Due Date  --------------------------------------------------------------------------------    CBC.........  12 months..  naproxen.................  10-   10-    CMP.........  12 months..  atorvastatin,              10-   10-                             ergocalciferol,                             hydroCHLOROthiazide,                             naproxen.................    Lipid Panel.  12 months..  atorvastatin.............  10-   10-    Vitamin D...  12 months..  ergocalciferol...........  Not Found    Overdue    [employer] Embedded Care Due Messages. Reference number: [id number].   10/23/2024 7:43:57 PM CDT

## 2024-10-24 NOTE — TELEPHONE ENCOUNTER
No care due was identified.  [facility] Embedded Care Due Messages. Reference number: [id number].   10/24/2024 10:43:35 AM CDT

## 2024-10-29 ENCOUNTER — OFFICE VISIT (OUTPATIENT)
Dept: FAMILY MEDICINE | Facility: CLINIC | Age: 64
End: 2024-10-29
Payer: MEDICARE

## 2024-10-29 VITALS
BODY MASS INDEX: 26.53 KG/M2 | HEART RATE: 82 BPM | OXYGEN SATURATION: 98 % | TEMPERATURE: 98 F | WEIGHT: 169.06 LBS | HEIGHT: 67 IN | SYSTOLIC BLOOD PRESSURE: 128 MMHG | DIASTOLIC BLOOD PRESSURE: 62 MMHG

## 2024-10-29 DIAGNOSIS — D50.9 MICROCYTIC ANEMIA: ICD-10-CM

## 2024-10-29 DIAGNOSIS — Z12.12 SCREENING FOR COLORECTAL CANCER: ICD-10-CM

## 2024-10-29 DIAGNOSIS — I10 PRIMARY HYPERTENSION: ICD-10-CM

## 2024-10-29 DIAGNOSIS — K21.9 GASTROESOPHAGEAL REFLUX DISEASE, UNSPECIFIED WHETHER ESOPHAGITIS PRESENT: ICD-10-CM

## 2024-10-29 DIAGNOSIS — Z12.31 ENCOUNTER FOR SCREENING MAMMOGRAM FOR BREAST CANCER: ICD-10-CM

## 2024-10-29 DIAGNOSIS — Z00.00 ENCOUNTER FOR MEDICARE ANNUAL WELLNESS EXAM: ICD-10-CM

## 2024-10-29 DIAGNOSIS — I25.10 CORONARY ARTERY DISEASE INVOLVING NATIVE CORONARY ARTERY OF NATIVE HEART WITHOUT ANGINA PECTORIS: ICD-10-CM

## 2024-10-29 DIAGNOSIS — E78.5 HYPERLIPIDEMIA, UNSPECIFIED HYPERLIPIDEMIA TYPE: ICD-10-CM

## 2024-10-29 DIAGNOSIS — D56.3 BETA THALASSEMIA TRAIT: ICD-10-CM

## 2024-10-29 DIAGNOSIS — Z00.00 ROUTINE MEDICAL EXAM: Primary | ICD-10-CM

## 2024-10-29 DIAGNOSIS — Z12.11 SCREENING FOR COLORECTAL CANCER: ICD-10-CM

## 2024-10-29 DIAGNOSIS — L65.9 HAIR LOSS: ICD-10-CM

## 2024-10-29 DIAGNOSIS — R73.9 HYPERGLYCEMIA: ICD-10-CM

## 2024-10-29 DIAGNOSIS — E55.9 VITAMIN D DEFICIENCY DISEASE: ICD-10-CM

## 2024-10-29 DIAGNOSIS — N64.4 BREAST PAIN: ICD-10-CM

## 2024-10-29 DIAGNOSIS — D64.9 ANEMIA, UNSPECIFIED TYPE: ICD-10-CM

## 2024-10-29 DIAGNOSIS — E61.1 IRON DEFICIENCY: ICD-10-CM

## 2024-10-29 PROCEDURE — 1125F AMNT PAIN NOTED PAIN PRSNT: CPT | Mod: HCNC,CPTII,S$GLB, | Performed by: INTERNAL MEDICINE

## 2024-10-29 PROCEDURE — 3074F SYST BP LT 130 MM HG: CPT | Mod: HCNC,CPTII,S$GLB, | Performed by: INTERNAL MEDICINE

## 2024-10-29 PROCEDURE — 3078F DIAST BP <80 MM HG: CPT | Mod: HCNC,CPTII,S$GLB, | Performed by: INTERNAL MEDICINE

## 2024-10-29 PROCEDURE — 99214 OFFICE O/P EST MOD 30 MIN: CPT | Mod: 25,HCNC,S$GLB, | Performed by: INTERNAL MEDICINE

## 2024-10-29 PROCEDURE — 99999 PR PBB SHADOW E&M-EST. PATIENT-LVL IV: CPT | Mod: PBBFAC,HCNC,, | Performed by: INTERNAL MEDICINE

## 2024-10-29 PROCEDURE — 1159F MED LIST DOCD IN RCRD: CPT | Mod: HCNC,CPTII,S$GLB, | Performed by: INTERNAL MEDICINE

## 2024-10-29 PROCEDURE — 3008F BODY MASS INDEX DOCD: CPT | Mod: HCNC,CPTII,S$GLB, | Performed by: INTERNAL MEDICINE

## 2024-10-29 PROCEDURE — 1101F PT FALLS ASSESS-DOCD LE1/YR: CPT | Mod: HCNC,CPTII,S$GLB, | Performed by: INTERNAL MEDICINE

## 2024-10-29 PROCEDURE — 3288F FALL RISK ASSESSMENT DOCD: CPT | Mod: HCNC,CPTII,S$GLB, | Performed by: INTERNAL MEDICINE

## 2024-10-29 PROCEDURE — 99397 PER PM REEVAL EST PAT 65+ YR: CPT | Mod: 25,HCNC,S$GLB, | Performed by: INTERNAL MEDICINE

## 2024-10-30 ENCOUNTER — LAB VISIT (OUTPATIENT)
Dept: LAB | Facility: HOSPITAL | Age: 64
End: 2024-10-30
Attending: INTERNAL MEDICINE
Payer: MEDICARE

## 2024-10-30 DIAGNOSIS — E61.1 IRON DEFICIENCY: ICD-10-CM

## 2024-10-30 DIAGNOSIS — E78.5 HYPERLIPIDEMIA, UNSPECIFIED HYPERLIPIDEMIA TYPE: ICD-10-CM

## 2024-10-30 DIAGNOSIS — L65.9 HAIR LOSS: ICD-10-CM

## 2024-10-30 DIAGNOSIS — Z00.00 ROUTINE MEDICAL EXAM: ICD-10-CM

## 2024-10-30 DIAGNOSIS — D64.9 ANEMIA, UNSPECIFIED TYPE: ICD-10-CM

## 2024-10-30 DIAGNOSIS — R73.9 HYPERGLYCEMIA: ICD-10-CM

## 2024-10-30 DIAGNOSIS — D50.9 MICROCYTIC ANEMIA: ICD-10-CM

## 2024-10-30 LAB
ALBUMIN SERPL BCP-MCNC: 3.5 G/DL (ref 3.5–5.2)
ALP SERPL-CCNC: 152 U/L (ref 40–150)
ALT SERPL W/O P-5'-P-CCNC: 17 U/L (ref 10–44)
ANION GAP SERPL CALC-SCNC: 9 MMOL/L (ref 8–16)
AST SERPL-CCNC: 21 U/L (ref 10–40)
BASOPHILS # BLD AUTO: 0.04 K/UL (ref 0–0.2)
BASOPHILS NFR BLD: 0.9 % (ref 0–1.9)
BILIRUB SERPL-MCNC: 0.4 MG/DL (ref 0.1–1)
BUN SERPL-MCNC: 16 MG/DL (ref 8–23)
CALCIUM SERPL-MCNC: 9.5 MG/DL (ref 8.7–10.5)
CHLORIDE SERPL-SCNC: 103 MMOL/L (ref 95–110)
CHOLEST SERPL-MCNC: 171 MG/DL (ref 120–199)
CHOLEST/HDLC SERPL: 2.8 {RATIO} (ref 2–5)
CO2 SERPL-SCNC: 30 MMOL/L (ref 23–29)
CREAT SERPL-MCNC: 0.8 MG/DL (ref 0.5–1.4)
DHEA-S SERPL-MCNC: 25.1 UG/DL (ref 33.6–78.9)
DIFFERENTIAL METHOD BLD: ABNORMAL
EOSINOPHIL # BLD AUTO: 0.1 K/UL (ref 0–0.5)
EOSINOPHIL NFR BLD: 1.9 % (ref 0–8)
ERYTHROCYTE [DISTWIDTH] IN BLOOD BY AUTOMATED COUNT: 18.8 % (ref 11.5–14.5)
EST. GFR  (NO RACE VARIABLE): >60 ML/MIN/1.73 M^2
ESTIMATED AVG GLUCOSE: 120 MG/DL (ref 68–131)
FERRITIN SERPL-MCNC: 7 NG/ML (ref 20–300)
GLUCOSE SERPL-MCNC: 98 MG/DL (ref 70–110)
HBA1C MFR BLD: 5.8 % (ref 4–5.6)
HCT VFR BLD AUTO: 33.4 % (ref 37–48.5)
HDLC SERPL-MCNC: 62 MG/DL (ref 40–75)
HDLC SERPL: 36.3 % (ref 20–50)
HGB BLD-MCNC: 9.8 G/DL (ref 12–16)
IMM GRANULOCYTES # BLD AUTO: 0.02 K/UL (ref 0–0.04)
IMM GRANULOCYTES NFR BLD AUTO: 0.4 % (ref 0–0.5)
IRON SERPL-MCNC: 91 UG/DL (ref 30–160)
LDLC SERPL CALC-MCNC: 94 MG/DL (ref 63–159)
LYMPHOCYTES # BLD AUTO: 1.7 K/UL (ref 1–4.8)
LYMPHOCYTES NFR BLD: 35.6 % (ref 18–48)
MCH RBC QN AUTO: 19.3 PG (ref 27–31)
MCHC RBC AUTO-ENTMCNC: 29.3 G/DL (ref 32–36)
MCV RBC AUTO: 66 FL (ref 82–98)
MONOCYTES # BLD AUTO: 0.3 K/UL (ref 0.3–1)
MONOCYTES NFR BLD: 6.7 % (ref 4–15)
NEUTROPHILS # BLD AUTO: 2.5 K/UL (ref 1.8–7.7)
NEUTROPHILS NFR BLD: 54.5 % (ref 38–73)
NONHDLC SERPL-MCNC: 109 MG/DL
NRBC BLD-RTO: 0 /100 WBC
PLATELET # BLD AUTO: 220 K/UL (ref 150–450)
PMV BLD AUTO: ABNORMAL FL (ref 9.2–12.9)
POTASSIUM SERPL-SCNC: 4 MMOL/L (ref 3.5–5.1)
PROT SERPL-MCNC: 7.3 G/DL (ref 6–8.4)
RBC # BLD AUTO: 5.08 M/UL (ref 4–5.4)
SATURATED IRON: 20 % (ref 20–50)
SODIUM SERPL-SCNC: 142 MMOL/L (ref 136–145)
TOTAL IRON BINDING CAPACITY: 460 UG/DL (ref 250–450)
TRANSFERRIN SERPL-MCNC: 311 MG/DL (ref 200–375)
TRIGL SERPL-MCNC: 75 MG/DL (ref 30–150)
TSH SERPL DL<=0.005 MIU/L-ACNC: 0.97 UIU/ML (ref 0.4–4)
WBC # BLD AUTO: 4.66 K/UL (ref 3.9–12.7)

## 2024-10-30 PROCEDURE — 83036 HEMOGLOBIN GLYCOSYLATED A1C: CPT | Mod: HCNC | Performed by: INTERNAL MEDICINE

## 2024-10-30 PROCEDURE — 84402 ASSAY OF FREE TESTOSTERONE: CPT | Mod: HCNC | Performed by: INTERNAL MEDICINE

## 2024-10-30 PROCEDURE — 82627 DEHYDROEPIANDROSTERONE: CPT | Mod: HCNC | Performed by: INTERNAL MEDICINE

## 2024-10-30 PROCEDURE — 80053 COMPREHEN METABOLIC PANEL: CPT | Mod: HCNC | Performed by: INTERNAL MEDICINE

## 2024-10-30 PROCEDURE — 36415 COLL VENOUS BLD VENIPUNCTURE: CPT | Mod: HCNC,PO | Performed by: INTERNAL MEDICINE

## 2024-10-30 PROCEDURE — 85025 COMPLETE CBC W/AUTO DIFF WBC: CPT | Mod: HCNC | Performed by: INTERNAL MEDICINE

## 2024-10-30 PROCEDURE — 80061 LIPID PANEL: CPT | Mod: HCNC | Performed by: INTERNAL MEDICINE

## 2024-10-30 PROCEDURE — 84443 ASSAY THYROID STIM HORMONE: CPT | Mod: HCNC | Performed by: INTERNAL MEDICINE

## 2024-10-30 PROCEDURE — 83540 ASSAY OF IRON: CPT | Mod: HCNC | Performed by: INTERNAL MEDICINE

## 2024-10-30 PROCEDURE — 82728 ASSAY OF FERRITIN: CPT | Mod: HCNC | Performed by: INTERNAL MEDICINE

## 2024-11-04 LAB — TESTOST FREE SERPL-MCNC: 0.5 PG/ML

## 2024-11-11 RX ORDER — METOPROLOL SUCCINATE 25 MG/1
TABLET, EXTENDED RELEASE ORAL
Qty: 90 TABLET | Refills: 3 | Status: SHIPPED | OUTPATIENT
Start: 2024-11-11

## 2024-11-11 NOTE — TELEPHONE ENCOUNTER
Refill Decision Note   Linh Clifton  is requesting a refill authorization.  Brief Assessment and Rationale for Refill:  Approve     Medication Therapy Plan:         Comments:     Note composed:2:09 PM 11/11/2024

## 2024-11-11 NOTE — TELEPHONE ENCOUNTER
No care due was identified.  [facility] Embedded Care Due Messages. Reference number: [id number].   11/11/2024 9:51:17 AM CST

## 2024-11-14 ENCOUNTER — OFFICE VISIT (OUTPATIENT)
Dept: SURGERY | Facility: CLINIC | Age: 64
End: 2024-11-14
Payer: MEDICARE

## 2024-11-14 VITALS
WEIGHT: 169 LBS | HEIGHT: 67 IN | DIASTOLIC BLOOD PRESSURE: 79 MMHG | HEART RATE: 99 BPM | SYSTOLIC BLOOD PRESSURE: 145 MMHG | BODY MASS INDEX: 26.53 KG/M2 | OXYGEN SATURATION: 100 %

## 2024-11-14 DIAGNOSIS — Z12.39 ENCOUNTER FOR SCREENING BREAST EXAMINATION: ICD-10-CM

## 2024-11-14 DIAGNOSIS — N64.4 BREAST PAIN: Primary | ICD-10-CM

## 2024-11-14 PROCEDURE — 99999 PR PBB SHADOW E&M-EST. PATIENT-LVL IV: CPT | Mod: PBBFAC,HCNC,, | Performed by: NURSE PRACTITIONER

## 2024-11-14 NOTE — PROGRESS NOTES
"[facility]  Department of Surgery      REFERRING PROVIDER:   Feliciano Chandler MD  [address] [place] [address]    Chief Complaint: New patient       Subjective:      Patient ID: Linh Clifton is a 64 y.o. female who presents with left breast pain x 1 month. This is new. Describes the pain as a "nagging feeling". Reports pain is 4/10. Denies skin changes or palpating a breast mass. Does report weight gain.     Patient does routinely do self breast exams.  Patient has noted a change on breast exam.  Patient denies nipple discharge. Patient admits to to previous breast biopsy about 20 years ago - cyst removed of the left breast. Patient denies a personal history of breast cancer.    GYN History:  Age of menarche was 12.   Age of menopause was 31, underwent partial hysterectomy    Patient denies hormonal therapy.   Patient is .   Age of first live birth was 23.   Patient did not breast feed.    Family History:   Paternal 1st cousin - Breast Cancer  Brother - Pancreatic Cancer  Brother - Lung Cancer     Past Medical History:   Diagnosis Date    Anemia 2015    Coronary artery disease     GERD (gastroesophageal reflux disease)     Hyperlipidemia     Hypertension     Microcytic anemia 2015    Positive PPD 2015    Vitamin D deficiency disease 2015     Past Surgical History:   Procedure Laterality Date    BREAST BIOPSY      BREAST CYST EXCISION      CATARACT EXTRACTION, BILATERAL Bilateral 2019    COLONOSCOPY N/A 6/15/2018    Procedure: COLONOSCOPY;  Surgeon: Mikael Wilde MD;  Location: [facility];  Service: Endoscopy;  Laterality: N/A;  confirmed-ss    COLONOSCOPY N/A 11/10/2023    Procedure: COLONOSCOPY;  Surgeon: Felicia Daniels MD;  Location: [address]);  Service: Endoscopy;  Laterality: N/A;  -amb referral on chart, last colon , history of colon polyps, instr to e-mail, Suprep not covered, changed to PEG prep   r/s; updated instructions " mailed-st  moved pt from [place] to [facility]---KP  11/3-lvm for precall-MS  11/8-precall complete-Kpvt    HYSTERECTOMY      PARTIAL HYSTERECTOMY      over 21 years ago     Current Outpatient Medications on File Prior to Visit   Medication Sig Dispense Refill    aspirin 81 MG Chew Take 81 mg by mouth once daily.      atorvastatin (LIPITOR) 20 MG tablet Take 1 tablet (20 mg total) by mouth every evening. 90 tablet 1    cetirizine (ZYRTEC) 10 MG tablet Take 10 mg by mouth once daily. PRN      ergocalciferol (ERGOCALCIFEROL) 50,000 unit Cap Take 1 capsule (50,000 Units total) by mouth every 7 days. 12 capsule 0    fluticasone propionate (FLONASE) 50 mcg/actuation nasal spray 2 sprays (100 mcg total) by Each Nostril route once daily. 11.1 mL 0    hydroCHLOROthiazide (HYDRODIURIL) 25 MG tablet Take 1 tablet by mouth once daily 90 tablet 12    methocarbamoL (ROBAXIN) 500 MG Tab Take 1 tablet (500 mg total) by mouth every 12 (twelve) hours as needed (back spasms). 30 tablet 0    metoprolol succinate (TOPROL-XL) 25 MG 24 hr tablet Take 1 tablet by mouth once daily 90 tablet 3    omeprazole (PRILOSEC) 20 MG capsule Take 1 capsule by mouth once daily 90 capsule 3    sod bicarb-sod chlor-neti pot (SINUS WASH NETI POT) pkdv Use three times daily as needed for sinus congestion 1 each 0    albuterol (VENTOLIN HFA) 90 mcg/actuation inhaler Inhale 2 puffs into the lungs every 6 (six) hours as needed for Wheezing. Rescue 18 g 0    naproxen (NAPROSYN) 500 MG tablet Take 1 tablet (500 mg total) by mouth 2 (two) times daily with meals. 30 tablet 0     No current facility-administered medications on file prior to visit.     Social History     Socioeconomic History    Marital status:    Occupational History    Occupation: [employer]     Employer: [employer]   Tobacco Use    Smoking status: Never     Passive exposure: Never    Smokeless tobacco: Never   Substance and Sexual Activity    Alcohol use: Not Currently     Comment:  "occasional drinking    Drug use: No    Sexual activity: Yes     Partners: Male     Social Drivers of Health     Financial Resource Strain: Medium Risk (9/3/2024)    Overall Financial Resource Strain (CARDIA)     Difficulty of Paying Living Expenses: Somewhat hard   Food Insecurity: Food Insecurity Present (9/3/2024)    Hunger Vital Sign     Worried About Running Out of Food in the Last Year: Sometimes true     Ran Out of Food in the Last Year: Never true   Physical Activity: Insufficiently Active (9/3/2024)    Exercise Vital Sign     Days of Exercise per Week: 1 day     Minutes of Exercise per Session: 20 min   Stress: No Stress Concern Present (9/3/2024)    [employer] [place] [employer] - Occupational Stress Questionnaire     Feeling of Stress : Not at all   Housing Stability: Unknown (9/3/2024)    Housing Stability Vital Sign     Unable to Pay for Housing in the Last Year: No     Family History   Problem Relation Name Age of Onset    Heart disease Father      Hyperlipidemia Father      Hyperlipidemia Mother      Kidney disease Mother      Cancer Brother          pancreas    Heart disease Brother      Breast cancer Neg Hx      Colon cancer Neg Hx      Ovarian cancer Neg Hx          Review of Systems  Objective:   BP (!) 145/79 (BP Location: Left arm, Patient Position: Sitting)   Pulse 99   Ht 5' 7" (1.702 m)   Wt 76.7 kg (169 lb)   SpO2 100%   BMI 26.47 kg/m²     Physical Exam   Vitals reviewed.  Constitutional: She is oriented to person, place, and time.   Eyes: Right eye exhibits no discharge. Left eye exhibits no discharge.   Pulmonary/Chest: Effort normal. No respiratory distress. She has no wheezes. Right breast exhibits no inverted nipple, no mass, no nipple discharge, no skin change and no tenderness. Left breast exhibits tenderness. Left breast exhibits no inverted nipple, no mass, no nipple discharge and no skin change.       Abdominal: Normal appearance.   Musculoskeletal: Normal range of " motion. Lymphadenopathy:      Cervical: No cervical adenopathy.     Neurological: She is alert and oriented to person, place, and time.   Skin: Skin is warm and dry. No rash noted. No erythema. No pallor.         Radiology review: Images personally reviewed by me in the clinic.     5/28/2024 Mammo Digital Screening Bilat w/ [facility]     History:  Patient is 64 y.o. and is seen for a screening mammogram.        Films Compared:  Compared to: 05/10/2023 Mammo Digital Diagnostic Bilat with [facility], 05/03/2022 Mammo Digital Screening Bilat w/ [facility], and 04/12/2021 Mammo Digital Screening Bilat w/ [facility]      Findings:  This procedure was performed using tomosynthesis.   Computer-aided detection was utilized in the interpretation of this examination.     There are scattered areas of fibroglandular density. There is no evidence of suspicious masses, microcalcifications or architectural distortion.     Impression:   No mammographic evidence of malignancy.     BI-RADS Category 1: Negative      Assessment:       1. Breast pain    2. Encounter for screening breast examination        Plan:   We discussed there was nothing concerning on exam or imaging. Reassurance given. Most likely musculoskeletal origin.     Discussed OTC therapies such as acetaminophen or nonsteroidal anti-inflammatory drugs (NSAIDs). They can both be used to relieve breast pain. Topical NSAIDS such as OTC Diclofenac (Volteran) gel can be used. Other recommendations include use of a supportive bra. Wearing a soft, supportive bra at night prevents the breasts from pulling down on the chest wall. Some women obtain relief from application of warm compresses or ice packs. Also, encouraged professional bra fitting.     Discussed lifestyle recommendations such as decrease or elimination of caffeine. Also low-fat diet, high complex carbohydrate diet has been shown to be effective.      Return to clinic in 3 months or as needed for worsening pain not managed with therapies  discussed above.     The patient is in agreement with the plan. Questions were encouraged and answered to patient's satisfaction. Linh will call our office with any questions or concerns.

## 2024-11-18 ENCOUNTER — TELEPHONE (OUTPATIENT)
Dept: CARDIOLOGY | Facility: CLINIC | Age: 64
End: 2024-11-18
Payer: MEDICARE

## 2024-11-18 NOTE — TELEPHONE ENCOUNTER
----- Message from Anjum sent at 11/18/2024 12:18 PM CST -----  Regarding: self  Type: Patient Call Back    Who called:self    What is the request in detail:pt is calling to speak to this regarding her chronic condition form, stated this office was needing to call the insurance in regards of it. Would like a callback     Can the clinic reply by [facility]?no    Would the patient rather a call back or a response via [facility]? callback    Best call back number:[phone]    Additional Information:human number [phone]

## 2024-11-20 DIAGNOSIS — K21.9 GASTROESOPHAGEAL REFLUX DISEASE: ICD-10-CM

## 2024-11-20 RX ORDER — OMEPRAZOLE 20 MG/1
20 CAPSULE, DELAYED RELEASE ORAL
Qty: 90 CAPSULE | Refills: 3 | Status: SHIPPED | OUTPATIENT
Start: 2024-11-20

## 2024-11-20 NOTE — TELEPHONE ENCOUNTER
Refill Decision Note   Linh Clifton  is requesting a refill authorization.  Brief Assessment and Rationale for Refill:  Approve     Medication Therapy Plan:         Comments:     Note composed:11:17 AM 11/20/2024

## 2024-11-20 NOTE — TELEPHONE ENCOUNTER
No care due was identified.  [facility] Embedded Care Due Messages. Reference number: [id number].   11/20/2024 7:01:53 AM CST

## 2024-12-03 ENCOUNTER — TELEPHONE (OUTPATIENT)
Dept: FAMILY MEDICINE | Facility: CLINIC | Age: 64
End: 2024-12-03
Payer: MEDICARE

## 2024-12-03 ENCOUNTER — HOSPITAL ENCOUNTER (OUTPATIENT)
Facility: HOSPITAL | Age: 64
Discharge: HOME | End: 2024-12-04
Attending: EMERGENCY MEDICINE
Payer: MEDICARE

## 2024-12-03 ENCOUNTER — PATIENT OUTREACH (OUTPATIENT)
Dept: ADMINISTRATIVE | Facility: OTHER | Age: 64
End: 2024-12-03
Payer: MEDICARE

## 2024-12-03 DIAGNOSIS — R07.9 ACUTE CHEST PAIN: ICD-10-CM

## 2024-12-03 DIAGNOSIS — R07.9 CHEST PAIN WITH LOW RISK FOR CARDIAC ETIOLOGY: Primary | ICD-10-CM

## 2024-12-03 DIAGNOSIS — D50.8 IRON DEFICIENCY ANEMIA SECONDARY TO INADEQUATE DIETARY IRON INTAKE: ICD-10-CM

## 2024-12-03 DIAGNOSIS — R07.9 CHEST PAIN: ICD-10-CM

## 2024-12-03 DIAGNOSIS — I25.10 CORONARY ARTERY DISEASE INVOLVING NATIVE CORONARY ARTERY OF NATIVE HEART WITHOUT ANGINA PECTORIS: ICD-10-CM

## 2024-12-03 DIAGNOSIS — I25.10 CORONARY ARTERY DISEASE INVOLVING NATIVE CORONARY ARTERY OF NATIVE HEART WITHOUT ANGINA PECTORIS: Primary | ICD-10-CM

## 2024-12-03 PROBLEM — E61.1 IRON DEFICIENCY: Status: RESOLVED | Noted: 2024-10-29 | Resolved: 2024-12-03

## 2024-12-03 PROBLEM — D50.9 IRON DEFICIENCY ANEMIA: Status: ACTIVE | Noted: 2024-12-03

## 2024-12-03 LAB
ALBUMIN SERPL BCP-MCNC: 3.3 G/DL (ref 3.5–5.2)
ALP SERPL-CCNC: 138 U/L (ref 40–150)
ALT SERPL W/O P-5'-P-CCNC: 19 U/L (ref 10–44)
ANION GAP SERPL CALC-SCNC: 6 MMOL/L (ref 8–16)
AORTIC ROOT ANNULUS: 2.98 CM
AORTIC VALVE CUSP SEPERATION: 1.94 CM
ASCENDING AORTA: 2.55 CM
AST SERPL-CCNC: 20 U/L (ref 10–40)
AV INDEX (PROSTH): 0.68
AV MEAN GRADIENT: 3.5 MMHG
AV PEAK GRADIENT: 5.8 MMHG
AV VALVE AREA BY VELOCITY RATIO: 2.4 CM²
AV VALVE AREA: 2.1 CM²
AV VELOCITY RATIO: 0.75
BASOPHILS # BLD AUTO: 0.02 K/UL (ref 0–0.2)
BASOPHILS NFR BLD: 0.4 % (ref 0–1.9)
BILIRUB SERPL-MCNC: 0.3 MG/DL (ref 0.1–1)
BNP SERPL-MCNC: 21 PG/ML (ref 0–99)
BSA FOR ECHO PROCEDURE: 1.91 M2
BUN SERPL-MCNC: 16 MG/DL (ref 8–23)
CALCIUM SERPL-MCNC: 9.3 MG/DL (ref 8.7–10.5)
CHLORIDE SERPL-SCNC: 105 MMOL/L (ref 95–110)
CO2 SERPL-SCNC: 29 MMOL/L (ref 23–29)
CREAT SERPL-MCNC: 0.9 MG/DL (ref 0.5–1.4)
CV ECHO LV RWT: 0.53 CM
DIFFERENTIAL METHOD BLD: ABNORMAL
DOP CALC AO PEAK VEL: 1.2 M/S
DOP CALC AO VTI: 30.3 CM
DOP CALC LVOT AREA: 3.1 CM2
DOP CALC LVOT DIAMETER: 2 CM
DOP CALC LVOT PEAK VEL: 0.9 M/S
DOP CALC LVOT STROKE VOLUME: 65 CM3
DOP CALCLVOT PEAK VEL VTI: 20.7 CM
E WAVE DECELERATION TIME: 168.33 MSEC
E/A RATIO: 0.97
E/E' RATIO: 11.18 M/S
ECHO LV POSTERIOR WALL: 1 CM (ref 0.6–1.1)
EOSINOPHIL # BLD AUTO: 0.1 K/UL (ref 0–0.5)
EOSINOPHIL NFR BLD: 2.7 % (ref 0–8)
ERYTHROCYTE [DISTWIDTH] IN BLOOD BY AUTOMATED COUNT: 17.7 % (ref 11.5–14.5)
EST. GFR  (NO RACE VARIABLE): >60 ML/MIN/1.73 M^2
FRACTIONAL SHORTENING: 36.8 % (ref 28–44)
GLUCOSE SERPL-MCNC: 92 MG/DL (ref 70–110)
HCT VFR BLD AUTO: 31.8 % (ref 37–48.5)
HGB BLD-MCNC: 9.7 G/DL (ref 12–16)
IMM GRANULOCYTES # BLD AUTO: 0.02 K/UL (ref 0–0.04)
IMM GRANULOCYTES NFR BLD AUTO: 0.4 % (ref 0–0.5)
INR PPP: 0.9 (ref 0.8–1.2)
INTERVENTRICULAR SEPTUM: 1 CM (ref 0.6–1.1)
IVC DIAMETER: 1.73 CM
IVRT: 121.79 MSEC
LA MAJOR: 4.76 CM
LA MINOR: 4.35 CM
LA WIDTH: 3.6 CM
LEFT ATRIUM SIZE: 3.56 CM
LEFT ATRIUM VOLUME INDEX: 26.2 ML/M2
LEFT ATRIUM VOLUME: 49.52 CM3
LEFT INTERNAL DIMENSION IN SYSTOLE: 2.4 CM (ref 2.1–4)
LEFT VENTRICLE DIASTOLIC VOLUME INDEX: 32.61 ML/M2
LEFT VENTRICLE DIASTOLIC VOLUME: 61.63 ML
LEFT VENTRICLE MASS INDEX: 62.1 G/M2
LEFT VENTRICLE SYSTOLIC VOLUME INDEX: 10.9 ML/M2
LEFT VENTRICLE SYSTOLIC VOLUME: 20.54 ML
LEFT VENTRICULAR INTERNAL DIMENSION IN DIASTOLE: 3.8 CM (ref 3.5–6)
LEFT VENTRICULAR MASS: 117.3 G
LV LATERAL E/E' RATIO: 10.56 M/S
LV SEPTAL E/E' RATIO: 11.88 M/S
LVED V (TEICH): 61.63 ML
LVES V (TEICH): 20.54 ML
LVOT MG: 1.57 MMHG
LVOT MV: 0.6 CM/S
LYMPHOCYTES # BLD AUTO: 1.8 K/UL (ref 1–4.8)
LYMPHOCYTES NFR BLD: 35.4 % (ref 18–48)
MCH RBC QN AUTO: 19.7 PG (ref 27–31)
MCHC RBC AUTO-ENTMCNC: 30.5 G/DL (ref 32–36)
MCV RBC AUTO: 65 FL (ref 82–98)
MONOCYTES # BLD AUTO: 0.5 K/UL (ref 0.3–1)
MONOCYTES NFR BLD: 10.5 % (ref 4–15)
MV PEAK A VEL: 0.98 M/S
MV PEAK E VEL: 0.95 M/S
MV STENOSIS PRESSURE HALF TIME: 48.82 MS
MV VALVE AREA P 1/2 METHOD: 4.51 CM2
NEUTROPHILS # BLD AUTO: 2.6 K/UL (ref 1.8–7.7)
NEUTROPHILS NFR BLD: 50.6 % (ref 38–73)
NRBC BLD-RTO: 0 /100 WBC
PISA TR MAX VEL: 2.25 M/S
PLATELET # BLD AUTO: 206 K/UL (ref 150–450)
PMV BLD AUTO: ABNORMAL FL (ref 9.2–12.9)
POTASSIUM SERPL-SCNC: 3.7 MMOL/L (ref 3.5–5.1)
PROT SERPL-MCNC: 7.3 G/DL (ref 6–8.4)
PROTHROMBIN TIME: 10.3 SEC (ref 9–12.5)
PULM VEIN S/D RATIO: 1.8
PV PEAK D VEL: 0.35 M/S
PV PEAK GRADIENT: 4 MMHG
PV PEAK S VEL: 0.63 M/S
PV PEAK VELOCITY: 0.94 M/S
RA MAJOR: 4.24 CM
RA PRESSURE ESTIMATED: 3 MMHG
RA WIDTH: 3.1 CM
RBC # BLD AUTO: 4.93 M/UL (ref 4–5.4)
RIGHT VENTRICULAR END-DIASTOLIC DIMENSION: 3.24 CM
RV TB RVSP: 5 MMHG
RV TISSUE DOPPLER FREE WALL SYSTOLIC VELOCITY 1 (APICAL 4 CHAMBER VIEW): 11.8 CM/S
SINUS: 3 CM
SODIUM SERPL-SCNC: 140 MMOL/L (ref 136–145)
STJ: 2.29 CM
TDI LATERAL: 0.09 M/S
TDI SEPTAL: 0.08 M/S
TDI: 0.09 M/S
TR MAX PG: 20 MMHG
TRICUSPID ANNULAR PLANE SYSTOLIC EXCURSION: 1.81 CM
TROPONIN I SERPL DL<=0.01 NG/ML-MCNC: 0.01 NG/ML (ref 0–0.03)
TV REST PULMONARY ARTERY PRESSURE: 23 MMHG
WBC # BLD AUTO: 5.12 K/UL (ref 3.9–12.7)
Z-SCORE OF LEFT VENTRICULAR DIMENSION IN END DIASTOLE: -3.31
Z-SCORE OF LEFT VENTRICULAR DIMENSION IN END SYSTOLE: -2.42

## 2024-12-03 PROCEDURE — G0378 HOSPITAL OBSERVATION PER HR: HCPCS | Mod: HCNC

## 2024-12-03 PROCEDURE — 83880 ASSAY OF NATRIURETIC PEPTIDE: CPT | Mod: HCNC | Performed by: EMERGENCY MEDICINE

## 2024-12-03 PROCEDURE — 25000003 PHARM REV CODE 250: Mod: HCNC

## 2024-12-03 PROCEDURE — 63600175 PHARM REV CODE 636 W HCPCS: Mod: HCNC | Performed by: EMERGENCY MEDICINE

## 2024-12-03 PROCEDURE — 25000003 PHARM REV CODE 250: Mod: HCNC | Performed by: EMERGENCY MEDICINE

## 2024-12-03 PROCEDURE — 80053 COMPREHEN METABOLIC PANEL: CPT | Mod: HCNC | Performed by: EMERGENCY MEDICINE

## 2024-12-03 PROCEDURE — 84484 ASSAY OF TROPONIN QUANT: CPT | Mod: HCNC | Performed by: EMERGENCY MEDICINE

## 2024-12-03 PROCEDURE — 85610 PROTHROMBIN TIME: CPT | Mod: HCNC | Performed by: EMERGENCY MEDICINE

## 2024-12-03 PROCEDURE — 63600175 PHARM REV CODE 636 W HCPCS: Mod: HCNC

## 2024-12-03 PROCEDURE — 93005 ELECTROCARDIOGRAM TRACING: CPT | Mod: HCNC

## 2024-12-03 PROCEDURE — 85025 COMPLETE CBC W/AUTO DIFF WBC: CPT | Mod: HCNC | Performed by: EMERGENCY MEDICINE

## 2024-12-03 RX ORDER — FLUOCINONIDE TOPICAL SOLUTION USP, 0.05% 0.5 MG/ML
SOLUTION TOPICAL
COMMUNITY
Start: 2024-11-19

## 2024-12-03 RX ORDER — GUAIFENESIN 100 MG/5ML
200 SOLUTION ORAL EVERY 4 HOURS PRN
Status: DISCONTINUED | OUTPATIENT
Start: 2024-12-03 | End: 2024-12-04 | Stop reason: HOSPADM

## 2024-12-03 RX ORDER — TALC
6 POWDER (GRAM) TOPICAL NIGHTLY PRN
Status: DISCONTINUED | OUTPATIENT
Start: 2024-12-03 | End: 2024-12-04 | Stop reason: HOSPADM

## 2024-12-03 RX ORDER — KETOCONAZOLE 20 MG/ML
SHAMPOO, SUSPENSION TOPICAL
COMMUNITY
Start: 2024-12-02

## 2024-12-03 RX ORDER — NALOXONE HCL 0.4 MG/ML
0.02 VIAL (ML) INJECTION
Status: DISCONTINUED | OUTPATIENT
Start: 2024-12-03 | End: 2024-12-04 | Stop reason: HOSPADM

## 2024-12-03 RX ORDER — FINASTERIDE 5 MG/1
5 TABLET, FILM COATED ORAL DAILY
Status: DISCONTINUED | OUTPATIENT
Start: 2024-12-03 | End: 2024-12-04 | Stop reason: HOSPADM

## 2024-12-03 RX ORDER — ACETAMINOPHEN 325 MG/1
325 TABLET ORAL EVERY 6 HOURS PRN
Status: DISCONTINUED | OUTPATIENT
Start: 2024-12-03 | End: 2024-12-04 | Stop reason: HOSPADM

## 2024-12-03 RX ORDER — MORPHINE SULFATE 4 MG/ML
2 INJECTION, SOLUTION INTRAMUSCULAR; INTRAVENOUS
Status: ACTIVE | OUTPATIENT
Start: 2024-12-03 | End: 2024-12-04

## 2024-12-03 RX ORDER — BENZONATATE 100 MG/1
100 CAPSULE ORAL 3 TIMES DAILY PRN
Status: DISCONTINUED | OUTPATIENT
Start: 2024-12-03 | End: 2024-12-04 | Stop reason: HOSPADM

## 2024-12-03 RX ORDER — METOPROLOL SUCCINATE 25 MG/1
25 TABLET, EXTENDED RELEASE ORAL DAILY
Status: DISCONTINUED | OUTPATIENT
Start: 2024-12-03 | End: 2024-12-04 | Stop reason: HOSPADM

## 2024-12-03 RX ORDER — IBUPROFEN 200 MG
16 TABLET ORAL
Status: DISCONTINUED | OUTPATIENT
Start: 2024-12-03 | End: 2024-12-04 | Stop reason: HOSPADM

## 2024-12-03 RX ORDER — PANTOPRAZOLE SODIUM 40 MG/1
40 TABLET, DELAYED RELEASE ORAL DAILY
Status: DISCONTINUED | OUTPATIENT
Start: 2024-12-04 | End: 2024-12-04 | Stop reason: HOSPADM

## 2024-12-03 RX ORDER — IBUPROFEN 200 MG
24 TABLET ORAL
Status: DISCONTINUED | OUTPATIENT
Start: 2024-12-03 | End: 2024-12-04 | Stop reason: HOSPADM

## 2024-12-03 RX ORDER — HYDROCHLOROTHIAZIDE 25 MG/1
25 TABLET ORAL DAILY
Status: DISCONTINUED | OUTPATIENT
Start: 2024-12-04 | End: 2024-12-04 | Stop reason: HOSPADM

## 2024-12-03 RX ORDER — FINASTERIDE 5 MG/1
5 TABLET, FILM COATED ORAL
COMMUNITY
Start: 2024-11-19

## 2024-12-03 RX ORDER — KETOROLAC TROMETHAMINE 30 MG/ML
15 INJECTION, SOLUTION INTRAMUSCULAR; INTRAVENOUS
Status: COMPLETED | OUTPATIENT
Start: 2024-12-03 | End: 2024-12-03

## 2024-12-03 RX ORDER — GLUCAGON 1 MG
1 KIT INJECTION
Status: DISCONTINUED | OUTPATIENT
Start: 2024-12-03 | End: 2024-12-04 | Stop reason: HOSPADM

## 2024-12-03 RX ORDER — ONDANSETRON 4 MG/1
4 TABLET, ORALLY DISINTEGRATING ORAL EVERY 6 HOURS PRN
Status: DISCONTINUED | OUTPATIENT
Start: 2024-12-03 | End: 2024-12-04 | Stop reason: HOSPADM

## 2024-12-03 RX ORDER — SIMETHICONE 80 MG
1 TABLET,CHEWABLE ORAL 3 TIMES DAILY PRN
Status: DISCONTINUED | OUTPATIENT
Start: 2024-12-03 | End: 2024-12-04 | Stop reason: HOSPADM

## 2024-12-03 RX ORDER — CALCIUM CARBONATE 200(500)MG
500 TABLET,CHEWABLE ORAL 3 TIMES DAILY PRN
Status: DISCONTINUED | OUTPATIENT
Start: 2024-12-03 | End: 2024-12-04 | Stop reason: HOSPADM

## 2024-12-03 RX ORDER — PANTOPRAZOLE SODIUM 40 MG/10ML
80 INJECTION, POWDER, LYOPHILIZED, FOR SOLUTION INTRAVENOUS
Status: COMPLETED | OUTPATIENT
Start: 2024-12-03 | End: 2024-12-03

## 2024-12-03 RX ORDER — HYDRALAZINE HYDROCHLORIDE 20 MG/ML
10 INJECTION INTRAMUSCULAR; INTRAVENOUS EVERY 6 HOURS PRN
Status: DISCONTINUED | OUTPATIENT
Start: 2024-12-03 | End: 2024-12-04 | Stop reason: HOSPADM

## 2024-12-03 RX ORDER — ONDANSETRON 4 MG/1
4 TABLET, ORALLY DISINTEGRATING ORAL
Status: COMPLETED | OUTPATIENT
Start: 2024-12-03 | End: 2024-12-03

## 2024-12-03 RX ORDER — MORPHINE SULFATE 4 MG/ML
3 INJECTION, SOLUTION INTRAMUSCULAR; INTRAVENOUS
Status: COMPLETED | OUTPATIENT
Start: 2024-12-03 | End: 2024-12-03

## 2024-12-03 RX ORDER — ALUMINUM HYDROXIDE, MAGNESIUM HYDROXIDE, AND SIMETHICONE 1200; 120; 1200 MG/30ML; MG/30ML; MG/30ML
60 SUSPENSION ORAL
Status: COMPLETED | OUTPATIENT
Start: 2024-12-03 | End: 2024-12-03

## 2024-12-03 RX ORDER — NAPROXEN SODIUM 220 MG/1
81 TABLET, FILM COATED ORAL DAILY
Status: DISCONTINUED | OUTPATIENT
Start: 2024-12-04 | End: 2024-12-04 | Stop reason: HOSPADM

## 2024-12-03 RX ORDER — SODIUM CHLORIDE 0.9 % (FLUSH) 0.9 %
10 SYRINGE (ML) INJECTION EVERY 12 HOURS PRN
Status: DISCONTINUED | OUTPATIENT
Start: 2024-12-03 | End: 2024-12-04 | Stop reason: HOSPADM

## 2024-12-03 RX ORDER — ATORVASTATIN CALCIUM 10 MG/1
20 TABLET, FILM COATED ORAL NIGHTLY
Status: DISCONTINUED | OUTPATIENT
Start: 2024-12-04 | End: 2024-12-04 | Stop reason: HOSPADM

## 2024-12-03 RX ORDER — DIPHENHYDRAMINE HCL 25 MG
25 CAPSULE ORAL EVERY 6 HOURS PRN
Status: DISCONTINUED | OUTPATIENT
Start: 2024-12-03 | End: 2024-12-04 | Stop reason: HOSPADM

## 2024-12-03 RX ORDER — POLYETHYLENE GLYCOL 3350 17 G/17G
17 POWDER, FOR SOLUTION ORAL DAILY PRN
Status: DISCONTINUED | OUTPATIENT
Start: 2024-12-03 | End: 2024-12-04 | Stop reason: HOSPADM

## 2024-12-03 RX ADMIN — FINASTERIDE 5 MG: 5 TABLET, FILM COATED ORAL at 03:12

## 2024-12-03 RX ADMIN — DIPHENHYDRAMINE HYDROCHLORIDE 25 MG: 25 CAPSULE ORAL at 09:12

## 2024-12-03 RX ADMIN — SODIUM CHLORIDE 125 MG: 9 INJECTION, SOLUTION INTRAVENOUS at 04:12

## 2024-12-03 RX ADMIN — ONDANSETRON 4 MG: 4 TABLET, ORALLY DISINTEGRATING ORAL at 10:12

## 2024-12-03 RX ADMIN — ALUMINUM HYDROXIDE, MAGNESIUM HYDROXIDE, AND SIMETHICONE 60 ML: 1200; 120; 1200 SUSPENSION ORAL at 10:12

## 2024-12-03 RX ADMIN — MORPHINE SULFATE 3 MG: 4 INJECTION INTRAVENOUS at 10:12

## 2024-12-03 RX ADMIN — KETOROLAC TROMETHAMINE 15 MG: 30 INJECTION, SOLUTION INTRAMUSCULAR; INTRAVENOUS at 10:12

## 2024-12-03 RX ADMIN — METOPROLOL SUCCINATE 25 MG: 25 TABLET, EXTENDED RELEASE ORAL at 03:12

## 2024-12-03 RX ADMIN — PANTOPRAZOLE SODIUM 80 MG: 40 INJECTION, POWDER, LYOPHILIZED, FOR SOLUTION INTRAVENOUS at 10:12

## 2024-12-03 NOTE — H&P
[facility] Emergency Dept  [facility] Medicine  History & Physical    Patient Name: Linh Clifton  MRN: [id number]  Patient Class: OP- Observation  Admission Date: 12/3/2024  Attending Physician: Amarjit Kumar MD   Primary Care Provider: Feliciano Chandler MD         Patient information was obtained from patient, relative(s), and ER records.     Subjective:     Principal Problem:<principal problem not specified>    Chief Complaint:   Chief Complaint   Patient presents with    Chest Pain     Pt c/o chest discomfort x5 days. Pt stated she took gas medication and had burping and flatulence, but then pain returned. Pt stated pain in midsternum. Pt denied burning, but stated she felt nauseated.        HPI: 64 y.o. F with CAD, HTN, HLD, GERD, and anemia presented to the ED complaining of 5 days of intermittent, substernal chest pressure. She endorses associated nausea, lightheadedness and shortness of breath. Patient took Phazyme 500mg as she believed symptoms were related to GERD. Reports mild improvement temporarily, however, symptoms soon returned and increased in severity. Does not endorse increased pressure or pain with exertion. Patient states she has experienced similar pain years ago. Underwent a cardiac angiogram with Dr. Tirado which showed a small blockage which did not require stent placement. Has not experienced the pain since until now. She denies fever, chills, URI symptoms, lower extremity edema, or orthopnea.     In ED: Hypertensive, however, vitals stable. CBC showed mild anemia without leukocytosis. CMP unremarkable. BNP and initial troponin wnl. CXR showed chronic appearing interstitial findings, no large focal consolidation. Given Zofran, Protonix, GI cocktail, Toradol and Morphine for symptomatic relief. Admitted to Hospital Medicine for further evaluation of chest pain.     Past Medical History:   Diagnosis Date    Anemia 4/16/2015    Coronary artery disease     GERD (gastroesophageal reflux  disease)     Hyperlipidemia     Hypertension     Microcytic anemia 2015    Positive PPD 2015    Vitamin D deficiency disease 2015       Past Surgical History:   Procedure Laterality Date    BREAST BIOPSY      BREAST CYST EXCISION      CARDIAC CATHETERIZATION      prior to     CATARACT EXTRACTION, BILATERAL Bilateral 2019    COLONOSCOPY N/A 06/15/2018    Procedure: COLONOSCOPY;  Surgeon: Mikael Wilde MD;  Location: [facility] ENDO;  Service: Endoscopy;  Laterality: N/A;  confirmed-ss    COLONOSCOPY N/A 11/10/2023    Procedure: COLONOSCOPY;  Surgeon: Felicia Daniels MD;  Location: [facility] ENDO ([facility] FLR);  Service: Endoscopy;  Laterality: N/A;  -amb referral on chart, last colon , history of colon polyps, instr to e-mail, Suprep not covered, changed to PEG prep   r/s; updated instructions mailed-st  moved pt from [facility] to [facility]---  11/3-lvm for precall-MS  -precall complete-Kpvt    HYSTERECTOMY      PARTIAL HYSTERECTOMY      over 21 years ago       Review of patient's allergies indicates:  No Known Allergies    No current facility-administered medications on file prior to encounter.     Current Outpatient Medications on File Prior to Encounter   Medication Sig    aspirin 81 MG Chew Take 81 mg by mouth once daily.    atorvastatin (LIPITOR) 20 MG tablet Take 1 tablet (20 mg total) by mouth every evening.    ergocalciferol (ERGOCALCIFEROL) 50,000 unit Cap Take 1 capsule (50,000 Units total) by mouth every 7 days.    finasteride (PROSCAR) 5 mg tablet Take 5 mg by mouth.    hydroCHLOROthiazide (HYDRODIURIL) 25 MG tablet Take 1 tablet by mouth once daily    metoprolol succinate (TOPROL-XL) 25 MG 24 hr tablet Take 1 tablet by mouth once daily    omeprazole (PRILOSEC) 20 MG capsule Take 1 capsule by mouth once daily    cetirizine (ZYRTEC) 10 MG tablet Take 10 mg by mouth once daily. PRN    fluocinonide (LIDEX) 0.05 % external solution SMARTSI Milliliter(s) Topical Every Night    ketoconazole  (NIZORAL) 2 % shampoo Apply topically.    [DISCONTINUED] albuterol (VENTOLIN HFA) 90 mcg/actuation inhaler Inhale 2 puffs into the lungs every 6 (six) hours as needed for Wheezing. Rescue    [DISCONTINUED] fluticasone propionate (FLONASE) 50 mcg/actuation nasal spray 2 sprays (100 mcg total) by Each Nostril route once daily.    [DISCONTINUED] methocarbamoL (ROBAXIN) 500 MG Tab Take 1 tablet (500 mg total) by mouth every 12 (twelve) hours as needed (back spasms).    [DISCONTINUED] naproxen (NAPROSYN) 500 MG tablet Take 1 tablet (500 mg total) by mouth 2 (two) times daily with meals.    [DISCONTINUED] sod bicarb-sod chlor-neti pot (SINUS WASH NETI POT) pkdv Use three times daily as needed for sinus congestion     Family History       Problem Relation (Age of Onset)    Cancer Brother    Heart disease Father, Brother    Hyperlipidemia Father, Mother    Kidney disease Mother          Tobacco Use    Smoking status: Never     Passive exposure: Never    Smokeless tobacco: Never   Substance and Sexual Activity    Alcohol use: Not Currently     Comment: occasional drinking    Drug use: No    Sexual activity: Yes     Partners: Male     Review of Systems   Constitutional:  Negative for activity change, appetite change, chills, diaphoresis and fever.   Respiratory:  Negative for cough, shortness of breath and wheezing.    Cardiovascular:  Positive for chest pain (substernal). Negative for palpitations and leg swelling.   Gastrointestinal:  Positive for nausea. Negative for abdominal pain, diarrhea and vomiting.   Neurological:  Positive for light-headedness. Negative for dizziness, syncope, speech difficulty, weakness and numbness.     Objective:     Vital Signs (Most Recent):  Temp: 98 °F (36.7 °C) (12/03/24 1443)  Pulse: 72 (12/03/24 1443)  Resp: 15 (12/03/24 1443)  BP: (!) 177/77 (12/03/24 1443)  SpO2: 98 % (12/03/24 1443) Vital Signs (24h Range):  Temp:  [98 °F (36.7 °C)-98.8 °F (37.1 °C)] 98 °F (36.7 °C)  Pulse:  [61-82]  72  Resp:  [12-23] 15  SpO2:  [96 %-100 %] 98 %  BP: (157-194)/(72-85) 177/77     Weight: 77.1 kg (170 lb)  Body mass index is 26.63 kg/m².     Physical Exam  Vitals and nursing note reviewed.   Constitutional:       Appearance: Normal appearance.   HENT:      Head: Normocephalic.      Mouth/Throat:      Mouth: Mucous membranes are moist.      Pharynx: Oropharynx is clear.   Eyes:      Conjunctiva/sclera: Conjunctivae normal.   Cardiovascular:      Rate and Rhythm: Normal rate and regular rhythm.      Pulses: Normal pulses.      Heart sounds: Normal heart sounds.   Pulmonary:      Effort: Pulmonary effort is normal. No respiratory distress.      Breath sounds: Normal breath sounds. No wheezing or rales.   Abdominal:      General: Bowel sounds are normal. There is no distension.      Palpations: Abdomen is soft.      Tenderness: There is no abdominal tenderness. There is no guarding.   Musculoskeletal:      Right lower leg: No edema.      Left lower leg: No edema.   Skin:     General: Skin is warm and dry.   Neurological:      General: No focal deficit present.      Mental Status: She is alert and oriented to person, place, and time.   Psychiatric:         Mood and Affect: Mood normal.         Behavior: Behavior normal.                Significant Labs: All pertinent labs within the past 24 hours have been reviewed.    Significant Imaging: I have reviewed all pertinent imaging results/findings within the past 24 hours.  Assessment/Plan:     Iron deficiency anemia  Anemia is likely due to Iron deficiency. Most recent hemoglobin and hematocrit are listed below.  Recent Labs     12/03/24  1053   HGB 9.7*   HCT 31.8*     Plan  - Monitor serial CBC: Daily  - Transfuse PRBC if patient becomes hemodynamically unstable, symptomatic or H/H drops below 7/21.  - Patient has not received any PRBC transfusions to date  - Patient's anemia is currently stable  - Recent labwork shows Ferritin of 7 last month. Patient does not report  taking iron supplement. Will administer IV iron infusion while inpatient.     CAD (coronary artery disease)  Patient with known CAD s/p  no intervention , which is controlled Will continue ASA and Statin and monitor for S/Sx of angina/ACS. Continue to monitor on telemetry.     Hyperlipidemia  - Continue home statin.      Hypertension  Patient's blood pressure range in the last 24 hours was: BP  Min: 157/74  Max: 194/85.The patient's inpatient anti-hypertensive regimen is listed below:  Current Antihypertensives  metoprolol succinate (TOPROL-XL) 24 hr tablet 25 mg, Daily, Oral  hydroCHLOROthiazide tablet 25 mg, Daily, Oral    Plan  - BP is uncontrolled, will adjust as follows: Hydralazine PRN for SBP >180    Chest pain  64 y.o. F with CAD, HTN, HLD, GERD, and anemia presented to the ED complaining of 5 days of intermittent, substernal chest pressure. Initial troponin 0.0. EKG NSR without evidence of ischemia. BNP wnl.     - Trend troponins.   - Continuous telemetry.  - Plan for Stress test 12/4. NPO @ midnight.     Results for orders placed during the hospital encounter of 12/03/24    Echo    Interpretation Summary    Left Ventricle: The left ventricle is normal in size. There is normal systolic function with a visually estimated ejection fraction of 60 - 65%.    Right Ventricle: Normal right ventricular cavity size. Systolic function is normal.    Left Atrium: Left atrium is mildly dilated.    Tricuspid Valve: There is mild regurgitation.    Pulmonary Artery: The estimated pulmonary artery systolic pressure is 23 mmHg.    IVC/SVC: Normal venous pressure at 3 mmHg.       VTE Risk Mitigation (From admission, onward)           Ordered     IP VTE LOW RISK PATIENT  Once         12/03/24 1317     Place sequential compression device  Until discontinued         12/03/24 1317                         On 12/03/2024, patient should be placed in hospital observation services under my care in collaboration with Dr. Ocasio  José.           Zayda Beverly PA-C  Department of Hospital Medicine  [facility] - Emergency Dept

## 2024-12-03 NOTE — HPI
64 y.o. F with CAD, HTN, HLD, GERD, and anemia presented to the ED complaining of 5 days of intermittent, substernal chest pressure. She endorses associated nausea, lightheadedness and shortness of breath. Patient took Phazyme 500mg as she believed symptoms were related to GERD. Reports mild improvement temporarily, however, symptoms soon returned and increased in severity. Does not endorse increased pressure or pain with exertion. Patient states she has experienced similar pain years ago. Underwent a cardiac angiogram with Dr. Tirado which showed a small blockage which did not require stent placement. Has not experienced the pain since until now. She denies fever, chills, URI symptoms, lower extremity edema, or orthopnea.     In ED: Hypertensive, however, vitals stable. CBC showed mild anemia without leukocytosis. CMP unremarkable. BNP and initial troponin wnl. CXR showed chronic appearing interstitial findings, no large focal consolidation. Given Zofran, Protonix, GI cocktail, Toradol and Morphine for symptomatic relief. Admitted to Hospital Medicine for further evaluation of chest pain.

## 2024-12-03 NOTE — ASSESSMENT & PLAN NOTE
Patient with known CAD s/p  no intervention , which is controlled Will continue ASA and Statin and monitor for S/Sx of angina/ACS. Continue to monitor on telemetry.

## 2024-12-03 NOTE — Clinical Note
Diagnosis: Chest pain [739921]   Future Attending Provider: SAAD TADEO [[id number]]   Special Needs:: No Special Needs [1]

## 2024-12-03 NOTE — NURSING
Received pt from ED to room via stretcher. Pt transported by transport. Transferred pt to bed. Evaluated patient condition. Admit assessment initiated. Telemetry monitoring initiated. Saline lock to LFA flushed, intact. No c/o pain. NAD noted. Patient oriented to room with call light within reach and bed in lowest position.     [facility] [place]  Nurses Note -- 4 Eyes      12/3/2024       Skin assessed on: Admit      [x] No Pressure Injuries Present    []Prevention Measures Documented    [] Yes LDA  for Pressure Injury Previously documented     [] Yes New Pressure Injury Discovered   [] LDA for New Pressure Injury Added      Attending RN:  Ninfa Sears LPN     Second RN:  KAVYA Carson

## 2024-12-03 NOTE — ASSESSMENT & PLAN NOTE
Patient's blood pressure range in the last 24 hours was: BP  Min: 157/74  Max: 194/85.The patient's inpatient anti-hypertensive regimen is listed below:  Current Antihypertensives  metoprolol succinate (TOPROL-XL) 24 hr tablet 25 mg, Daily, Oral  hydroCHLOROthiazide tablet 25 mg, Daily, Oral    Monitor

## 2024-12-03 NOTE — SUBJECTIVE & OBJECTIVE
Past Medical History:   Diagnosis Date    Anemia 4/16/2015    Coronary artery disease     GERD (gastroesophageal reflux disease)     Hyperlipidemia     Hypertension     Microcytic anemia 4/16/2015    Positive PPD 4/16/2015    Vitamin D deficiency disease 4/16/2015       Past Surgical History:   Procedure Laterality Date    BREAST BIOPSY      BREAST CYST EXCISION      CARDIAC CATHETERIZATION      prior to 2005    CATARACT EXTRACTION, BILATERAL Bilateral 02/2019    COLONOSCOPY N/A 06/15/2018    Procedure: COLONOSCOPY;  Surgeon: Mikael Wilde MD;  Location: [facility] ENDO;  Service: Endoscopy;  Laterality: N/A;  confirmed-ss    COLONOSCOPY N/A 11/10/2023    Procedure: COLONOSCOPY;  Surgeon: Felicia Daniels MD;  Location: [facility] ENDO (4TH FLR);  Service: Endoscopy;  Laterality: N/A;  8/21-amb referral on chart, last colon 2018, history of colon polyps, instr to e-mail, Suprep not covered, changed to PEG prep  9/27 r/s; updated instructions mailed-st  moved pt from [facility] to [facility]---  11/3-lvm for precall-MS  11/8-precall complete-Kpvt    HYSTERECTOMY      PARTIAL HYSTERECTOMY      over 21 years ago       Review of patient's allergies indicates:  No Known Allergies    No current facility-administered medications on file prior to encounter.     Current Outpatient Medications on File Prior to Encounter   Medication Sig    aspirin 81 MG Chew Take 81 mg by mouth once daily.    atorvastatin (LIPITOR) 20 MG tablet Take 1 tablet (20 mg total) by mouth every evening.    ergocalciferol (ERGOCALCIFEROL) 50,000 unit Cap Take 1 capsule (50,000 Units total) by mouth every 7 days.    finasteride (PROSCAR) 5 mg tablet Take 5 mg by mouth.    hydroCHLOROthiazide (HYDRODIURIL) 25 MG tablet Take 1 tablet by mouth once daily    metoprolol succinate (TOPROL-XL) 25 MG 24 hr tablet Take 1 tablet by mouth once daily    omeprazole (PRILOSEC) 20 MG capsule Take 1 capsule by mouth once daily    cetirizine (ZYRTEC) 10 MG tablet Take 10 mg by mouth once daily.  PRN    fluocinonide (LIDEX) 0.05 % external solution SMARTSI Milliliter(s) Topical Every Night    ketoconazole (NIZORAL) 2 % shampoo Apply topically.    [DISCONTINUED] albuterol (VENTOLIN HFA) 90 mcg/actuation inhaler Inhale 2 puffs into the lungs every 6 (six) hours as needed for Wheezing. Rescue    [DISCONTINUED] fluticasone propionate (FLONASE) 50 mcg/actuation nasal spray 2 sprays (100 mcg total) by Each Nostril route once daily.    [DISCONTINUED] methocarbamoL (ROBAXIN) 500 MG Tab Take 1 tablet (500 mg total) by mouth every 12 (twelve) hours as needed (back spasms).    [DISCONTINUED] naproxen (NAPROSYN) 500 MG tablet Take 1 tablet (500 mg total) by mouth 2 (two) times daily with meals.    [DISCONTINUED] sod bicarb-sod chlor-neti pot (SINUS WASH NETI POT) pkdv Use three times daily as needed for sinus congestion     Family History       Problem Relation (Age of Onset)    Cancer Brother    Heart disease Father, Brother    Hyperlipidemia Father, Mother    Kidney disease Mother          Tobacco Use    Smoking status: Never     Passive exposure: Never    Smokeless tobacco: Never   Substance and Sexual Activity    Alcohol use: Not Currently     Comment: occasional drinking    Drug use: No    Sexual activity: Yes     Partners: Male     Review of Systems   Constitutional:  Negative for activity change, appetite change, chills, diaphoresis and fever.   Respiratory:  Negative for cough, shortness of breath and wheezing.    Cardiovascular:  Positive for chest pain (substernal). Negative for palpitations and leg swelling.   Gastrointestinal:  Positive for nausea. Negative for abdominal pain, diarrhea and vomiting.   Neurological:  Positive for light-headedness. Negative for dizziness, syncope, speech difficulty, weakness and numbness.     Objective:     Vital Signs (Most Recent):  Temp: 98 °F (36.7 °C) (24 1443)  Pulse: 72 (24 1443)  Resp: 15 (24 1443)  BP: (!) 177/77 (24 1443)  SpO2: 98 %  (12/03/24 1443) Vital Signs (24h Range):  Temp:  [98 °F (36.7 °C)-98.8 °F (37.1 °C)] 98 °F (36.7 °C)  Pulse:  [61-82] 72  Resp:  [12-23] 15  SpO2:  [96 %-100 %] 98 %  BP: (157-194)/(72-85) 177/77     Weight: 77.1 kg (170 lb)  Body mass index is 26.63 kg/m².     Physical Exam  Vitals and nursing note reviewed.   Constitutional:       Appearance: Normal appearance.   HENT:      Head: Normocephalic.      Mouth/Throat:      Mouth: Mucous membranes are moist.      Pharynx: Oropharynx is clear.   Eyes:      Conjunctiva/sclera: Conjunctivae normal.   Cardiovascular:      Rate and Rhythm: Normal rate and regular rhythm.      Pulses: Normal pulses.      Heart sounds: Normal heart sounds.   Pulmonary:      Effort: Pulmonary effort is normal. No respiratory distress.      Breath sounds: Normal breath sounds. No wheezing or rales.   Abdominal:      General: Bowel sounds are normal. There is no distension.      Palpations: Abdomen is soft.      Tenderness: There is no abdominal tenderness. There is no guarding.   Musculoskeletal:      Right lower leg: No edema.      Left lower leg: No edema.   Skin:     General: Skin is warm and dry.   Neurological:      General: No focal deficit present.      Mental Status: She is alert and oriented to person, place, and time.   Psychiatric:         Mood and Affect: Mood normal.         Behavior: Behavior normal.                Significant Labs: All pertinent labs within the past 24 hours have been reviewed.    Significant Imaging: I have reviewed all pertinent imaging results/findings within the past 24 hours.

## 2024-12-03 NOTE — TELEPHONE ENCOUNTER
----- Message from Patrizia sent at 12/3/2024  8:12 AM CST -----  Type:  Patient Requesting Referral     Who Called: Self    Referral to What Specialty: Cardiology    Reason for Referral: Heaviness on chest    Does the patient want the referral with a specific physician?:Yes. Dr. Bartolome Tirado      [facility]     Would the patient rather a call back or a response via [facility]? Call Back     Best Call Back Number: [phone] (home)       Additional Information: Ask that the nurse call once referral has been placed    Thank you

## 2024-12-03 NOTE — ASSESSMENT & PLAN NOTE
Anemia is likely due to Iron deficiency. Most recent hemoglobin and hematocrit are listed below.  Recent Labs     12/03/24  1053   HGB 9.7*   HCT 31.8*     Plan  - Monitor serial CBC: Daily  - Transfuse PRBC if patient becomes hemodynamically unstable, symptomatic or H/H drops below 7/21.  - Patient has not received any PRBC transfusions to date  - Patient's anemia is currently stable  - Recent labwork shows Ferritin of 7 last month. Patient does not report taking iron supplement. Will administer IV iron infusion while inpatient.

## 2024-12-03 NOTE — ASSESSMENT & PLAN NOTE
Mostly atypical. EKG normal. Troponin negative. EF normal on echo. Stress test in AM - ok for d/c if negative for ischemia

## 2024-12-03 NOTE — SUBJECTIVE & OBJECTIVE
Past Medical History:   Diagnosis Date    Anemia 4/16/2015    Coronary artery disease     GERD (gastroesophageal reflux disease)     Hyperlipidemia     Hypertension     Microcytic anemia 4/16/2015    Positive PPD 4/16/2015    Vitamin D deficiency disease 4/16/2015       Past Surgical History:   Procedure Laterality Date    BREAST BIOPSY      BREAST CYST EXCISION      CARDIAC CATHETERIZATION      prior to 2005    CATARACT EXTRACTION, BILATERAL Bilateral 02/2019    COLONOSCOPY N/A 06/15/2018    Procedure: COLONOSCOPY;  Surgeon: Mikael Wilde MD;  Location: [facility] ENDO;  Service: Endoscopy;  Laterality: N/A;  confirmed-ss    COLONOSCOPY N/A 11/10/2023    Procedure: COLONOSCOPY;  Surgeon: Felicia Daniels MD;  Location: [facility] ENDO (4TH FLR);  Service: Endoscopy;  Laterality: N/A;  8/21-amb referral on chart, last colon 2018, history of colon polyps, instr to e-mail, Suprep not covered, changed to PEG prep  9/27 r/s; updated instructions mailed-st  moved pt from [facility] to [facility]---  11/3-lvm for precall-MS  11/8-precall complete-Kpvt    HYSTERECTOMY      PARTIAL HYSTERECTOMY      over 21 years ago       Review of patient's allergies indicates:  No Known Allergies    No current facility-administered medications on file prior to encounter.     Current Outpatient Medications on File Prior to Encounter   Medication Sig    aspirin 81 MG Chew Take 81 mg by mouth once daily.    atorvastatin (LIPITOR) 20 MG tablet Take 1 tablet (20 mg total) by mouth every evening.    ergocalciferol (ERGOCALCIFEROL) 50,000 unit Cap Take 1 capsule (50,000 Units total) by mouth every 7 days.    finasteride (PROSCAR) 5 mg tablet Take 5 mg by mouth.    hydroCHLOROthiazide (HYDRODIURIL) 25 MG tablet Take 1 tablet by mouth once daily    metoprolol succinate (TOPROL-XL) 25 MG 24 hr tablet Take 1 tablet by mouth once daily    omeprazole (PRILOSEC) 20 MG capsule Take 1 capsule by mouth once daily    cetirizine (ZYRTEC) 10 MG tablet Take 10 mg by mouth once daily.  PRN    fluocinonide (LIDEX) 0.05 % external solution SMARTSI Milliliter(s) Topical Every Night    ketoconazole (NIZORAL) 2 % shampoo Apply topically.    [DISCONTINUED] albuterol (VENTOLIN HFA) 90 mcg/actuation inhaler Inhale 2 puffs into the lungs every 6 (six) hours as needed for Wheezing. Rescue    [DISCONTINUED] fluticasone propionate (FLONASE) 50 mcg/actuation nasal spray 2 sprays (100 mcg total) by Each Nostril route once daily.    [DISCONTINUED] methocarbamoL (ROBAXIN) 500 MG Tab Take 1 tablet (500 mg total) by mouth every 12 (twelve) hours as needed (back spasms).    [DISCONTINUED] naproxen (NAPROSYN) 500 MG tablet Take 1 tablet (500 mg total) by mouth 2 (two) times daily with meals.    [DISCONTINUED] sod bicarb-sod chlor-neti pot (SINUS WASH NETI POT) pkdv Use three times daily as needed for sinus congestion     Family History       Problem Relation (Age of Onset)    Cancer Brother    Heart disease Father, Brother    Hyperlipidemia Father, Mother    Kidney disease Mother          Tobacco Use    Smoking status: Never     Passive exposure: Never    Smokeless tobacco: Never   Substance and Sexual Activity    Alcohol use: Not Currently     Comment: occasional drinking    Drug use: No    Sexual activity: Yes     Partners: Male     Review of Systems   Constitutional: Negative for decreased appetite.   HENT:  Negative for ear discharge.    Eyes:  Negative for blurred vision.   Respiratory:  Negative for hemoptysis.    Endocrine: Negative for polyphagia.   Hematologic/Lymphatic: Negative for adenopathy.   Skin:  Negative for color change.   Musculoskeletal:  Negative for joint swelling.   Genitourinary:  Negative for bladder incontinence.   Neurological:  Negative for brief paralysis.   Psychiatric/Behavioral:  Negative for hallucinations.    Allergic/Immunologic: Negative for hives.     Objective:     Vital Signs (Most Recent):  Temp: 98.8 °F (37.1 °C) (24 0958)  Pulse: 63 (24 1300)  Resp: 20  (12/03/24 1300)  BP: (!) 157/74 (12/03/24 1300)  SpO2: 96 % (12/03/24 1300) Vital Signs (24h Range):  Temp:  [98.8 °F (37.1 °C)] 98.8 °F (37.1 °C)  Pulse:  [61-82] 63  Resp:  [12-23] 20  SpO2:  [96 %-100 %] 96 %  BP: (157-194)/(72-85) 157/74     Weight: 77.1 kg (170 lb)  Body mass index is 26.63 kg/m².    SpO2: 96 %       No intake or output data in the 24 hours ending 12/03/24 1432    Lines/Drains/Airways       Peripheral Intravenous Line  Duration                  Peripheral IV - Single Lumen 12/03/24 1025 20 G 1 in Anterior;Left Forearm <1 day                     Physical Exam  Constitutional:       Appearance: She is well-developed.   HENT:      Head: Normocephalic and atraumatic.   Eyes:      Conjunctiva/sclera: Conjunctivae normal.      Pupils: Pupils are equal, round, and reactive to light.   Cardiovascular:      Rate and Rhythm: Normal rate.      Pulses: Intact distal pulses.      Heart sounds: Normal heart sounds.   Pulmonary:      Effort: Pulmonary effort is normal.      Breath sounds: Normal breath sounds.   Abdominal:      General: Bowel sounds are normal.      Palpations: Abdomen is soft.   Musculoskeletal:         General: Normal range of motion.      Cervical back: Normal range of motion and neck supple.   Skin:     General: Skin is warm and dry.   Neurological:      Mental Status: She is alert and oriented to person, place, and time.          Significant Labs: All pertinent lab results from the last 24 hours have been reviewed.    Significant Imaging: Echocardiogram: 2D echo with color flow doppler: No results found for this or any previous visit.

## 2024-12-03 NOTE — PROGRESS NOTES
CHW - Initial Contact    This Community Health Worker updated and verified the Social Determinant of Health questionnaire with patient  at bedside  today.    Pt identified barriers of most importance are: has no needs at this time.   Referrals to community agencies completed with patient/caregiver consent outside of [facility] include: no: none at this time.  Referrals were put through [facility] - no: none at this time.  Support and Services: has no support at this time.  Other information discussed the patient needs / wants help with: Updated and verified SDOH with patient at bedside today, pt has no needs at this time. Will follow up in one week to check pt's future needs.    Follow up required: yes.  Follow-up Outreach - Due: 12/9/2024

## 2024-12-03 NOTE — ASSESSMENT & PLAN NOTE
Patient's blood pressure range in the last 24 hours was: BP  Min: 157/74  Max: 194/85.The patient's inpatient anti-hypertensive regimen is listed below:  Current Antihypertensives  metoprolol succinate (TOPROL-XL) 24 hr tablet 25 mg, Daily, Oral  hydroCHLOROthiazide tablet 25 mg, Daily, Oral    Plan  - BP is uncontrolled, will adjust as follows: Hydralazine PRN for SBP >180

## 2024-12-03 NOTE — PLAN OF CARE
Case Management Assessment     PCP: Feliciano Chandler  Pharmacy: [facility] [address]    Patient Arrived From: home   Existing Help at Home: spouse    Barriers to Discharge: none    Discharge Plan:    A. Home with family   B. Home with family    SW completed brief assessment and discussed discharge planning with patient at her bedside. Patient lives with her spouse who is her main support, her daughter Mara also serves as source of support. Patient's daughter will provide transportation for her to get home when discharge from the hospital.     12/03/24 4865   Discharge Planning   Assessment Type Discharge Planning Brief Assessment   Resource/Environmental Concerns none   Support Systems Spouse/significant other;Children   Equipment Currently Used at Home none   Current Living Arrangements home   Patient/Family Anticipates Transition to home with family   Patient/Family Anticipated Services at Transition none   DME Needed Upon Discharge  none   Discharge Plan A Home with family   Discharge Plan B Home with family

## 2024-12-03 NOTE — ASSESSMENT & PLAN NOTE
64 y.o. F with CAD, HTN, HLD, GERD, and anemia presented to the ED complaining of 5 days of intermittent, substernal chest pressure. Initial troponin 0.0. EKG NSR without evidence of ischemia. BNP wnl.     - Trend troponins.   - Continuous telemetry.  - Plan for Stress test 12/4. NPO @ midnight.     Results for orders placed during the hospital encounter of 12/03/24    Echo    Interpretation Summary    Left Ventricle: The left ventricle is normal in size. There is normal systolic function with a visually estimated ejection fraction of 60 - 65%.    Right Ventricle: Normal right ventricular cavity size. Systolic function is normal.    Left Atrium: Left atrium is mildly dilated.    Tricuspid Valve: There is mild regurgitation.    Pulmonary Artery: The estimated pulmonary artery systolic pressure is 23 mmHg.    IVC/SVC: Normal venous pressure at 3 mmHg.

## 2024-12-03 NOTE — HPI
"      Linh Clifton is a 64 y.o. female, with a PMHx of HTN, CAD, GERD, HLD, Vit D deficiency, anemia, who presents to the ED with complaint of intermitent midsternal chest discomfort with associated nausea and dyspnea that began 5 days ago. Reports pain feels like a "pressure" and denies pain feeling sharp. Reports exacerbation with deep inspiration. Patient reports pain worsened last night and was constant prompting ED visit. Patient attempted treatment with antacids and reports passing flatus after. However, Patient reports pain resumed. Patient reports cardiac history. States she recieved a angiogram over 20 years ago. Denies any recent long trips, car rides, or plane rides. No other exacerbating or alleviating factors. Denies Hx of DVTS or PE. Denies hx of cancer. Denies history of gastric ulcers or hematochezia. Patient denies cough, fever, chills, abdominal pain, vomiting, diarrhea, melena, hematochezia, dysuria, headaches, congestion, sore throat, arm or leg trouble, eye pain, ear pain, rash, or other associated symptoms.     Reports central chest tightness intermittently for several days  EKG NSR - ok  Troponin negative  BNP 21    Echo 12/3/24    Left Ventricle: The left ventricle is normal in size. There is normal systolic function with a visually estimated ejection fraction of 60 - 65%.    Right Ventricle: Normal right ventricular cavity size. Systolic function is normal.    Left Atrium: Left atrium is mildly dilated.    Tricuspid Valve: There is mild regurgitation.    Pulmonary Artery: The estimated pulmonary artery systolic pressure is 23 mmHg.    IVC/SVC: Normal venous pressure at 3 mmHg.    Last saw me 2021    Echo 11/3/21  The left ventricle is normal in size with concentric hypertrophy and normal systolic function.  The estimated ejection fraction is 65%.  Normal left ventricular diastolic function.  Normal right ventricular size with normal right ventricular systolic function.  Mild tricuspid " regurgitation.  Normal central venous pressure (3 mmHg).  The estimated PA systolic pressure is 30 mmHg.     Holter 11/3/21  Sinus rhythm with heart rates varying between 58 and 125 BPM with an average of 78 BPM.  There were very rare PACs totalling 3 and averaging 0.13 per hour.  The diary was not returned.           Stress echo 2/15/16  1 - Normal left ventricular systolic function (EF 60-65%). Normal resting wall motion.   2 - Trivial mitral regurgitation.   3 - Mild tricuspid regurgitation.   4 - The estimated PA systolic pressure is 29 mmHg.     No evidence of stress induced myocardial ischemia.      2/19/16 Still with intermittent palpitations  She thinks her CP may be from GERD     prn metoprolol 25 bid for palpitations  OV 3 months     10/20/21 here for recurrent palpitations and mild FLETCHER. Had 1 episode of CP but felt like gas  EKG NSR - ok  Echo and holter for FLETCHER and palpitations  Continue Rx for HTN, HLD      11/10/21 SOB and palpitations have improved. Still with intermittent gas like CP - improved after belching

## 2024-12-04 VITALS
SYSTOLIC BLOOD PRESSURE: 160 MMHG | BODY MASS INDEX: 26.68 KG/M2 | RESPIRATION RATE: 18 BRPM | DIASTOLIC BLOOD PRESSURE: 76 MMHG | HEIGHT: 67 IN | TEMPERATURE: 99 F | WEIGHT: 170 LBS | HEART RATE: 68 BPM | OXYGEN SATURATION: 100 %

## 2024-12-04 LAB
ALBUMIN SERPL BCP-MCNC: 2.9 G/DL (ref 3.5–5.2)
ALP SERPL-CCNC: 137 U/L (ref 40–150)
ALT SERPL W/O P-5'-P-CCNC: 16 U/L (ref 10–44)
ANION GAP SERPL CALC-SCNC: 7 MMOL/L (ref 8–16)
AST SERPL-CCNC: 19 U/L (ref 10–40)
BASOPHILS # BLD AUTO: 0.03 K/UL (ref 0–0.2)
BASOPHILS NFR BLD: 0.5 % (ref 0–1.9)
BILIRUB SERPL-MCNC: 0.2 MG/DL (ref 0.1–1)
BUN SERPL-MCNC: 26 MG/DL (ref 8–23)
CALCIUM SERPL-MCNC: 9.2 MG/DL (ref 8.7–10.5)
CHLORIDE SERPL-SCNC: 105 MMOL/L (ref 95–110)
CO2 SERPL-SCNC: 29 MMOL/L (ref 23–29)
CREAT SERPL-MCNC: 1 MG/DL (ref 0.5–1.4)
CV STRESS BASE HR: 65 BPM
DIASTOLIC BLOOD PRESSURE: 62 MMHG
DIFFERENTIAL METHOD BLD: ABNORMAL
EOSINOPHIL # BLD AUTO: 0.2 K/UL (ref 0–0.5)
EOSINOPHIL NFR BLD: 3.7 % (ref 0–8)
ERYTHROCYTE [DISTWIDTH] IN BLOOD BY AUTOMATED COUNT: 17.8 % (ref 11.5–14.5)
EST. GFR  (NO RACE VARIABLE): >60 ML/MIN/1.73 M^2
GLUCOSE SERPL-MCNC: 96 MG/DL (ref 70–110)
HCT VFR BLD AUTO: 30.7 % (ref 37–48.5)
HGB BLD-MCNC: 9.1 G/DL (ref 12–16)
IMM GRANULOCYTES # BLD AUTO: 0.02 K/UL (ref 0–0.04)
IMM GRANULOCYTES NFR BLD AUTO: 0.4 % (ref 0–0.5)
LYMPHOCYTES # BLD AUTO: 2 K/UL (ref 1–4.8)
LYMPHOCYTES NFR BLD: 36.9 % (ref 18–48)
MAGNESIUM SERPL-MCNC: 2 MG/DL (ref 1.6–2.6)
MCH RBC QN AUTO: 19.4 PG (ref 27–31)
MCHC RBC AUTO-ENTMCNC: 29.6 G/DL (ref 32–36)
MCV RBC AUTO: 66 FL (ref 82–98)
MONOCYTES # BLD AUTO: 0.4 K/UL (ref 0.3–1)
MONOCYTES NFR BLD: 7.3 % (ref 4–15)
NEUTROPHILS # BLD AUTO: 2.8 K/UL (ref 1.8–7.7)
NEUTROPHILS NFR BLD: 51.2 % (ref 38–73)
NRBC BLD-RTO: 0 /100 WBC
NUC STRESS EJECTION FRACTION: 70 %
OHS CV CPX 85 PERCENT MAX PREDICTED HEART RATE MALE: 131
OHS CV CPX MAX PREDICTED HEART RATE: 154
OHS CV CPX PATIENT IS FEMALE: 1
OHS CV CPX PATIENT IS MALE: 0
OHS CV CPX PEAK DIASTOLIC BLOOD PRESSURE: 62 MMHG
OHS CV CPX PEAK HEAR RATE: 102 BPM
OHS CV CPX PEAK RATE PRESSURE PRODUCT: NORMAL
OHS CV CPX PEAK SYSTOLIC BLOOD PRESSURE: 149 MMHG
OHS CV CPX PERCENT MAX PREDICTED HEART RATE ACHIEVED: 69
OHS CV CPX RATE PRESSURE PRODUCT PRESENTING: 7930
OHS CV INITIAL DOSE: 10.5 MCG/KG/MIN
OHS CV PEAK DOSE: 31.2 MCG/KG/MIN
OHS QRS DURATION: 78 MS
OHS QTC CALCULATION: 425 MS
PHOSPHATE SERPL-MCNC: 4.1 MG/DL (ref 2.7–4.5)
PLATELET # BLD AUTO: 208 K/UL (ref 150–450)
PMV BLD AUTO: ABNORMAL FL (ref 9.2–12.9)
POTASSIUM SERPL-SCNC: 4.2 MMOL/L (ref 3.5–5.1)
PROT SERPL-MCNC: 6.4 G/DL (ref 6–8.4)
RBC # BLD AUTO: 4.69 M/UL (ref 4–5.4)
SODIUM SERPL-SCNC: 141 MMOL/L (ref 136–145)
SYSTOLIC BLOOD PRESSURE: 122 MMHG
WBC # BLD AUTO: 5.47 K/UL (ref 3.9–12.7)

## 2024-12-04 PROCEDURE — 84100 ASSAY OF PHOSPHORUS: CPT | Mod: HCNC

## 2024-12-04 PROCEDURE — 83735 ASSAY OF MAGNESIUM: CPT | Mod: HCNC

## 2024-12-04 PROCEDURE — 85025 COMPLETE CBC W/AUTO DIFF WBC: CPT | Mod: HCNC

## 2024-12-04 PROCEDURE — G0378 HOSPITAL OBSERVATION PER HR: HCPCS | Mod: HCNC

## 2024-12-04 PROCEDURE — 63600175 PHARM REV CODE 636 W HCPCS: Mod: HCNC | Performed by: INTERNAL MEDICINE

## 2024-12-04 PROCEDURE — 25000003 PHARM REV CODE 250: Mod: HCNC

## 2024-12-04 PROCEDURE — A9502 TC99M TETROFOSMIN: HCPCS | Mod: HCNC

## 2024-12-04 PROCEDURE — 80053 COMPREHEN METABOLIC PANEL: CPT | Mod: HCNC

## 2024-12-04 PROCEDURE — 36415 COLL VENOUS BLD VENIPUNCTURE: CPT | Mod: HCNC

## 2024-12-04 RX ORDER — REGADENOSON 0.08 MG/ML
0.4 INJECTION, SOLUTION INTRAVENOUS ONCE
Status: COMPLETED | OUTPATIENT
Start: 2024-12-04 | End: 2024-12-04

## 2024-12-04 RX ORDER — FERROUS SULFATE 325(65) MG
325 TABLET ORAL DAILY
Qty: 90 TABLET | Refills: 3 | Status: SHIPPED | OUTPATIENT
Start: 2024-12-04

## 2024-12-04 RX ADMIN — ASPIRIN 81 MG CHEWABLE TABLET 81 MG: 81 TABLET CHEWABLE at 10:12

## 2024-12-04 RX ADMIN — REGADENOSON 0.4 MG: 0.08 INJECTION, SOLUTION INTRAVENOUS at 08:12

## 2024-12-04 RX ADMIN — PANTOPRAZOLE SODIUM 40 MG: 40 TABLET, DELAYED RELEASE ORAL at 10:12

## 2024-12-04 RX ADMIN — TETROFOSMIN 31.2 MILLICURIE: 1.38 INJECTION, POWDER, LYOPHILIZED, FOR SOLUTION INTRAVENOUS at 08:12

## 2024-12-04 RX ADMIN — TETROFOSMIN 10.5 MILLICURIE: 1.38 INJECTION, POWDER, LYOPHILIZED, FOR SOLUTION INTRAVENOUS at 07:12

## 2024-12-04 RX ADMIN — FINASTERIDE 5 MG: 5 TABLET, FILM COATED ORAL at 10:12

## 2024-12-04 RX ADMIN — HYDROCHLOROTHIAZIDE 25 MG: 25 TABLET ORAL at 10:12

## 2024-12-04 RX ADMIN — ACETAMINOPHEN 325 MG: 325 TABLET ORAL at 12:12

## 2024-12-04 RX ADMIN — METOPROLOL SUCCINATE 25 MG: 25 TABLET, EXTENDED RELEASE ORAL at 10:12

## 2024-12-04 NOTE — NURSING
Report received, care assumed. Pt AAOx4. Q shift skin assessment done, no new skin breakdown. Full ROM. Pt independent. Board updated. No c/o pain or discomfort at this time. Family present at bedside.

## 2024-12-04 NOTE — PLAN OF CARE
Problem: Adult Inpatient Plan of Care  Goal: Plan of Care Review  Outcome: Adequate for Care Transition  Goal: Patient-Specific Goal (Individualized)  Outcome: Adequate for Care Transition  Goal: Absence of Hospital-Acquired Illness or Injury  Outcome: Adequate for Care Transition  Goal: Optimal Comfort and Wellbeing  Outcome: Adequate for Care Transition  Goal: Readiness for Transition of Care  Outcome: Adequate for Care Transition     Problem: Pain Acute  Goal: Optimal Pain Control and Function  Outcome: Adequate for Care Transition

## 2024-12-04 NOTE — NURSING
Patient headed off unit via wheelchair headed to [facility]. Patient accompanied by transport, AAOx4,NAD.

## 2024-12-04 NOTE — PLAN OF CARE
Problem: Adult Inpatient Plan of Care  Goal: Plan of Care Review  12/4/2024 0044 by Marina Thomas RN  Outcome: Progressing  12/4/2024 0043 by Marina Thomas RN  Outcome: Progressing  Goal: Patient-Specific Goal (Individualized)  12/4/2024 0044 by Marina Thomas, RN  Outcome: Progressing  12/4/2024 0043 by Marina Thomas RN  Outcome: Progressing     Problem: Pain Acute  Goal: Optimal Pain Control and Function  Outcome: Progressing

## 2024-12-04 NOTE — PLAN OF CARE
12/04/24 1428   Final Note   Assessment Type Final Discharge Note   Anticipated Discharge Disposition Home   Hospital Resources/Appts/Education Provided Appointments scheduled and added to AVS   Post-Acute Status   Post-Acute Authorization Other   Other Status No Post-Acute Service Needs     Pts nurse Ninfa notified that the pt can d/c from CM standpoint

## 2024-12-04 NOTE — NURSING
Report received from benito Gray RN. Patient resting quietly, no apparent distress noted at this time. Wheels locked in lowest position, call light within reach, Telemetry monitoring in place.    [facility] [place]  Nurses Note -- 4 Eyes      12/4/2024       Skin assessed on: Q Shift      [x] No Pressure Injuries Present    []Prevention Measures Documented    [] Yes LDA  for Pressure Injury Previously documented     [] Yes New Pressure Injury Discovered   [] LDA for New Pressure Injury Added      Attending RN:  Ninfa Sears LPN     Second RN:  KAVYA Gray

## 2024-12-04 NOTE — DISCHARGE SUMMARY
[facility] Medicine  Discharge Summary      Patient Name: Linh Clifton  MRN: [id number]  BRIAN: [id number]  Patient Class: OP- Observation  Admission Date: 12/3/2024  Hospital Length of Stay: 0 days  Discharge Date and Time:  12/04/2024 2:06 PM  Attending Physician: Amarjit Kumar MD   Discharging Provider: Zayda Beverly PA-C  Primary Care Provider: Feliciano Chandler MD    Primary Care Team:  [facility]    HPI:   64 y.o. F with CAD, HTN, HLD, GERD, and anemia presented to the ED complaining of 5 days of intermittent, substernal chest pressure. She endorses associated nausea, lightheadedness and shortness of breath. Patient took Phazyme 500mg as she believed symptoms were related to GERD. Reports mild improvement temporarily, however, symptoms soon returned and increased in severity. Does not endorse increased pressure or pain with exertion. Patient states she has experienced similar pain years ago. Underwent a cardiac angiogram with Dr. Tirado which showed a small blockage which did not require stent placement. Has not experienced the pain since until now. She denies fever, chills, URI symptoms, lower extremity edema, or orthopnea.     In ED: Hypertensive, however, vitals stable. CBC showed mild anemia without leukocytosis. CMP unremarkable. BNP and initial troponin wnl. CXR showed chronic appearing interstitial findings, no large focal consolidation. Given Zofran, Protonix, GI cocktail, Toradol and Morphine for symptomatic relief. Admitted to Hospital Medicine for further evaluation of chest pain.     * No surgery found *      Hospital Course:   Patient admitted for further evaluation of intermittent substernal chest tightness. Cardiac enzymes without evidence of ischemia. Echocardiogram showed normal systolic function with an EF of 60-65%. Evaluated by Cardiology who recommended nuclear stress test to assess for underlying ischemia. Exam without evidence of myocardial ischemia  or infarction. A mild intensity fixed perfusion abnormality in the inferior wall of the left ventricle secondary to diaphragm attenuation noted. Significant microcytic anemia noted on lab-work (decreased hgb noting back to 2011). Patient reports she was aware of diagnosis, however, has only been taking multivitamin for iron supplementation. Given iron infusion while inpatient without complication. Patient reported intermittent chest pain throughout admission, however, vitals remained stable and patient did not show evidence of acute distress. Patient is hemodynamically stable for discharge. PCP and Cardiology follow-up appointments arranged. Iron supplementation prescribed on discharge.     Goals of Care Treatment Preferences:  Code Status: Full Code      SDOH Screening:  The patient was screened for utility difficulties, food insecurity, transport difficulties, housing insecurity, and interpersonal safety and there were no concerns identified this admission.     Consults:   Consults (From admission, onward)          Status Ordering Provider     Inpatient consult to Cardiology  Once        Provider:  Bartolome Tirado MD    Completed HENRY BINGHAM            Iron deficiency anemia  Anemia is likely due to Iron deficiency. Most recent hemoglobin and hematocrit are listed below.  Recent Labs     12/03/24  1053   HGB 9.7*   HCT 31.8*     Plan  - Monitor serial CBC: Daily  - Transfuse PRBC if patient becomes hemodynamically unstable, symptomatic or H/H drops below 7/21.  - Patient has not received any PRBC transfusions to date  - Patient's anemia is currently stable  - Recent labwork shows Ferritin of 7 last month. Patient does not report taking iron supplement. Will administer IV iron infusion while inpatient.     CAD (coronary artery disease)  Patient with known CAD s/p  no intervention , which is controlled Will continue ASA and Statin and monitor for S/Sx of angina/ACS. Continue to monitor on telemetry.      Hyperlipidemia  - Continue home statin.      Hypertension  Patient's blood pressure range in the last 24 hours was: BP  Min: 157/74  Max: 194/85.The patient's inpatient anti-hypertensive regimen is listed below:  Current Antihypertensives  metoprolol succinate (TOPROL-XL) 24 hr tablet 25 mg, Daily, Oral  hydroCHLOROthiazide tablet 25 mg, Daily, Oral    Plan  - BP is uncontrolled, will adjust as follows: Hydralazine PRN for SBP >180      Final Active Diagnoses:    Diagnosis Date Noted POA    Iron deficiency anemia [D50.9] 12/03/2024 Yes    CAD (coronary artery disease) [I25.10] 04/16/2015 Yes    Hyperlipidemia [E78.5] 12/15/2014 Yes    Hypertension [I10] 11/13/2012 Yes     Chronic      Problems Resolved During this Admission:    Diagnosis Date Noted Date Resolved POA    PRINCIPAL PROBLEM:  Chest pain [R07.9] 11/12/2012 12/04/2024 Yes    Iron deficiency [E61.1] 10/29/2024 12/03/2024 Yes       Discharged Condition: good    Disposition: Home or Self CareHome    Follow Up: Primary Care, Cardiology     Patient Instructions:      Notify your health care provider if you experience any of the following:  temperature >100.4     Notify your health care provider if you experience any of the following:  persistent nausea and vomiting or diarrhea     Notify your health care provider if you experience any of the following:  difficulty breathing or increased cough     Notify your health care provider if you experience any of the following:  severe persistent headache     Notify your health care provider if you experience any of the following:  persistent dizziness, light-headedness, or visual disturbances     Notify your health care provider if you experience any of the following:  increased confusion or weakness     Notify your health care provider if you experience any of the following:  severe uncontrolled pain     Activity as tolerated       Significant Diagnostic Studies: N/A    Pending Diagnostic Studies:       None            Medications:  Reconciled Home Medications:      Medication List        START taking these medications      ferrous sulfate 325 mg (65 mg iron) Tab tablet  Commonly known as: FEOSOL  Take 1 tablet (325 mg total) by mouth once daily.            CONTINUE taking these medications      aspirin 81 MG Chew  Take 81 mg by mouth once daily.     atorvastatin 20 MG tablet  Commonly known as: LIPITOR  Take 1 tablet (20 mg total) by mouth every evening.     cetirizine 10 MG tablet  Commonly known as: ZYRTEC  Take 10 mg by mouth once daily. PRN     ergocalciferol 50,000 unit Cap  Commonly known as: ERGOCALCIFEROL  Take 1 capsule (50,000 Units total) by mouth every 7 days.     finasteride 5 mg tablet  Commonly known as: PROSCAR  Take 5 mg by mouth.     fluocinonide 0.05 % external solution  Commonly known as: LIDEX  SMARTSI Milliliter(s) Topical Every Night     hydroCHLOROthiazide 25 MG tablet  Commonly known as: HYDRODIURIL  Take 1 tablet by mouth once daily     ketoconazole 2 % shampoo  Commonly known as: NIZORAL  Apply topically.     metoprolol succinate 25 MG 24 hr tablet  Commonly known as: TOPROL-XL  Take 1 tablet by mouth once daily     omeprazole 20 MG capsule  Commonly known as: PRILOSEC  Take 1 capsule by mouth once daily              Indwelling Lines/Drains at time of discharge:   Lines/Drains/Airways       None                   Time spent on the discharge of patient: 37 minutes         Zayda Beverly PA-C  Department of Hospital Medicine  [facility] [place] [facility]

## 2024-12-04 NOTE — HOSPITAL COURSE
Patient admitted for further evaluation of intermittent substernal chest tightness. Cardiac enzymes without evidence of ischemia. Echocardiogram showed normal systolic function with an EF of 60-65%. Evaluated by Cardiology who recommended nuclear stress test to assess for underlying ischemia. Exam without evidence of myocardial ischemia or infarction. A mild intensity fixed perfusion abnormality in the inferior wall of the left ventricle secondary to diaphragm attenuation noted. Significant microcytic anemia noted on lab-work (decreased hgb noting back to 2011). Patient reports she was aware of diagnosis, however, has only been taking multivitamin for iron supplementation. Given iron infusion while inpatient without complication. Patient reported intermittent chest pain throughout admission, however, vitals remained stable and patient did not show evidence of acute distress. Patient is hemodynamically stable for discharge. PCP and Cardiology follow-up appointments arranged. Iron supplementation prescribed on discharge.

## 2024-12-04 NOTE — PLAN OF CARE
Problem: Adult Inpatient Plan of Care  Goal: Plan of Care Review  Flowsheets (Taken 12/3/2024 1921)  Plan of Care Reviewed With:   patient   child  Goal: Absence of Hospital-Acquired Illness or Injury  Intervention: Identify and Manage Fall Risk  Flowsheets (Taken 12/3/2024 1921)  Safety Promotion/Fall Prevention:   assistive device/personal item within reach   bed alarm set   Fall Risk reviewed with patient/family   family to remain at bedside  Intervention: Prevent Skin Injury  Flowsheets (Taken 12/3/2024 1921)  Body Position: position changed independently  Intervention: Prevent and Manage VTE (Venous Thromboembolism) Risk  Flowsheets (Taken 12/3/2024 1921)  VTE Prevention/Management: ambulation promoted  Intervention: Prevent Infection  Flowsheets (Taken 12/3/2024 1921)  Infection Prevention: environmental surveillance performed

## 2024-12-05 ENCOUNTER — PATIENT OUTREACH (OUTPATIENT)
Dept: ADMINISTRATIVE | Facility: CLINIC | Age: 64
End: 2024-12-05
Payer: MEDICARE

## 2024-12-05 NOTE — PROGRESS NOTES
C3 nurse attempted to contact Linh Clifton  for a TCC post hospital discharge follow up call. No answer. Left voicemail with callback information. The patient has a scheduled appointment with Justine Degroot on 12/12/24 @ [phone].

## 2024-12-06 ENCOUNTER — PATIENT MESSAGE (OUTPATIENT)
Dept: ADMINISTRATIVE | Facility: CLINIC | Age: 64
End: 2024-12-06
Payer: MEDICARE

## 2024-12-06 NOTE — PROGRESS NOTES
2nd Attempt made to reach patient for TCC call. Left voicemail please call [phone] leave first name, last name, and  for Willie I will return your call.      Message sent via [facility] portal for Post Discharge Attempt.

## 2024-12-06 NOTE — PROGRESS NOTES
C3 nurse spoke with Linh Clifton  for a TCC post hospital discharge follow up call. The patient has a scheduled appointment with Justine Degroot on 12/12/24 @ 6206.

## 2024-12-10 ENCOUNTER — OFFICE VISIT (OUTPATIENT)
Dept: CARDIOLOGY | Facility: CLINIC | Age: 64
End: 2024-12-10
Payer: MEDICARE

## 2024-12-10 VITALS
OXYGEN SATURATION: 98 % | DIASTOLIC BLOOD PRESSURE: 78 MMHG | HEIGHT: 67 IN | BODY MASS INDEX: 26.53 KG/M2 | WEIGHT: 169.06 LBS | SYSTOLIC BLOOD PRESSURE: 140 MMHG | HEART RATE: 96 BPM

## 2024-12-10 DIAGNOSIS — R06.09 DOE (DYSPNEA ON EXERTION): ICD-10-CM

## 2024-12-10 DIAGNOSIS — E78.5 HYPERLIPIDEMIA, UNSPECIFIED HYPERLIPIDEMIA TYPE: ICD-10-CM

## 2024-12-10 DIAGNOSIS — R00.2 PALPITATIONS: ICD-10-CM

## 2024-12-10 DIAGNOSIS — I25.10 CORONARY ARTERY DISEASE INVOLVING NATIVE CORONARY ARTERY OF NATIVE HEART WITHOUT ANGINA PECTORIS: ICD-10-CM

## 2024-12-10 DIAGNOSIS — I10 HYPERTENSION, UNSPECIFIED TYPE: Primary | Chronic | ICD-10-CM

## 2024-12-10 PROCEDURE — 3077F SYST BP >= 140 MM HG: CPT | Mod: HCNC,CPTII,S$GLB, | Performed by: INTERNAL MEDICINE

## 2024-12-10 PROCEDURE — 3288F FALL RISK ASSESSMENT DOCD: CPT | Mod: HCNC,CPTII,S$GLB, | Performed by: INTERNAL MEDICINE

## 2024-12-10 PROCEDURE — 3008F BODY MASS INDEX DOCD: CPT | Mod: HCNC,CPTII,S$GLB, | Performed by: INTERNAL MEDICINE

## 2024-12-10 PROCEDURE — 1124F ACP DISCUSS-NO DSCNMKR DOCD: CPT | Mod: HCNC,CPTII,S$GLB, | Performed by: INTERNAL MEDICINE

## 2024-12-10 PROCEDURE — 1159F MED LIST DOCD IN RCRD: CPT | Mod: HCNC,CPTII,S$GLB, | Performed by: INTERNAL MEDICINE

## 2024-12-10 PROCEDURE — 99999 PR PBB SHADOW E&M-EST. PATIENT-LVL IV: CPT | Mod: PBBFAC,HCNC,, | Performed by: INTERNAL MEDICINE

## 2024-12-10 PROCEDURE — 3078F DIAST BP <80 MM HG: CPT | Mod: HCNC,CPTII,S$GLB, | Performed by: INTERNAL MEDICINE

## 2024-12-10 PROCEDURE — 1126F AMNT PAIN NOTED NONE PRSNT: CPT | Mod: HCNC,CPTII,S$GLB, | Performed by: INTERNAL MEDICINE

## 2024-12-10 PROCEDURE — 99214 OFFICE O/P EST MOD 30 MIN: CPT | Mod: HCNC,S$GLB,, | Performed by: INTERNAL MEDICINE

## 2024-12-10 PROCEDURE — 1101F PT FALLS ASSESS-DOCD LE1/YR: CPT | Mod: HCNC,CPTII,S$GLB, | Performed by: INTERNAL MEDICINE

## 2024-12-10 PROCEDURE — 3044F HG A1C LEVEL LT 7.0%: CPT | Mod: HCNC,CPTII,S$GLB, | Performed by: INTERNAL MEDICINE

## 2024-12-10 NOTE — PROGRESS NOTES
Subjective   Patient ID:  Linh Clifton is a 64 y.o. female who presents for follow-up of Coronary Artery Disease      HPI    HTN, HLD    Admitted 12/3/24  64 y.o. F with CAD, HTN, HLD, GERD, and anemia presented to the ED complaining of 5 days of intermittent, substernal chest pressure. She endorses associated nausea, lightheadedness and shortness of breath. Patient took Phazyme 500mg as she believed symptoms were related to GERD. Reports mild improvement temporarily, however, symptoms soon returned and increased in severity. Does not endorse increased pressure or pain with exertion. Patient states she has experienced similar pain years ago. Underwent a cardiac angiogram with Dr. Tirado which showed a small blockage which did not require stent placement. Has not experienced the pain since until now. She denies fever, chills, URI symptoms, lower extremity edema, or orthopnea.     Stress test 12/4/24    Normal myocardial perfusion scan. There is no evidence of myocardial ischemia or infarction.    There is a mild intensity fixed perfusion abnormality in the inferior wall of the left ventricle secondary to diaphragm attenuation.    The gated perfusion images showed an ejection fraction of 70% post stress.    The ECG portion of the study is negative for ischemia.    The patient reported no chest pain during the stress test.    There were no arrhythmias during stress.       Echo 12/3/24    Left Ventricle: The left ventricle is normal in size. There is normal systolic function with a visually estimated ejection fraction of 60 - 65%.    Right Ventricle: Normal right ventricular cavity size. Systolic function is normal.    Left Atrium: Left atrium is mildly dilated.    Tricuspid Valve: There is mild regurgitation.    Pulmonary Artery: The estimated pulmonary artery systolic pressure is 23 mmHg.    IVC/SVC: Normal venous pressure at 3 mmHg.    Holter 11/3/21  Sinus rhythm with heart rates varying between 58 and 125 BPM  with an average of 78 BPM.  There were very rare PACs totalling 3 and averaging 0.13 per hour.  The diary was not returned.           Stress echo 2/15/16  1 - Normal left ventricular systolic function (EF 60-65%). Normal resting wall motion.   2 - Trivial mitral regurgitation.   3 - Mild tricuspid regurgitation.   4 - The estimated PA systolic pressure is 29 mmHg.     No evidence of stress induced myocardial ischemia.      2/19/16 Still with intermittent palpitations  She thinks her CP may be from GERD     prn metoprolol 25 bid for palpitations  OV 3 months     10/20/21 here for recurrent palpitations and mild FLETCHER. Had 1 episode of CP but felt like gas  EKG NSR - ok  Echo and holter for FLETCHER and palpitations  Continue Rx for HTN, HLD      11/10/21 SOB and palpitations have improved. Still with intermittent gas like CP - improved after belching    12/3/24   Reports central chest tightness intermittently for several days  EKG NSR - ok  Troponin negative  BNP 21    12/10/24 Denies further CP or SOB  BP controlled    Review of Systems   Constitutional: Negative for decreased appetite.   HENT:  Negative for ear discharge.    Eyes:  Negative for blurred vision.   Respiratory:  Negative for hemoptysis.    Endocrine: Negative for polyphagia.   Hematologic/Lymphatic: Negative for adenopathy.   Skin:  Negative for color change.   Musculoskeletal:  Negative for joint swelling.   Genitourinary:  Negative for bladder incontinence.   Neurological:  Negative for brief paralysis.   Psychiatric/Behavioral:  Negative for hallucinations.    Allergic/Immunologic: Negative for hives.          Objective     Physical Exam  Constitutional:       Appearance: She is well-developed.   HENT:      Head: Normocephalic and atraumatic.   Eyes:      Conjunctiva/sclera: Conjunctivae normal.      Pupils: Pupils are equal, round, and reactive to light.   Cardiovascular:      Rate and Rhythm: Normal rate.      Pulses: Intact distal pulses.      Heart  sounds: Normal heart sounds.   Pulmonary:      Effort: Pulmonary effort is normal.      Breath sounds: Normal breath sounds.   Abdominal:      General: Bowel sounds are normal.      Palpations: Abdomen is soft.   Musculoskeletal:         General: Normal range of motion.      Cervical back: Normal range of motion and neck supple.   Skin:     General: Skin is warm and dry.   Neurological:      Mental Status: She is alert and oriented to person, place, and time.            Assessment and Plan     1. Hypertension, unspecified type    2. Coronary artery disease involving native coronary artery of native heart without angina pectoris    3. Hyperlipidemia, unspecified hyperlipidemia type    4. Palpitations    5. FLETCHER (dyspnea on exertion)        Plan:    CP resolved - stress test and echo ok - continue to observe   Continue Rx for HTN, HLD  OV 6 months    Advance Care Planning     Date: 12/10/2024  Patient did not wish or was not able to name a surrogate decision maker or provide an Advance Care Plan.

## 2024-12-18 ENCOUNTER — OFFICE VISIT (OUTPATIENT)
Dept: FAMILY MEDICINE | Facility: CLINIC | Age: 64
End: 2024-12-18
Payer: MEDICARE

## 2024-12-18 VITALS
HEART RATE: 75 BPM | HEIGHT: 67 IN | BODY MASS INDEX: 26.83 KG/M2 | TEMPERATURE: 98 F | DIASTOLIC BLOOD PRESSURE: 78 MMHG | OXYGEN SATURATION: 97 % | SYSTOLIC BLOOD PRESSURE: 142 MMHG | WEIGHT: 170.94 LBS

## 2024-12-18 DIAGNOSIS — I10 PRIMARY HYPERTENSION: ICD-10-CM

## 2024-12-18 DIAGNOSIS — I25.10 CORONARY ARTERY DISEASE INVOLVING NATIVE CORONARY ARTERY OF NATIVE HEART WITHOUT ANGINA PECTORIS: ICD-10-CM

## 2024-12-18 DIAGNOSIS — F43.21 GRIEF: ICD-10-CM

## 2024-12-18 DIAGNOSIS — Z09 HOSPITAL DISCHARGE FOLLOW-UP: Primary | ICD-10-CM

## 2024-12-18 PROCEDURE — 99999 PR PBB SHADOW E&M-EST. PATIENT-LVL IV: CPT | Mod: PBBFAC,HCNC,,

## 2024-12-18 NOTE — PROGRESS NOTES
"  HPI     Chief Complaint:  Chief Complaint   Patient presents with    Headache    Dizziness    Follow-up       Linh Clifton is a 64 y.o. female with multiple medical diagnoses as listed in the medical history and problem list that presents for   Chief Complaint   Patient presents with    Headache    Dizziness    Follow-up    .     Patient is not known to me with her last appointment in this department on 10/29/2024.     HPI  Pt presents for hospital follow up.  Continues with dizziness and headaches.  Recently buried friend last week. Reports increased anxiety over the past 2 weeks.  Has lost 5 people in a row.  Dr. Tirado assured patient everything is okay with her heart. Patient is not used to being "sick."        Hospital Course:   Patient admitted for further evaluation of intermittent substernal chest tightness. Cardiac enzymes without evidence of ischemia. Echocardiogram showed normal systolic function with an EF of 60-65%. Evaluated by Cardiology who recommended nuclear stress test to assess for underlying ischemia. Exam without evidence of myocardial ischemia or infarction. A mild intensity fixed perfusion abnormality in the inferior wall of the left ventricle secondary to diaphragm attenuation noted. Significant microcytic anemia noted on lab-work (decreased hgb noting back to 2011). Patient reports she was aware of diagnosis, however, has only been taking multivitamin for iron supplementation. Given iron infusion while inpatient without complication. Patient reported intermittent chest pain throughout admission, however, vitals remained stable and patient did not show evidence of acute distress. Patient is hemodynamically stable for discharge. PCP and Cardiology follow-up appointments arranged. Iron supplementation prescribed on discharge.       Assessment & Plan     Problem List Items Addressed This Visit       Hypertension (Chronic)  BP in clinic today 150/86 recheck 142/78.  Followed by " cardiology.  The current medical regimen is effective;  continue present plan and medications.      CAD (coronary artery disease)  Stable with no acute symptoms.  Followed by cardiology.     Other Visit Diagnoses       Hospital discharge follow-up    -  Primary  Hospital follow up today for chest tightness.  Cardiac workup negative. Continues with dizziness and headaches.  Recently buried friend last week. Reports increased anxiety over the past 2 weeks.  Has lost 5 people in a row. Follow up in 2 weeks to reassess BP and anxiety.    Grief      Recently buried friend last week. Reports increased anxiety over the past 2 weeks.  Has lost 5 people in a row. Follow up in 2 weeks to reassess. Will consider medication for anxiety at follow up.              --------------------------------------------      Health Maintenance:  Health Maintenance         Date Due Completion Date    Pneumococcal Vaccines (Age 65+) (1 of 2 - PCV) Never done ---    Mammogram 05/28/2025 5/28/2024    Override on 11/6/2013: Done    DEXA Scan 10/06/2025 10/6/2023    High Dose Statin 12/19/2025 12/19/2024    Aspirin/Antiplatelet Therapy 12/19/2025 12/19/2024    TETANUS VACCINE 10/17/2027 10/17/2017    Override on 10/17/2017: Done    Colorectal Cancer Screening 11/10/2028 11/10/2023    Override on 11/12/2008: Done            Health maintenance reviewed    Follow Up:  Follow up in about 2 weeks (around 1/1/2025).    Exam     Review of Systems:  (as noted above)  Review of Systems    Physical Exam:   Physical Exam  Constitutional:       General: She is not in acute distress.     Appearance: Normal appearance. She is not ill-appearing or toxic-appearing.   Cardiovascular:      Rate and Rhythm: Normal rate and regular rhythm.   Pulmonary:      Effort: Pulmonary effort is normal.      Breath sounds: Normal breath sounds.   Neurological:      Mental Status: She is alert.       Vitals:    12/18/24 1051 12/18/24 1125   BP: (!) 150/86 (!) 142/78   BP  "Location: Right arm    Patient Position: Sitting    Pulse: 75    Temp: 98 °F (36.7 °C)    TempSrc: Oral    SpO2: 97%    Weight: 77.6 kg (170 lb 15.5 oz)    Height: 5' 7" (1.702 m)       Body mass index is 26.78 kg/m².        History     Past Medical History:  Past Medical History:   Diagnosis Date    Anemia 4/16/2015    Coronary artery disease     GERD (gastroesophageal reflux disease)     Hyperlipidemia     Hypertension     Microcytic anemia 4/16/2015    Positive PPD 4/16/2015    Vitamin D deficiency disease 4/16/2015       Past Surgical History:  Past Surgical History:   Procedure Laterality Date    BREAST BIOPSY      BREAST CYST EXCISION      CARDIAC CATHETERIZATION      prior to 2005    CATARACT EXTRACTION, BILATERAL Bilateral 02/2019    COLONOSCOPY N/A 06/15/2018    Procedure: COLONOSCOPY;  Surgeon: Mikael Wilde MD;  Location: [facility] ENDO;  Service: Endoscopy;  Laterality: N/A;  confirmed-ss    COLONOSCOPY N/A 11/10/2023    Procedure: COLONOSCOPY;  Surgeon: Felicia Daniels MD;  Location: [facility] ENDO ([address] [place] [address]);  Service: Endoscopy;  Laterality: N/A;  8/21-amb referral on chart, last colon 2018, history of colon polyps, instr to e-mail, Suprep not covered, changed to PEG prep  9/27 r/s; updated instructions mailed-st  moved pt from [facility] to [facility]---KP  11/3-lvm for precall-MS  11/8-precall complete-Kpvt    HYSTERECTOMY      PARTIAL HYSTERECTOMY      over 21 years ago       Social History:  Social History     Socioeconomic History    Marital status:    Occupational History    Occupation: PA asst     Employer: [employer]   Tobacco Use    Smoking status: Never     Passive exposure: Never    Smokeless tobacco: Never   Substance and Sexual Activity    Alcohol use: Not Currently     Comment: occasional drinking    Drug use: No    Sexual activity: Yes     Partners: Male     Social Drivers of Health     Financial Resource Strain: Low Risk  (12/19/2024)    Overall Financial Resource Strain (CARDIA)     " Difficulty of Paying Living Expenses: Not hard at all   Food Insecurity: No Food Insecurity (12/19/2024)    Hunger Vital Sign     Worried About Running Out of Food in the Last Year: Never true     Ran Out of Food in the Last Year: Never true   Transportation Needs: No Transportation Needs (12/19/2024)    PRAPARE - Transportation     Lack of Transportation (Medical): No     Lack of Transportation (Non-Medical): No   Physical Activity: Inactive (12/19/2024)    Exercise Vital Sign     Days of Exercise per Week: 0 days     Minutes of Exercise per Session: 0 min   Stress: No Stress Concern Present (12/19/2024)    [employer] - Occupational Stress Questionnaire     Feeling of Stress : Not at all   Housing Stability: Low Risk  (12/19/2024)    Housing Stability Vital Sign     Unable to Pay for Housing in the Last Year: No     Homeless in the Last Year: No       Family History:  Family History   Problem Relation Name Age of Onset    Heart disease Father      Hyperlipidemia Father      Hyperlipidemia Mother      Kidney disease Mother      Cancer Brother          pancreas    Heart disease Brother      Breast cancer Neg Hx      Colon cancer Neg Hx      Ovarian cancer Neg Hx         Allergies and Medications: (updated and reviewed)  Review of patient's allergies indicates:  No Known Allergies  Current Outpatient Medications   Medication Sig Dispense Refill    aspirin 81 MG Chew Take 81 mg by mouth once daily.      atorvastatin (LIPITOR) 20 MG tablet Take 1 tablet (20 mg total) by mouth every evening. 90 tablet 1    cetirizine (ZYRTEC) 10 MG tablet Take 10 mg by mouth once daily. PRN      ergocalciferol (ERGOCALCIFEROL) 50,000 unit Cap Take 1 capsule (50,000 Units total) by mouth every 7 days. 12 capsule 0    ferrous sulfate (FEOSOL) 325 mg (65 mg iron) Tab tablet Take 1 tablet (325 mg total) by mouth once daily. 90 tablet 3    finasteride (PROSCAR) 5 mg tablet Take 5 mg by mouth.      fluocinonide  (LIDEX) 0.05 % external solution SMARTSI Milliliter(s) Topical Every Night      hydroCHLOROthiazide (HYDRODIURIL) 25 MG tablet Take 1 tablet by mouth once daily 90 tablet 12    ketoconazole (NIZORAL) 2 % shampoo Apply topically.      metoprolol succinate (TOPROL-XL) 25 MG 24 hr tablet Take 1 tablet by mouth once daily 90 tablet 3    omeprazole (PRILOSEC) 20 MG capsule Take 1 capsule by mouth once daily 90 capsule 3     No current facility-administered medications for this visit.       Patient Care Team:  Feliciano Chandler MD as PCP - General (Internal Medicine)  Britni Patino as Community Health Worker  Bartolome Tirado MD as Consulting Physician (Cardiology)  Kate Gibbs-Kiran Cohen MD as Consulting Physician (Obstetrics and Gynecology)  Leo Tamez MD as Consulting Physician (Dermatology)         - The patient is given an After Visit Summary that lists all medications with directions, allergies, education, orders placed during this encounter and follow-up instructions.      - I have reviewed the patient's medical information including past medical, family, and social history sections including the medications and allergies.      - We discussed the patient's current medications.     This note was created by combination of typed  and MModal dictation.  Transcription errors may be present.  If there are any questions, please contact me.       Marika Jones NP

## 2024-12-19 ENCOUNTER — OFFICE VISIT (OUTPATIENT)
Dept: FAMILY MEDICINE | Facility: CLINIC | Age: 64
End: 2024-12-19
Payer: MEDICARE

## 2024-12-19 VITALS
RESPIRATION RATE: 16 BRPM | SYSTOLIC BLOOD PRESSURE: 124 MMHG | WEIGHT: 171.94 LBS | BODY MASS INDEX: 26.99 KG/M2 | OXYGEN SATURATION: 98 % | TEMPERATURE: 98 F | HEART RATE: 84 BPM | HEIGHT: 67 IN | DIASTOLIC BLOOD PRESSURE: 80 MMHG

## 2024-12-19 DIAGNOSIS — I10 PRIMARY HYPERTENSION: Chronic | ICD-10-CM

## 2024-12-19 DIAGNOSIS — I70.0 INTRA-AORTIC CALCIFICATION: ICD-10-CM

## 2024-12-19 DIAGNOSIS — Z00.00 ENCOUNTER FOR MEDICARE ANNUAL WELLNESS EXAM: Primary | ICD-10-CM

## 2024-12-19 DIAGNOSIS — I25.10 CORONARY ARTERY DISEASE INVOLVING NATIVE CORONARY ARTERY OF NATIVE HEART WITHOUT ANGINA PECTORIS: ICD-10-CM

## 2024-12-19 DIAGNOSIS — E78.2 MIXED HYPERLIPIDEMIA: ICD-10-CM

## 2024-12-19 PROCEDURE — G0439 PPPS, SUBSEQ VISIT: HCPCS | Mod: S$GLB,,, | Performed by: NURSE PRACTITIONER

## 2024-12-19 PROCEDURE — 1126F AMNT PAIN NOTED NONE PRSNT: CPT | Mod: CPTII,S$GLB,, | Performed by: NURSE PRACTITIONER

## 2024-12-19 PROCEDURE — 3288F FALL RISK ASSESSMENT DOCD: CPT | Mod: CPTII,S$GLB,, | Performed by: NURSE PRACTITIONER

## 2024-12-19 PROCEDURE — 3079F DIAST BP 80-89 MM HG: CPT | Mod: CPTII,S$GLB,, | Performed by: NURSE PRACTITIONER

## 2024-12-19 PROCEDURE — 1101F PT FALLS ASSESS-DOCD LE1/YR: CPT | Mod: CPTII,S$GLB,, | Performed by: NURSE PRACTITIONER

## 2024-12-19 PROCEDURE — 1158F ADVNC CARE PLAN TLK DOCD: CPT | Mod: CPTII,S$GLB,, | Performed by: NURSE PRACTITIONER

## 2024-12-19 PROCEDURE — 1160F RVW MEDS BY RX/DR IN RCRD: CPT | Mod: CPTII,S$GLB,, | Performed by: NURSE PRACTITIONER

## 2024-12-19 PROCEDURE — 99999 PR PBB SHADOW E&M-EST. PATIENT-LVL V: CPT | Mod: PBBFAC,,, | Performed by: NURSE PRACTITIONER

## 2024-12-19 PROCEDURE — 1159F MED LIST DOCD IN RCRD: CPT | Mod: CPTII,S$GLB,, | Performed by: NURSE PRACTITIONER

## 2024-12-19 PROCEDURE — 3074F SYST BP LT 130 MM HG: CPT | Mod: CPTII,S$GLB,, | Performed by: NURSE PRACTITIONER

## 2024-12-19 NOTE — PATIENT INSTRUCTIONS
Counseling and Referral of Other Preventative  (Italic type indicates deductible and co-insurance are waived)    Patient Name: Linh Clifton  Today's Date: 12/19/2024    Health Maintenance       Date Due Completion Date    Pneumococcal Vaccines (Age 65+) (1 of 2 - PCV) Never done ---    Mammogram 05/28/2025 5/28/2024    Override on 11/6/2013: Done    DEXA Scan 10/06/2025 10/6/2023    High Dose Statin 12/18/2025 12/18/2024    Aspirin/Antiplatelet Therapy 12/18/2025 12/18/2024    TETANUS VACCINE 10/17/2027 10/17/2017    Override on 10/17/2017: Done    Colorectal Cancer Screening 11/10/2028 11/10/2023    Override on 11/12/2008: Done        No orders of the defined types were placed in this encounter.    The following information is provided to all patients.  This information is to help you find resources for any of the problems found today that may be affecting your health:                  Living healthy guide: www.[facility].[place].gov      Understanding Diabetes: www.diabetes.org      Eating healthy: www.cdc.gov/healthyweight      CDC home safety checklist: www.cdc.gov/steadi/patient.html      Agency on Aging: www.goea.[place].gov      Alcoholics anonymous (AA): www.aa.org      Physical Activity: www.[place].nih.gov/uw3vcox      Tobacco use: www.quitwithusla.org

## 2024-12-19 NOTE — PROGRESS NOTES
"  Linh Clifton presented for a  Medicare AWV and comprehensive Health Risk Assessment today. The following components were reviewed and updated:    Medical history  Family History  Social history  Allergies and Current Medications  Health Risk Assessment  Health Maintenance  Care Team         ** See Completed Assessments for Annual Wellness Visit within the encounter summary.**         The following assessments were completed:  Living Situation  CAGE  Depression Screening  Timed Get Up and Go  Whisper Test  Cognitive Function Screening  Nutrition Screening  ADL Screening  PAQ Screening      Opioid documentation:  Patient does not have a current opioid prescription.        Vitals:    12/19/24 1036   BP: 124/80   Pulse: 84   Resp: 16   Temp: 97.8 °F (36.6 °C)   TempSrc: Oral   SpO2: 98%   Weight: 78 kg (171 lb 15.3 oz)   Height: 5' 7" (1.702 m)     Body mass index is 26.93 kg/m².  Physical Exam  Vitals and nursing note reviewed.   Constitutional:       General: She is not in acute distress.     Appearance: Normal appearance. She is well-developed and well-groomed. She is not ill-appearing.   HENT:      Head: Normocephalic and atraumatic.      Right Ear: External ear normal.      Left Ear: External ear normal.      Nose: Nose normal.      Mouth/Throat:      Lips: Pink.      Mouth: Mucous membranes are moist.   Eyes:      General: Lids are normal. Vision grossly intact. Gaze aligned appropriately.      Conjunctiva/sclera: Conjunctivae normal.   Neck:      Trachea: Phonation normal.   Pulmonary:      Effort: Pulmonary effort is normal. No accessory muscle usage or respiratory distress.   Abdominal:      General: Abdomen is flat. There is no distension.   Musculoskeletal:      Cervical back: Neck supple.   Skin:     General: Skin is warm and dry.      Findings: No rash.   Neurological:      General: No focal deficit present.      Mental Status: She is alert and oriented to person, place, and time. Mental status is at " baseline.      Motor: No abnormal muscle tone.      Gait: Gait normal.   Psychiatric:         Attention and Perception: Attention and perception normal.         Mood and Affect: Mood and affect normal.         Speech: Speech normal.         Behavior: Behavior normal. Behavior is cooperative.         Thought Content: Thought content normal.         Cognition and Memory: Cognition and memory normal.         Judgment: Judgment normal.               Diagnoses and health risks identified today and associated recommendations/orders:    1. Encounter for Medicare annual wellness exam  Health maintenance reviewed and up to date, will f/u with PCP for routine preventative care  Review for Opioid Screening: Pt does not have Rx for Opioids   Review for Substance Use Disorders: Patient does not use substance   - Ambulatory Referral/Consult to Enhanced Annual Wellness Visit (eAWV)    2. Intra-aortic calcification  Uncomplicated and asymptomatic, BP controlled, on statin and ASA  anticoagulation     3. Coronary artery disease involving native coronary artery of native heart without angina pectoris  Current treatment plan is effective, no change in therapy.  Reviewed diet, exercise and weight control.  Recommended sodium restriction.  Cardiovascular risk and specific lipid/LDL goals reviewed.  Use of aspirin to prevent MI and TIA's discussed.  Use and side effects of nitroglycerine reviewed, call 911 if chest pain unrelieved by 3 tablets.    4. Primary hypertension  Continue current treatment regimen.  Dietary sodium restriction.  Regular aerobic exercise.  Patient Education: Reviewed risks of hypertension and principles of treatment.    5. Mixed hyperlipidemia  Continue dietary measures.  Continue regular exercise.  Lipid-lowering medications:  continue statin daily .      Provided Linh with a 5-10 year written screening schedule and personal prevention plan. Recommendations were developed using the USPSTF age appropriate  recommendations. Education, counseling, and referrals were provided as needed. After Visit Summary printed and given to patient which includes a list of additional screenings\tests needed.    No follow-ups on file.    Justine Degroot NP  I offered to discuss advanced care planning, including how to pick a person who would make decisions for you if you were unable to make them for yourself, called a health care power of , and what kind of decisions you might make such as use of life sustaining treatments such as ventilators and tube feeding when faced with a life limiting illness recorded on a living will that they will need to know. (How you want to be cared for as you near the end of your natural life)     X Patient is interested in learning more about how to make advanced directives.  I provided them paperwork and offered to discuss this with them.

## 2024-12-31 ENCOUNTER — PATIENT OUTREACH (OUTPATIENT)
Dept: ADMINISTRATIVE | Facility: HOSPITAL | Age: 64
End: 2024-12-31
Payer: MEDICARE

## 2025-01-04 PROBLEM — I70.0: Status: ACTIVE | Noted: 2025-01-04

## 2025-01-04 PROBLEM — Z12.11 ENCOUNTER FOR SCREENING COLONOSCOPY: Status: RESOLVED | Noted: 2018-06-15 | Resolved: 2025-01-04

## 2025-01-15 DIAGNOSIS — E55.9 VITAMIN D DEFICIENCY DISEASE: ICD-10-CM

## 2025-01-15 RX ORDER — ERGOCALCIFEROL 1.25 MG/1
50000 CAPSULE ORAL
Qty: 12 CAPSULE | Refills: 0 | Status: SHIPPED | OUTPATIENT
Start: 2025-01-15

## 2025-01-15 NOTE — TELEPHONE ENCOUNTER
Care Due:                  Date            Visit Type   Department     Provider  --------------------------------------------------------------------------------                                MYCHART                              ANNUAL       [facility]                              CHECKUP/PHY  MED/ INTERNAL  Feliciano Castellano  Last Visit: 10-      S            MED/ PEDS      [place]  Next Visit: None Scheduled  None         None Found                                                            Last  Test          Frequency    Reason                     Performed    Due Date  --------------------------------------------------------------------------------    Vitamin D...  12 months..  ergocalciferol...........  Not Found    Overdue    [employer] Embedded Care Due Messages. Reference number: [id number].   1/15/2025 10:46:48 AM CST

## 2025-01-31 ENCOUNTER — PATIENT OUTREACH (OUTPATIENT)
Dept: ADMINISTRATIVE | Facility: OTHER | Age: 65
End: 2025-01-31
Payer: MEDICARE

## 2025-02-12 ENCOUNTER — OFFICE VISIT (OUTPATIENT)
Dept: OPTOMETRY | Facility: CLINIC | Age: 65
End: 2025-02-12
Payer: MEDICARE

## 2025-02-12 DIAGNOSIS — H52.7 REFRACTIVE ERROR: ICD-10-CM

## 2025-02-12 DIAGNOSIS — Z01.00 ROUTINE EYE EXAM: Primary | ICD-10-CM

## 2025-02-12 DIAGNOSIS — Z96.1 PSEUDOPHAKIA: ICD-10-CM

## 2025-02-12 PROCEDURE — 1101F PT FALLS ASSESS-DOCD LE1/YR: CPT | Mod: CPTII,S$GLB,, | Performed by: OPTOMETRIST

## 2025-02-12 PROCEDURE — 1159F MED LIST DOCD IN RCRD: CPT | Mod: CPTII,S$GLB,, | Performed by: OPTOMETRIST

## 2025-02-12 PROCEDURE — 99999 PR PBB SHADOW E&M-EST. PATIENT-LVL II: CPT | Mod: PBBFAC,,, | Performed by: OPTOMETRIST

## 2025-02-12 PROCEDURE — 92015 DETERMINE REFRACTIVE STATE: CPT | Mod: S$GLB,,, | Performed by: OPTOMETRIST

## 2025-02-12 PROCEDURE — 1160F RVW MEDS BY RX/DR IN RCRD: CPT | Mod: CPTII,S$GLB,, | Performed by: OPTOMETRIST

## 2025-02-12 PROCEDURE — 3288F FALL RISK ASSESSMENT DOCD: CPT | Mod: CPTII,S$GLB,, | Performed by: OPTOMETRIST

## 2025-02-12 PROCEDURE — 1126F AMNT PAIN NOTED NONE PRSNT: CPT | Mod: CPTII,S$GLB,, | Performed by: OPTOMETRIST

## 2025-02-12 PROCEDURE — 92004 COMPRE OPH EXAM NEW PT 1/>: CPT | Mod: S$GLB,,, | Performed by: OPTOMETRIST

## 2025-02-12 NOTE — PROGRESS NOTES
Subjective:       Patient ID: Linh Clifton is a 64 y.o. female      Chief Complaint   Patient presents with    Concerns About Ocular Health     History of Present Illness    Dls: 3/23/21 Dr. Terry     65 y/o female presents today for ocular health check.  Pt c/o blurry vision at near ou. Pt does not wear any glasses     No tearing  + ou itching  No burning  No pain  + ha's  No floaters  No flashes    Eye meds  None    Pohx:   S/p CE Bilateral 2019     Fohx:   Glaucoma - aunt          Assessment/Plan:     1. Routine eye exam (Primary)  Eyemed    2. Refractive error  Educated patient on refractive error and discussed lens options. Dispensed updated spectacle Rx. Educated about adaptation period to new specs.    Eyeglass Final Rx       Eyeglass Final Rx         Sphere Cylinder Axis Add    Right +0.25 +0.25 005 +2.75    Left -0.75 +1.25 180 +2.75      Expiration Date: 2/12/2026    Comments: ANTIMETROPIA                          3. Pseudophakia  Well-centered. Clear.       Follow up in about 1 year (around 2/12/2026).

## 2025-02-19 RX ORDER — HYDROCHLOROTHIAZIDE 25 MG/1
25 TABLET ORAL
Qty: 30 TABLET | Refills: 0 | Status: SHIPPED | OUTPATIENT
Start: 2025-02-19

## 2025-02-19 NOTE — TELEPHONE ENCOUNTER
No care due was identified.  Health [facility] Embedded Care Due Messages. Reference number: [id number].   2/19/2025 7:02:13 AM CST

## 2025-04-14 ENCOUNTER — RESULTS FOLLOW-UP (OUTPATIENT)
Dept: FAMILY MEDICINE | Facility: CLINIC | Age: 65
End: 2025-04-14

## 2025-04-16 DIAGNOSIS — E55.9 VITAMIN D DEFICIENCY DISEASE: ICD-10-CM

## 2025-04-16 NOTE — TELEPHONE ENCOUNTER
Care Due:                  Date            Visit Type   Department     Provider  --------------------------------------------------------------------------------                                MYCHART                              ANNUAL       [facility] MED                              CHECKUP/PHY  / INTERNAL MED Feliciano Castellano  Last Visit: 10-      S            / PEDS         [place]  Next Visit: None Scheduled  None         None Found                                                            Last  Test          Frequency    Reason                     Performed    Due Date  --------------------------------------------------------------------------------    Vitamin D...  12 months..  ergocalciferol...........  Not Found    Overdue    [employer] Embedded Care Due Messages. Reference number: [id number].   4/16/2025 12:24:40 PM CDT

## 2025-04-17 RX ORDER — ERGOCALCIFEROL 1.25 MG/1
50000 CAPSULE ORAL
Qty: 12 CAPSULE | Refills: 0 | Status: SHIPPED | OUTPATIENT
Start: 2025-04-17

## 2025-04-22 RX ORDER — HYDROCHLOROTHIAZIDE 25 MG/1
25 TABLET ORAL DAILY
Qty: 30 TABLET | Refills: 0 | Status: SHIPPED | OUTPATIENT
Start: 2025-04-22

## 2025-04-22 NOTE — TELEPHONE ENCOUNTER
No care due was identified.  [facility] Embedded Care Due Messages. Reference number: [id number].   4/22/2025 10:24:40 AM CDT

## 2025-04-23 DIAGNOSIS — E78.5 HYPERLIPIDEMIA, UNSPECIFIED HYPERLIPIDEMIA TYPE: ICD-10-CM

## 2025-04-23 RX ORDER — ATORVASTATIN CALCIUM 20 MG/1
20 TABLET, FILM COATED ORAL NIGHTLY
Qty: 90 TABLET | Refills: 0 | Status: SHIPPED | OUTPATIENT
Start: 2025-04-23

## 2025-04-23 NOTE — TELEPHONE ENCOUNTER
No care due was identified.  [facility] Embedded Care Due Messages. Reference number: [id number].   4/23/2025 7:02:23 AM CDT

## 2025-05-16 ENCOUNTER — TELEPHONE (OUTPATIENT)
Dept: FAMILY MEDICINE | Facility: CLINIC | Age: 65
End: 2025-05-16
Payer: COMMERCIAL

## 2025-05-16 NOTE — TELEPHONE ENCOUNTER
I spoke with the patient who stated that she will not be attending her appointment on 05/19/25. She will call and reschedule at a later date.

## 2025-05-18 NOTE — TELEPHONE ENCOUNTER
No care due was identified.  Health [facility] Embedded Care Due Messages. Reference number: [id number].   5/18/2025 8:02:55 AM CDT

## 2025-05-20 RX ORDER — HYDROCHLOROTHIAZIDE 25 MG/1
25 TABLET ORAL
Qty: 30 TABLET | Refills: 12 | Status: SHIPPED | OUTPATIENT
Start: 2025-05-20

## 2025-05-26 DIAGNOSIS — Z00.00 ENCOUNTER FOR MEDICARE ANNUAL WELLNESS EXAM: ICD-10-CM

## 2025-06-26 ENCOUNTER — OFFICE VISIT (OUTPATIENT)
Dept: FAMILY MEDICINE | Facility: CLINIC | Age: 65
End: 2025-06-26
Payer: MEDICARE

## 2025-06-26 VITALS
WEIGHT: 167.13 LBS | TEMPERATURE: 98 F | OXYGEN SATURATION: 98 % | HEART RATE: 80 BPM | DIASTOLIC BLOOD PRESSURE: 60 MMHG | BODY MASS INDEX: 26.23 KG/M2 | SYSTOLIC BLOOD PRESSURE: 130 MMHG | HEIGHT: 67 IN

## 2025-06-26 DIAGNOSIS — Z71.89 ADVANCED DIRECTIVES, COUNSELING/DISCUSSION: ICD-10-CM

## 2025-06-26 DIAGNOSIS — E78.2 MIXED HYPERLIPIDEMIA: ICD-10-CM

## 2025-06-26 DIAGNOSIS — I10 PRIMARY HYPERTENSION: Chronic | ICD-10-CM

## 2025-06-26 DIAGNOSIS — J30.2 SEASONAL ALLERGIES: ICD-10-CM

## 2025-06-26 DIAGNOSIS — Z00.00 ENCOUNTER FOR MEDICARE ANNUAL WELLNESS EXAM: Primary | ICD-10-CM

## 2025-06-26 DIAGNOSIS — Z12.31 ENCOUNTER FOR SCREENING MAMMOGRAM FOR BREAST CANCER: ICD-10-CM

## 2025-06-26 PROCEDURE — 99999 PR PBB SHADOW E&M-EST. PATIENT-LVL IV: CPT | Mod: PBBFAC,,, | Performed by: NURSE PRACTITIONER

## 2025-06-26 RX ORDER — CETIRIZINE HYDROCHLORIDE 10 MG/1
10 TABLET ORAL DAILY
Qty: 30 TABLET | Refills: 3 | Status: SHIPPED | OUTPATIENT
Start: 2025-06-26

## 2025-06-26 NOTE — PROGRESS NOTES
"  Linh Clifton presented for a follow-up Medicare AWV today. The following components were reviewed and updated:    Medical history  Family History  Social history  Allergies and Current Medications  Health Risk Assessment  Health Maintenance  Care Team    **See Completed Assessments for Annual Wellness visit with in the encounter summary    The following assessments were completed:  Depression Screening  Cognitive function Screening  Timed Get Up Test  Whisper Test      Opioid documentation:      Patient does not have a current opioid prescription.          Vitals:    06/26/25 1052   BP: 130/60   Patient Position: Sitting   Pulse: 80   Temp: 98 °F (36.7 °C)   TempSrc: Oral   SpO2: 98%   Weight: 75.8 kg (167 lb 1.7 oz)   Height: 5' 7" (1.702 m)     Body mass index is 26.17 kg/m².       Physical Exam  Constitutional:       Appearance: She is not ill-appearing.   HENT:      Head: Normocephalic and atraumatic.   Eyes:      General: No scleral icterus.        Right eye: No discharge.         Left eye: No discharge.   Cardiovascular:      Rate and Rhythm: Normal rate and regular rhythm.      Pulses: Normal pulses.      Heart sounds: No murmur heard.  Pulmonary:      Effort: Pulmonary effort is normal. No respiratory distress.      Breath sounds: Normal breath sounds. No wheezing.   Skin:     General: Skin is warm and dry.      Capillary Refill: Capillary refill takes 2 to 3 seconds.   Neurological:      Mental Status: She is alert and oriented to person, place, and time. Mental status is at baseline.   Psychiatric:         Mood and Affect: Mood normal.           Diagnoses and health risks identified today and associated recommendations/orders:  1. Encounter for Medicare annual wellness exam  Counseled on age appropriate medical preventative services including age appropriate cancer screenings, age appropriate eye and dental exams, over all nutritional health, need for a consistent exercise regimen, and an over all push " towards maintaining a vigorous and active lifestyle.  Counseled on age appropriate vaccines and discussed upcoming health care needs based on age/gender. Discussed good sleep hygiene and stress management.   - Referral to Enhanced Annual Wellness Visit (eAWV) M+6    2. Advanced directives, counseling/discussion  I offered to discuss advanced care planning, including how to pick a person who would make decisions for you if you were unable to make them for yourself, called a health care power of , and what kind of decisions you might make such as use of life sustaining treatments such as ventilators and tube feeding when faced with a life limiting illness recorded on a living will that they will need to know. (How you want to be cared for as you near the end of your natural life)     X Patient is interested in learning more about how to make advanced directives.  I provided them paperwork and offered to discuss this with them.     3. Primary hypertension  BP Readings from Last 3 Encounters:   06/26/25 130/60   12/19/24 124/80   12/18/24 (!) 142/78     -continue current medication regimen  -DASH diet, regular cardiovascular exercises, portion control  -weight loss  -f/u with BP logs in 2 weeks    - Hypertension Digital Medicine (HDMP) Enrollment Order    4. Encounter for screening mammogram for breast cancer  - Mammo Digital Screening Bilat; Future    5. Mixed hyperlipidemia  discussed ways to lower triglycerides such as cutting simple sugars out of diet (white breads, candies, cookies, cakes, etc.) and reducing/eliminating intake of highly processed trans fatty acids.   Exercise 30 minutes a day for 4-5 days a week.   Eat more fiber.     6. Seasonal allergies  The current medical regimen is effective;  continue present plan and medications.   - cetirizine (ZYRTEC) 10 MG tablet; Take 1 tablet (10 mg total) by mouth once daily. PRN  Dispense: 30 tablet; Refill: 3        Provided Linh with a 5-10 year written  screening schedule and personal prevention plan. Recommendations were developed using the USPSTF age appropriate recommendations. Education, counseling, and referrals were provided as needed.  After Visit Summary printed and given to patient which includes a list of additional screenings\tests needed.    No follow-ups on file.      Rodolfo Urena NP

## 2025-06-26 NOTE — PATIENT INSTRUCTIONS
Counseling and Referral of Other Preventative  (Italic type indicates deductible and co-insurance are waived)    Patient Name: Linh Clifton  Today's Date: 6/26/2025    Health Maintenance       Date Due Completion Date    Pneumococcal Vaccines (Age 50+) (1 of 2 - PCV) Never done ---    Mammogram 05/28/2025 5/28/2024    Override on 11/6/2013: Done    DEXA Scan 10/06/2025 10/6/2023    Aspirin/Antiplatelet Therapy 02/12/2026 2/12/2025    High Dose Statin 04/23/2026 4/23/2025    TETANUS VACCINE 10/17/2027 10/17/2017    Override on 10/17/2017: Done    Colorectal Cancer Screening 11/10/2028 11/10/2023    Override on 11/12/2008: Done        Orders Placed This Encounter   Procedures    Mammo Digital Screening Bilat    Hypertension Digital Medicine (HDMP) Enrollment Order     The following information is provided to all patients.  This information is to help you find resources for any of the problems found today that may be affecting your health:                  Living healthy guide: www.[facility].[place].gov      Understanding Diabetes: www.diabetes.org      Eating healthy: www.cdc.gov/healthyweight      CDC home safety checklist: www.cdc.gov/steadi/patient.html      Agency on Aging: www.goea.[place].gov      Alcoholics anonymous (AA): www.aa.org      Physical Activity: www.[place].nih.gov/ui4iyqe      Tobacco use: www.quitwithusla.org

## 2025-07-08 ENCOUNTER — HOSPITAL ENCOUNTER (OUTPATIENT)
Dept: RADIOLOGY | Facility: HOSPITAL | Age: 65
Discharge: HOME | End: 2025-07-08
Attending: NURSE PRACTITIONER
Payer: MEDICARE

## 2025-07-08 DIAGNOSIS — Z12.31 ENCOUNTER FOR SCREENING MAMMOGRAM FOR BREAST CANCER: ICD-10-CM

## 2025-07-08 PROCEDURE — 77063 BREAST TOMOSYNTHESIS BI: CPT | Mod: 26,,, | Performed by: RADIOLOGY

## 2025-07-08 PROCEDURE — 77067 SCR MAMMO BI INCL CAD: CPT | Mod: 26,,, | Performed by: RADIOLOGY

## 2025-07-08 PROCEDURE — 77063 BREAST TOMOSYNTHESIS BI: CPT | Mod: TC

## 2025-07-10 DIAGNOSIS — E55.9 VITAMIN D DEFICIENCY DISEASE: ICD-10-CM

## 2025-07-10 RX ORDER — ERGOCALCIFEROL 1.25 MG/1
50000 CAPSULE ORAL
Qty: 12 CAPSULE | Refills: 0 | Status: SHIPPED | OUTPATIENT
Start: 2025-07-10

## 2025-07-10 NOTE — TELEPHONE ENCOUNTER
Care Due:                  Date            Visit Type   Department     Provider  --------------------------------------------------------------------------------                                MYCHART                              ANNUAL       [facility] FAMILY MED                              CHECKUP/PHY  / INTERNAL MED Feliciano Castellano  Last Visit: 10-      S            / PEDS         [place]  Next Visit: None Scheduled  None         None Found                                                            Last  Test          Frequency    Reason                     Performed    Due Date  --------------------------------------------------------------------------------    Vitamin D...  12 months..  ergocalciferol...........  Not Found    Overdue    [employer] Embedded Care Due Messages. Reference number: [id number].   7/10/2025 1:13:23 PM CDT

## 2025-07-28 DIAGNOSIS — E78.5 HYPERLIPIDEMIA, UNSPECIFIED HYPERLIPIDEMIA TYPE: ICD-10-CM

## 2025-07-28 RX ORDER — ATORVASTATIN CALCIUM 20 MG/1
20 TABLET, FILM COATED ORAL NIGHTLY
Qty: 90 TABLET | Refills: 0 | Status: SHIPPED | OUTPATIENT
Start: 2025-07-28

## 2025-07-28 NOTE — TELEPHONE ENCOUNTER
Care Due:                  Date            Visit Type   Department     Provider  --------------------------------------------------------------------------------                                MYCHART                              ANNUAL       [facility] FAMILY MED                              CHECKUP/PHY  / INTERNAL MED Feliciano Castellano  Last Visit: 10-      S            / PEDS         [place]  Next Visit: None Scheduled  None         None Found                                                            Last  Test          Frequency    Reason                     Performed    Due Date  --------------------------------------------------------------------------------    Office Visit  12 months..  ergocalciferol...........  10-   10-    Lipid Panel.  12 months..  atorvastatin.............  10-   10-    [employer] Embedded Care Due Messages. Reference number: [id number].   7/28/2025 7:02:04 AM CDT

## 2025-08-27 ENCOUNTER — OFFICE VISIT (OUTPATIENT)
Dept: FAMILY MEDICINE | Facility: CLINIC | Age: 65
End: 2025-08-27
Payer: MEDICARE

## 2025-08-27 VITALS
BODY MASS INDEX: 28.86 KG/M2 | SYSTOLIC BLOOD PRESSURE: 132 MMHG | OXYGEN SATURATION: 98 % | HEIGHT: 64 IN | TEMPERATURE: 98 F | DIASTOLIC BLOOD PRESSURE: 60 MMHG | WEIGHT: 169.06 LBS | HEART RATE: 80 BPM

## 2025-08-27 DIAGNOSIS — D56.3 BETA THALASSEMIA TRAIT: ICD-10-CM

## 2025-08-27 DIAGNOSIS — I10 PRIMARY HYPERTENSION: ICD-10-CM

## 2025-08-27 DIAGNOSIS — I25.10 CORONARY ARTERY DISEASE INVOLVING NATIVE CORONARY ARTERY OF NATIVE HEART WITHOUT ANGINA PECTORIS: ICD-10-CM

## 2025-08-27 DIAGNOSIS — M25.562 ACUTE PAIN OF LEFT KNEE: Primary | ICD-10-CM

## 2025-08-27 DIAGNOSIS — R73.9 HYPERGLYCEMIA: ICD-10-CM

## 2025-08-27 DIAGNOSIS — E78.2 MIXED HYPERLIPIDEMIA: ICD-10-CM

## 2025-08-27 DIAGNOSIS — D64.9 ANEMIA, UNSPECIFIED TYPE: ICD-10-CM

## 2025-08-27 PROCEDURE — 99999 PR PBB SHADOW E&M-EST. PATIENT-LVL III: CPT | Mod: PBBFAC,HCNC,, | Performed by: INTERNAL MEDICINE

## 2025-08-27 PROCEDURE — 1101F PT FALLS ASSESS-DOCD LE1/YR: CPT | Mod: CPTII,HCNC,S$GLB, | Performed by: INTERNAL MEDICINE

## 2025-08-27 PROCEDURE — 1125F AMNT PAIN NOTED PAIN PRSNT: CPT | Mod: CPTII,HCNC,S$GLB, | Performed by: INTERNAL MEDICINE

## 2025-08-27 PROCEDURE — 3078F DIAST BP <80 MM HG: CPT | Mod: CPTII,HCNC,S$GLB, | Performed by: INTERNAL MEDICINE

## 2025-08-27 PROCEDURE — 99214 OFFICE O/P EST MOD 30 MIN: CPT | Mod: HCNC,S$GLB,, | Performed by: INTERNAL MEDICINE

## 2025-08-27 PROCEDURE — 3008F BODY MASS INDEX DOCD: CPT | Mod: CPTII,HCNC,S$GLB, | Performed by: INTERNAL MEDICINE

## 2025-08-27 PROCEDURE — G2211 COMPLEX E/M VISIT ADD ON: HCPCS | Mod: HCNC,S$GLB,, | Performed by: INTERNAL MEDICINE

## 2025-08-27 PROCEDURE — 3075F SYST BP GE 130 - 139MM HG: CPT | Mod: CPTII,HCNC,S$GLB, | Performed by: INTERNAL MEDICINE

## 2025-08-27 PROCEDURE — 3288F FALL RISK ASSESSMENT DOCD: CPT | Mod: CPTII,HCNC,S$GLB, | Performed by: INTERNAL MEDICINE

## 2025-08-27 PROCEDURE — 1159F MED LIST DOCD IN RCRD: CPT | Mod: CPTII,HCNC,S$GLB, | Performed by: INTERNAL MEDICINE

## 2025-08-27 RX ORDER — FINASTERIDE 5 MG/1
5 TABLET, FILM COATED ORAL DAILY
Qty: 90 TABLET | Refills: 1 | Status: SHIPPED | OUTPATIENT
Start: 2025-08-27